# Patient Record
Sex: FEMALE | Race: WHITE | NOT HISPANIC OR LATINO | Employment: FULL TIME | URBAN - METROPOLITAN AREA
[De-identification: names, ages, dates, MRNs, and addresses within clinical notes are randomized per-mention and may not be internally consistent; named-entity substitution may affect disease eponyms.]

---

## 2018-01-23 ENCOUNTER — OFFICE VISIT (OUTPATIENT)
Dept: URGENT CARE | Facility: CLINIC | Age: 48
End: 2018-01-23
Payer: COMMERCIAL

## 2018-01-23 VITALS
HEART RATE: 82 BPM | DIASTOLIC BLOOD PRESSURE: 81 MMHG | TEMPERATURE: 98 F | HEIGHT: 65 IN | RESPIRATION RATE: 16 BRPM | SYSTOLIC BLOOD PRESSURE: 168 MMHG | OXYGEN SATURATION: 100 % | WEIGHT: 216 LBS | BODY MASS INDEX: 35.99 KG/M2

## 2018-01-23 DIAGNOSIS — J06.9 UPPER RESPIRATORY TRACT INFECTION, UNSPECIFIED TYPE: Primary | ICD-10-CM

## 2018-01-23 DIAGNOSIS — R05.9 COUGH: ICD-10-CM

## 2018-01-23 LAB
CTP QC/QA: YES
FLUAV AG NPH QL: NEGATIVE
FLUBV AG NPH QL: NEGATIVE

## 2018-01-23 PROCEDURE — 96372 THER/PROPH/DIAG INJ SC/IM: CPT | Mod: S$GLB,,, | Performed by: EMERGENCY MEDICINE

## 2018-01-23 PROCEDURE — 99203 OFFICE O/P NEW LOW 30 MIN: CPT | Mod: 25,S$GLB,, | Performed by: EMERGENCY MEDICINE

## 2018-01-23 PROCEDURE — 87804 INFLUENZA ASSAY W/OPTIC: CPT | Mod: 59,QW,S$GLB, | Performed by: EMERGENCY MEDICINE

## 2018-01-23 RX ORDER — BETAMETHASONE SODIUM PHOSPHATE AND BETAMETHASONE ACETATE 3; 3 MG/ML; MG/ML
9 INJECTION, SUSPENSION INTRA-ARTICULAR; INTRALESIONAL; INTRAMUSCULAR; SOFT TISSUE
Status: COMPLETED | OUTPATIENT
Start: 2018-01-23 | End: 2018-01-23

## 2018-01-23 RX ORDER — AZITHROMYCIN 500 MG/1
500 TABLET, FILM COATED ORAL DAILY
Qty: 5 TABLET | Refills: 0 | Status: SHIPPED | OUTPATIENT
Start: 2018-01-23 | End: 2018-01-28

## 2018-01-23 RX ADMIN — BETAMETHASONE SODIUM PHOSPHATE AND BETAMETHASONE ACETATE 9 MG: 3; 3 INJECTION, SUSPENSION INTRA-ARTICULAR; INTRALESIONAL; INTRAMUSCULAR; SOFT TISSUE at 08:01

## 2018-01-23 NOTE — PROGRESS NOTES
"Subjective:       Patient ID: Flor Burnham is a 47 y.o. female.    Vitals:  height is 5' 5" (1.651 m) and weight is 98 kg (216 lb). Her temperature is 97.6 °F (36.4 °C). Her blood pressure is 168/81 (abnormal) and her pulse is 82. Her respiration is 16 and oxygen saturation is 100%.     Chief Complaint: Sore Throat; Generalized Body Aches; Nasal Congestion; and Cough    Sore Throat    This is a new problem. The current episode started yesterday. The problem has been gradually worsening. Associated symptoms include congestion, coughing and headaches. Pertinent negatives include no abdominal pain, ear pain, hoarse voice or shortness of breath.   Cough   Associated symptoms include headaches and a sore throat. Pertinent negatives include no chest pain, chills, ear pain, eye redness, fever, myalgias, shortness of breath or wheezing.     Review of Systems   Constitution: Negative for chills, fever and malaise/fatigue.   HENT: Positive for congestion and sore throat. Negative for ear pain and hoarse voice.    Eyes: Negative for discharge and redness.   Cardiovascular: Negative for chest pain, dyspnea on exertion and leg swelling.   Respiratory: Positive for cough. Negative for shortness of breath, sputum production and wheezing.    Musculoskeletal: Negative for myalgias.   Gastrointestinal: Negative for abdominal pain and nausea.   Neurological: Positive for headaches.       Objective:      Physical Exam   Constitutional: She is oriented to person, place, and time. She appears well-developed and well-nourished. She is cooperative.  Non-toxic appearance. She does not appear ill. No distress.   HENT:   Head: Normocephalic and atraumatic.   Right Ear: Hearing, tympanic membrane, external ear and ear canal normal.   Left Ear: Hearing, tympanic membrane, external ear and ear canal normal.   Nose: Mucosal edema present. No rhinorrhea or nasal deformity. No epistaxis. Right sinus exhibits no maxillary sinus tenderness and " no frontal sinus tenderness. Left sinus exhibits no maxillary sinus tenderness and no frontal sinus tenderness.   Mouth/Throat: Uvula is midline and mucous membranes are normal. No trismus in the jaw. Normal dentition. No uvula swelling. Posterior oropharyngeal erythema present.   Eyes: Conjunctivae and lids are normal. Right eye exhibits no discharge. Left eye exhibits no discharge. No scleral icterus.   Sclera clear bilat   Neck: Trachea normal, normal range of motion, full passive range of motion without pain and phonation normal. Neck supple.   Cardiovascular: Normal rate, regular rhythm, normal heart sounds, intact distal pulses and normal pulses.    Pulmonary/Chest: Effort normal and breath sounds normal. No respiratory distress.   Abdominal: Soft. Normal appearance. She exhibits no pulsatile midline mass.   Musculoskeletal: Normal range of motion. She exhibits no edema or deformity.   Neurological: She is alert and oriented to person, place, and time. She exhibits normal muscle tone. Coordination normal.   Skin: Skin is warm, dry and intact. She is not diaphoretic. No pallor.   Psychiatric: She has a normal mood and affect. Her speech is normal and behavior is normal. Judgment and thought content normal. Cognition and memory are normal.   Nursing note and vitals reviewed.      Assessment:       1. Upper respiratory tract infection, unspecified type    2. Cough        Plan:         Upper respiratory tract infection, unspecified type  -     azithromycin (ZITHROMAX) 500 MG tablet; Take 1 tablet (500 mg total) by mouth once daily. Take 1 tablet daily for 5 days.  Dispense: 5 tablet; Refill: 0  -     betamethasone acetate-betamethasone sodium phosphate injection 9 mg; Inject 1.5 mLs (9 mg total) into the muscle one time.    Cough  -     POCT Influenza A/B      Influenza screen is negative.

## 2018-01-23 NOTE — PATIENT INSTRUCTIONS
Understanding Sinus Problems    You dont often think about your sinuses until theres a problem. One day you realize you cant smell dinner cooking. Or you find you often have headaches or problems breathing through your nose.  Symptoms of sinus problems  Sinus problems can cause uncomfortable symptoms. Your nose may run constantly. You might have trouble sleeping at night. You may even lose your sense of smell. Other symptoms can include:  · Nasal congestion  · Fullness in ears  · Green, yellow, or bloody drainage from the nose  · Trouble tasting food  · Frequent headaches  · Facial pain  · Cough  When sinuses are blocked  If something blocks the passages in the nose or sinuses, mucus cant drain. Mucus-filled sinuses often become infected.  · Colds cause the lining of the nose and sinuses to swell and make extra mucus. A buildup of mucus can lead to a more serious infection.  · Allergies irritate turbinates and other tissues. This causes swelling, which can cause a blockage. Over time, this irritation can also lead to sacs of swollen tissue (polyps).  · Polyps may form in both the sinuses and nose. Polyps can grow large enough to clog nasal passages and block drainage.  · A crooked (deviated) septum may block nasal passages. This is often the result of an injury.  Date Last Reviewed: 11/1/2016  © 7103-4794 [employer]. [address] [place] [address], [place] [address]. All rights reserved. This information is not intended as a substitute for professional medical care. Always follow your healthcare professional's instructions.

## 2018-05-19 ENCOUNTER — OFFICE VISIT (OUTPATIENT)
Dept: URGENT CARE | Facility: CLINIC | Age: 48
End: 2018-05-19
Payer: COMMERCIAL

## 2018-05-19 VITALS
SYSTOLIC BLOOD PRESSURE: 130 MMHG | TEMPERATURE: 98 F | HEART RATE: 69 BPM | BODY MASS INDEX: 35.99 KG/M2 | WEIGHT: 216 LBS | RESPIRATION RATE: 16 BRPM | OXYGEN SATURATION: 96 % | DIASTOLIC BLOOD PRESSURE: 80 MMHG | HEIGHT: 65 IN

## 2018-05-19 DIAGNOSIS — J06.9 UPPER RESPIRATORY TRACT INFECTION, UNSPECIFIED TYPE: Primary | ICD-10-CM

## 2018-05-19 PROCEDURE — 3008F BODY MASS INDEX DOCD: CPT | Mod: CPTII,S$GLB,, | Performed by: EMERGENCY MEDICINE

## 2018-05-19 PROCEDURE — 96372 THER/PROPH/DIAG INJ SC/IM: CPT | Mod: S$GLB,,, | Performed by: EMERGENCY MEDICINE

## 2018-05-19 PROCEDURE — 99213 OFFICE O/P EST LOW 20 MIN: CPT | Mod: 25,S$GLB,, | Performed by: EMERGENCY MEDICINE

## 2018-05-19 RX ORDER — BETAMETHASONE SODIUM PHOSPHATE AND BETAMETHASONE ACETATE 3; 3 MG/ML; MG/ML
12 INJECTION, SUSPENSION INTRA-ARTICULAR; INTRALESIONAL; INTRAMUSCULAR; SOFT TISSUE
Status: COMPLETED | OUTPATIENT
Start: 2018-05-19 | End: 2018-05-19

## 2018-05-19 RX ADMIN — BETAMETHASONE SODIUM PHOSPHATE AND BETAMETHASONE ACETATE 12 MG: 3; 3 INJECTION, SUSPENSION INTRA-ARTICULAR; INTRALESIONAL; INTRAMUSCULAR; SOFT TISSUE at 09:05

## 2018-05-19 NOTE — PROGRESS NOTES
"Subjective:       Patient ID: Flor Burnham is a 48 y.o. female.    Vitals:  height is 5' 5" (1.651 m) and weight is 98 kg (216 lb). Her temperature is 98 °F (36.7 °C). Her blood pressure is 130/80 and her pulse is 69. Her respiration is 16 and oxygen saturation is 96%.     Chief Complaint: Sinus Problem and Sore Throat    Sinus Problem   The current episode started in the past 7 days. The problem has been gradually worsening since onset. Associated symptoms include congestion, coughing and a sore throat. Pertinent negatives include no chills, ear pain, headaches, hoarse voice or shortness of breath.   Sore Throat    Associated symptoms include congestion and coughing. Pertinent negatives include no abdominal pain, ear pain, headaches, hoarse voice or shortness of breath.     Review of Systems   Constitution: Negative for chills, fever and malaise/fatigue.   HENT: Positive for congestion and sore throat. Negative for ear pain and hoarse voice.    Eyes: Negative for discharge and redness.   Cardiovascular: Negative for chest pain, dyspnea on exertion and leg swelling.   Respiratory: Positive for cough. Negative for shortness of breath, sputum production and wheezing.    Musculoskeletal: Negative for myalgias.   Gastrointestinal: Negative for abdominal pain and nausea.   Neurological: Negative for headaches.       Objective:      Physical Exam   Constitutional: She is oriented to person, place, and time. She appears well-developed and well-nourished. She is cooperative.  Non-toxic appearance. She does not appear ill. No distress.   HENT:   Head: Normocephalic and atraumatic.   Right Ear: Hearing, tympanic membrane, external ear and ear canal normal.   Left Ear: Hearing, tympanic membrane, external ear and ear canal normal.   Nose: Rhinorrhea present. No mucosal edema or nasal deformity. No epistaxis. Right sinus exhibits no maxillary sinus tenderness and no frontal sinus tenderness. Left sinus exhibits no " maxillary sinus tenderness and no frontal sinus tenderness.   Mouth/Throat: Uvula is midline, oropharynx is clear and moist and mucous membranes are normal. No trismus in the jaw. Normal dentition. No uvula swelling. No posterior oropharyngeal erythema.   Eyes: Conjunctivae and lids are normal. No scleral icterus.   Sclera clear bilat   Neck: Trachea normal, full passive range of motion without pain and phonation normal.   Cardiovascular: Normal rate, regular rhythm and normal pulses.    Pulmonary/Chest: Effort normal and breath sounds normal. No respiratory distress.   Abdominal: Normal appearance.   Musculoskeletal: Normal range of motion. She exhibits no edema or deformity.   Neurological: She is alert and oriented to person, place, and time. She exhibits normal muscle tone. Coordination normal.   Skin: Skin is warm, dry and intact. She is not diaphoretic. No pallor.   Psychiatric: She has a normal mood and affect. Her speech is normal and behavior is normal. Judgment and thought content normal. Cognition and memory are normal.   Nursing note and vitals reviewed.      Assessment:       1. Upper respiratory tract infection, unspecified type        Plan:         Upper respiratory tract infection, unspecified type  -     betamethasone acetate-betamethasone sodium phosphate injection 12 mg; Inject 2 mLs (12 mg total) into the muscle one time.

## 2018-05-19 NOTE — PATIENT INSTRUCTIONS
Viral Upper Respiratory Illness (Adult)  You have a viral upper respiratory illness (URI), which is another term for the common cold. This illness is contagious during the first few days. It is spread through the air by coughing and sneezing. It may also be spread by direct contact (touching the sick person and then touching your own eyes, nose, or mouth). Frequent handwashing will decrease risk of spread. Most viral illnesses go away within 7 to 10 days with rest and simple home remedies. Sometimes the illness may last for several weeks. Antibiotics will not kill a virus, and they are generally not prescribed for this condition.    Home care  · If symptoms are severe, rest at home for the first 2 to 3 days. When you resume activity, don't let yourself get too tired.  · Avoid being exposed to cigarette smoke (yours or others).  · You may use acetaminophen or ibuprofen to control pain and fever, unless another medicine was prescribed. (Note: If you have chronic liver or kidney disease, have ever had a stomach ulcer or gastrointestinal bleeding, or are taking blood-thinning medicines, talk with your healthcare provider before using these medicines.) Aspirin should never be given to anyone under 18 years of age who is ill with a viral infection or fever. It may cause severe liver or brain damage.  · Your appetite may be poor, so a light diet is fine. Avoid dehydration by drinking 6 to 8 glasses of fluids per day (water, soft drinks, juices, tea, or soup). Extra fluids will help loosen secretions in the nose and lungs.  · Over-the-counter cold medicines will not shorten the length of time youre sick, but they may be helpful for the following symptoms: cough, sore throat, and nasal and sinus congestion. (Note: Do not use decongestants if you have high blood pressure.)  Follow-up care  Follow up with your healthcare provider, or as advised.  When to seek medical advice  Call your healthcare provider right away if any  of these occur:  · Cough with lots of colored sputum (mucus)  · Severe headache; face, neck, or ear pain  · Difficulty swallowing due to throat pain  · Fever of 100.4°F (38°C)  Call 911, or get immediate medical care  Call emergency services right away if any of these occur:  · Chest pain, shortness of breath, wheezing, or difficulty breathing  · Coughing up blood  · Inability to swallow due to throat pain  Date Last Reviewed: 9/13/2015  © 1134-6319 [employer]. [address] [place] [id number]. All rights reserved. This information is not intended as a substitute for professional medical care. Always follow your healthcare professional's instructions.

## 2018-05-22 ENCOUNTER — TELEPHONE (OUTPATIENT)
Dept: URGENT CARE | Facility: CLINIC | Age: 48
End: 2018-05-22

## 2018-07-03 ENCOUNTER — OFFICE VISIT (OUTPATIENT)
Dept: URGENT CARE | Facility: CLINIC | Age: 48
End: 2018-07-03
Payer: COMMERCIAL

## 2018-07-03 VITALS
HEART RATE: 74 BPM | WEIGHT: 216 LBS | SYSTOLIC BLOOD PRESSURE: 151 MMHG | DIASTOLIC BLOOD PRESSURE: 84 MMHG | RESPIRATION RATE: 16 BRPM | OXYGEN SATURATION: 99 % | TEMPERATURE: 97 F | BODY MASS INDEX: 35.99 KG/M2 | HEIGHT: 65 IN

## 2018-07-03 DIAGNOSIS — J06.9 ACUTE URI: Primary | ICD-10-CM

## 2018-07-03 PROCEDURE — 3008F BODY MASS INDEX DOCD: CPT | Mod: CPTII,S$GLB,, | Performed by: FAMILY MEDICINE

## 2018-07-03 PROCEDURE — 99213 OFFICE O/P EST LOW 20 MIN: CPT | Mod: S$GLB,,, | Performed by: FAMILY MEDICINE

## 2018-07-03 RX ORDER — CETIRIZINE HYDROCHLORIDE 10 MG/1
10 TABLET, CHEWABLE ORAL DAILY
COMMUNITY

## 2018-07-03 RX ORDER — FLUTICASONE PROPIONATE 50 MCG
1 SPRAY, SUSPENSION (ML) NASAL DAILY
COMMUNITY
End: 2020-12-22

## 2018-07-03 RX ORDER — AZITHROMYCIN 250 MG/1
250 TABLET, FILM COATED ORAL DAILY
Qty: 6 TABLET | Refills: 0 | Status: SHIPPED | OUTPATIENT
Start: 2018-07-03 | End: 2018-07-09

## 2018-07-03 RX ORDER — CODEINE PHOSPHATE AND GUAIFENESIN 10; 100 MG/5ML; MG/5ML
5 SOLUTION ORAL NIGHTLY PRN
Qty: 50 ML | Refills: 0 | Status: SHIPPED | OUTPATIENT
Start: 2018-07-03 | End: 2018-07-13

## 2018-07-03 NOTE — PATIENT INSTRUCTIONS
Please return here or go to the Emergency Department for any concerns or worsening of condition.  If you were prescribed antibiotics, please take them to completion.  If you were prescribed a narcotic medication, do not drive or operate heavy equipment or machinery while taking these medications.  Please follow up with your primary care doctor or specialist as needed.  If you  smoke, please stop smoking.    Symptomatic treatment:    Tylenol every 4 hours  Ibuprofen ever 6 hours  salt water gargles  Cepachol helps to numb the discomfort  Chloroseptic spray  Nasal saline spray reduces inflammation and dryness  Warm face compresses as often as you can  Vicks vapor rub at night  Flonase OTC or Nasacort OTC  Simple foods like chicken noodle soup help  Delsym helps with coughing at night  Zyrtec/Claritin during the day   Rest as much as you can    Mucinex DM

## 2018-07-03 NOTE — PROGRESS NOTES
"Subjective:       Patient ID: Flor Burnham is a 48 y.o. female.    Vitals:  height is 5' 5" (1.651 m) and weight is 98 kg (216 lb). Her temperature is 97.1 °F (36.2 °C). Her blood pressure is 151/84 (abnormal) and her pulse is 74. Her respiration is 16 and oxygen saturation is 99%.     Chief Complaint: Sinus Problem    Sinus Problem   This is a new problem. The current episode started more than 1 month ago. The problem has been gradually worsening since onset. Associated symptoms include congestion and coughing. Pertinent negatives include no chills, ear pain, headaches, hoarse voice, shortness of breath or sore throat.     Review of Systems   Constitution: Negative for chills, fever and malaise/fatigue.   HENT: Positive for congestion. Negative for ear pain, hoarse voice and sore throat.    Eyes: Negative for discharge and redness.   Cardiovascular: Negative for chest pain, dyspnea on exertion and leg swelling.   Respiratory: Positive for cough and sputum production. Negative for shortness of breath and wheezing.    Musculoskeletal: Negative for myalgias.   Gastrointestinal: Negative for abdominal pain and nausea.   Neurological: Negative for headaches.       Objective:      Physical Exam   Constitutional: She appears well-developed and well-nourished.   HENT:   Head: Normocephalic and atraumatic.   Right Ear: External ear normal.   Left Ear: External ear normal.   Nose: Nose normal.   Mouth/Throat: Oropharynx is clear and moist. No oropharyngeal exudate.   Eyes: Conjunctivae are normal. Right eye exhibits no discharge. Left eye exhibits no discharge.   Cardiovascular: Normal rate, regular rhythm and normal heart sounds.    Pulmonary/Chest: Effort normal and breath sounds normal. She has no wheezes.   Skin: Skin is warm and dry.   Psychiatric: She has a normal mood and affect. Her behavior is normal.   Vitals reviewed.      Assessment:       1. Acute URI        Plan:         Acute URI    Other orders  -     " azithromycin (ZITHROMAX Z-WANDA) 250 MG tablet; Take 1 tablet (250 mg total) by mouth once daily. TWO TABS DAY ONE, ONE TAB DAYS TWO-FIVE for 6 days  Dispense: 6 tablet; Refill: 0

## 2018-07-06 ENCOUNTER — TELEPHONE (OUTPATIENT)
Dept: URGENT CARE | Facility: CLINIC | Age: 48
End: 2018-07-06

## 2019-02-18 ENCOUNTER — OFFICE VISIT (OUTPATIENT)
Dept: URGENT CARE | Facility: CLINIC | Age: 49
End: 2019-02-18
Payer: COMMERCIAL

## 2019-02-18 VITALS
SYSTOLIC BLOOD PRESSURE: 142 MMHG | DIASTOLIC BLOOD PRESSURE: 83 MMHG | WEIGHT: 216 LBS | BODY MASS INDEX: 35.99 KG/M2 | OXYGEN SATURATION: 99 % | HEIGHT: 65 IN | TEMPERATURE: 98 F | HEART RATE: 68 BPM | RESPIRATION RATE: 18 BRPM

## 2019-02-18 DIAGNOSIS — M79.672 PAIN IN LEFT FOOT: Primary | ICD-10-CM

## 2019-02-18 DIAGNOSIS — M25.561 ACUTE PAIN OF RIGHT KNEE: ICD-10-CM

## 2019-02-18 PROCEDURE — 73630 XR FOOT COMPLETE 3 VIEW LEFT: ICD-10-PCS | Mod: LT,S$GLB,, | Performed by: RADIOLOGY

## 2019-02-18 PROCEDURE — 3008F PR BODY MASS INDEX (BMI) DOCUMENTED: ICD-10-PCS | Mod: CPTII,S$GLB,, | Performed by: PHYSICIAN ASSISTANT

## 2019-02-18 PROCEDURE — 3008F BODY MASS INDEX DOCD: CPT | Mod: CPTII,S$GLB,, | Performed by: PHYSICIAN ASSISTANT

## 2019-02-18 PROCEDURE — 99214 PR OFFICE/OUTPT VISIT, EST, LEVL IV, 30-39 MIN: ICD-10-PCS | Mod: S$GLB,,, | Performed by: PHYSICIAN ASSISTANT

## 2019-02-18 PROCEDURE — 99214 OFFICE O/P EST MOD 30 MIN: CPT | Mod: S$GLB,,, | Performed by: PHYSICIAN ASSISTANT

## 2019-02-18 PROCEDURE — 73630 X-RAY EXAM OF FOOT: CPT | Mod: LT,S$GLB,, | Performed by: RADIOLOGY

## 2019-02-18 NOTE — PATIENT INSTRUCTIONS
If not allergic,take tylenol (acetominophen) for fever control, chills, or body aches every 4 hours. Do not exceed 4000 mg/ day.If not allergic, take Motrin (Ibuprofen) every 4 hours for fever, chills, pain or inflammation. Do not exceed 2400 mg/day. You can alternate taking tylenol and motrin.  If you were prescribed a narcotic medication, do not drive or operate heavy equipment or machinery while taking these medications.  You must understand that you've received an Urgent Care treatment only and that you may be released before all your medical problems are known or treated. You, the patient, will arrange for follow up care as instructed.  Follow up with your PCP or specialty clinic as directed in the next 1-2 weeks if not improved or as needed.  You can call [phone] to schedule an appointment with the appropriate provider.  If your condition worsens we recommend that you receive another evaluation at the emergency room immediately or contact your primary medical clinics after hours call service to discuss your concerns.  Please return here or go to the Emergency Department for any concerns or worsening of condition.

## 2019-02-18 NOTE — PROGRESS NOTES
"Subjective:       Patient ID: Flor Burnham is a 48 y.o. female.    Vitals:  height is 5' 5" (1.651 m) and weight is 98 kg (216 lb). Her oral temperature is 97.7 °F (36.5 °C). Her blood pressure is 142/83 (abnormal) and her pulse is 68. Her respiration is 18 and oxygen saturation is 99%.     Chief Complaint: Foot Injury    New problem left foot pain due to a fall yesterday, states slip and fell on right knee and left foot. Would like right knee to be looked at too      Foot Injury    The incident occurred 12 to 24 hours ago. The incident occurred at home. The injury mechanism was a fall. The pain is present in the left foot and right knee. The quality of the pain is described as aching and shooting. The pain is moderate. The pain has been constant since onset. Associated symptoms include an inability to bear weight, numbness and tingling. She reports no foreign bodies present. The symptoms are aggravated by movement, palpation and weight bearing. She has tried ice and elevation for the symptoms. The treatment provided no relief.       Constitution: Negative for fatigue.   HENT: Negative for facial swelling and facial trauma.    Neck: Negative for neck stiffness.   Cardiovascular: Negative for chest trauma.   Eyes: Negative for eye trauma, double vision and blurred vision.   Gastrointestinal: Negative for abdominal trauma, abdominal pain and rectal bleeding.   Genitourinary: Negative for hematuria, missed menses, genital trauma and pelvic pain.   Musculoskeletal: Negative for pain, trauma, joint swelling and abnormal ROM of joint.   Skin: Negative for color change, wound, abrasion, laceration and bruising.   Neurological: Positive for numbness. Negative for dizziness, history of vertigo, light-headedness, coordination disturbances, altered mental status and loss of consciousness.   Hematologic/Lymphatic: Negative for history of bleeding disorder.   Psychiatric/Behavioral: Negative for altered mental status.     "   Objective:      Physical Exam   Constitutional: She is oriented to person, place, and time. She appears well-developed and well-nourished. She is cooperative.  Non-toxic appearance. She does not appear ill. No distress.   HENT:   Head: Normocephalic and atraumatic. Head is without abrasion, without contusion and without laceration.   Right Ear: Hearing, tympanic membrane, external ear and ear canal normal. No hemotympanum.   Left Ear: Hearing, tympanic membrane, external ear and ear canal normal. No hemotympanum.   Nose: Nose normal. No mucosal edema, rhinorrhea or nasal deformity. No epistaxis. Right sinus exhibits no maxillary sinus tenderness and no frontal sinus tenderness. Left sinus exhibits no maxillary sinus tenderness and no frontal sinus tenderness.   Mouth/Throat: Uvula is midline, oropharynx is clear and moist and mucous membranes are normal. No trismus in the jaw. Normal dentition. No uvula swelling. No posterior oropharyngeal erythema.   Eyes: Conjunctivae, EOM and lids are normal. Pupils are equal, round, and reactive to light. Right eye exhibits no discharge. Left eye exhibits no discharge. No scleral icterus.   Sclera clear bilat   Neck: Trachea normal, normal range of motion, full passive range of motion without pain and phonation normal. Neck supple. No spinous process tenderness and no muscular tenderness present. No neck rigidity. No tracheal deviation present.   Cardiovascular: Normal rate, regular rhythm, normal heart sounds, intact distal pulses and normal pulses.   Pulmonary/Chest: Effort normal and breath sounds normal. No respiratory distress.   Abdominal: Soft. Normal appearance and bowel sounds are normal. She exhibits no distension, no pulsatile midline mass and no mass. There is no tenderness.   Musculoskeletal: Normal range of motion. She exhibits no edema or deformity.        Right knee: She exhibits swelling. She exhibits normal range of motion, no effusion, no ecchymosis, no  "deformity, no laceration, no erythema, normal alignment, no LCL laxity, normal patellar mobility, no bony tenderness, normal meniscus and no MCL laxity. Tenderness (inferior pole patella) found.        Left foot: There is tenderness, bony tenderness and swelling. There is normal range of motion.        Feet:    Neurological: She is alert and oriented to person, place, and time. She has normal strength. No cranial nerve deficit or sensory deficit. She exhibits normal muscle tone. She displays no seizure activity. Coordination normal. GCS eye subscore is 4. GCS verbal subscore is 5. GCS motor subscore is 6.   Skin: Skin is warm, dry and intact. Capillary refill takes less than 2 seconds. No abrasion, no bruising, no burn, no ecchymosis and no laceration noted. She is not diaphoretic. No pallor.   Psychiatric: She has a normal mood and affect. Her speech is normal and behavior is normal. Judgment and thought content normal. Cognition and memory are normal.   Nursing note and vitals reviewed.      Assessment:       1. Pain in left foot    2. Acute pain of right knee        Plan:         Pain in left foot  -     XR FOOT COMPLETE 3 VIEW LEFT; Future; Expected date: 02/18/2019    Acute pain of right knee    Xr Foot Complete 3 View Left    Result Date: 2/18/2019  EXAMINATION: XR FOOT COMPLETE 3 VIEW LEFT CLINICAL HISTORY: 5th MT pain;.  Pain in left foot TECHNIQUE: AP, lateral and oblique views of the left foot were performed. COMPARISON: None FINDINGS: No fracture or dislocation.  No bone destruction identified     See above Electronically signed by: Sacha Hernandez MD Date:    02/18/2019 Time:    16:26     Patient declines medications "I'm confident it will heal on its' own".     Patient Instructions      If not allergic,take tylenol (acetominophen) for fever control, chills, or body aches every 4 hours. Do not exceed 4000 mg/ day.If not allergic, take Motrin (Ibuprofen) every 4 hours for fever, chills, pain or inflammation. " Do not exceed 2400 mg/day. You can alternate taking tylenol and motrin.  If you were prescribed a narcotic medication, do not drive or operate heavy equipment or machinery while taking these medications.  You must understand that you've received an Urgent Care treatment only and that you may be released before all your medical problems are known or treated. You, the patient, will arrange for follow up care as instructed.  Follow up with your PCP or specialty clinic as directed in the next 1-2 weeks if not improved or as needed.  You can call [phone] to schedule an appointment with the appropriate provider.  If your condition worsens we recommend that you receive another evaluation at the emergency room immediately or contact your primary medical clinics after hours call service to discuss your concerns.  Please return here or go to the Emergency Department for any concerns or worsening of condition.

## 2019-08-15 ENCOUNTER — OFFICE VISIT (OUTPATIENT)
Dept: URGENT CARE | Facility: CLINIC | Age: 49
End: 2019-08-15
Payer: COMMERCIAL

## 2019-08-15 VITALS
SYSTOLIC BLOOD PRESSURE: 127 MMHG | DIASTOLIC BLOOD PRESSURE: 84 MMHG | BODY MASS INDEX: 35.99 KG/M2 | HEART RATE: 80 BPM | RESPIRATION RATE: 16 BRPM | TEMPERATURE: 97 F | OXYGEN SATURATION: 98 % | HEIGHT: 65 IN | WEIGHT: 216 LBS

## 2019-08-15 DIAGNOSIS — Z77.098 CHEMICAL EXPOSURE OF EYE: ICD-10-CM

## 2019-08-15 DIAGNOSIS — H57.89 IRRITATION OF EYE: ICD-10-CM
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
## 2019-08-15 PROCEDURE — 99204 PR OFFICE/OUTPT VISIT, NEW, LEVL IV, 45-59 MIN: ICD-10-PCS | Mod: S$GLB,,, | Performed by: PHYSICIAN ASSISTANT

## 2019-08-15 PROCEDURE — 99204 OFFICE O/P NEW MOD 45 MIN: CPT | Mod: S$GLB,,, | Performed by: PHYSICIAN ASSISTANT
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
## 2019-08-15 NOTE — LETTER
[facility] [address], [address]  [facility] [address]  Phone: [phone]  Fax: [phone]  [facility]: [phone]    Pt Name: lFor Burnham  Injury Date: 08/15/2019   Employee ID:  Date of First Treatment: 08/15/2019   Company: [employer]      Appointment Time: 10:15 AM Arrived: 1020   Provider: Gerardo Mendez PA-C Time Out:1125     Office Treatment:   1. Work related injury    2. Irritation of eye    3. Chemical exposure of eye                     Return Appointment: As Needed  May RTW without restrictions

## 2019-08-15 NOTE — PATIENT INSTRUCTIONS
Chemical Exposure: Eye     A chemical exposure, or injury, to the eye can occur when an acid or base solution comes in contact with your eye. As soon as the chemical gets in your eye, it is very important to flush (irrigate) your eye with sterile saline solution right away. If you don't have sterile saline, it is OK to use tap water instead. Chemical exposure can cause anything from mild irritation to permanent scarring and vision loss. How serious the injury is depends on what type of chemical it was, how concentrated it was, whether it was an acid or a base, and how long it was in your eye. After you flush the eye, it is important to follow up with a doctor if you have blurred vision or pain.  It is common to have some irritation for the next 24 hours, even in mild cases. If the exposure was more serious, be sure to follow up as directed.  The pressure inside of the eye can increase (glaucoma) hours to days after a chemical eye injury. This requires prompt treatment. Watch for the symptoms described below.  Home care  · You can put a cold pack (ice in a plastic bag, wrapped in a towel) over the eye for 20 minutes at a time. This will reduce pain.  · Eye drops may be prescribed to reduce irritation, inflammation, and risk of infection. If no drops were prescribed, you may use over-the-counter decongestant eye drops for irritation or redness.  · You may use acetaminophen or ibuprofen to control pain, unless another medicine was prescribed. If you have chronic liver or kidney disease or have ever had a stomach ulcer or gastrointestinal bleeding, talk with your doctor before using these medicines.  · If an eye patch was put on:  ¨ You may put the cold pack over the eye patch.  ¨ If you were given a follow-up appointment to have the patch removed and the eye examined, don't miss it. An eye patch should not be left in place for more than 48 hours, unless you are advised to do so by your healthcare provider.  ¨ Do not  drive a motor vehicle or use machinery with the eye patch in place. It is difficult to  distance with only one eye.  Follow-up care  Follow up with your healthcare provider, or as directed.  When to seek medical advice  Call your healthcare provider right away if any of these occur.  · Increased eyelid swelling  · Increasing pain in the eye  · Increasing redness or drainage from the eye  · Failure of normal vision to return within 24 to 48 hours  · Continued feeling that something is in your eye, lasting more than 48 hours  Date Last Reviewed: 6/14/2015  © 1114-1039 [employer]. [address] [place] [address]. All rights reserved. This information is not intended as a substitute for professional medical care. Always follow your healthcare professional's instructions.       If not allergic,take tylenol (acetominophen) for fever control, chills, or body aches every 4 hours. Do not exceed 4000 mg/ day.If not allergic, take Motrin (Ibuprofen) every 4 hours for fever, chills, pain or inflammation. Do not exceed 2400 mg/day. You can alternate taking tylenol and motrin.  If you were prescribed a narcotic medication, do not drive or operate heavy equipment or machinery while taking these medications.  You must understand that you've received an Urgent Care treatment only and that you may be released before all your medical problems are known or treated. You, the patient, will arrange for follow up care as instructed.  Follow up with your PCP or specialty clinic as directed in the next 1-2 weeks if not improved or as needed.  You can call [phone] to schedule an appointment with the appropriate provider.  If your condition worsens we recommend that you receive another evaluation at the emergency room immediately or contact your primary medical clinics after hours call service to discuss your concerns.  Please return here or go to the Emergency Department for any concerns or worsening of  condition.

## 2019-09-11 ENCOUNTER — OFFICE VISIT (OUTPATIENT)
Dept: URGENT CARE | Facility: CLINIC | Age: 49
End: 2019-09-11
Payer: COMMERCIAL

## 2019-09-11 VITALS
TEMPERATURE: 97 F | BODY MASS INDEX: 35.99 KG/M2 | HEIGHT: 65 IN | OXYGEN SATURATION: 99 % | HEART RATE: 88 BPM | WEIGHT: 216 LBS | DIASTOLIC BLOOD PRESSURE: 82 MMHG | SYSTOLIC BLOOD PRESSURE: 113 MMHG | RESPIRATION RATE: 16 BRPM

## 2019-09-11 DIAGNOSIS — R30.0 DYSURIA: ICD-10-CM

## 2019-09-11 DIAGNOSIS — N39.0 URINARY TRACT INFECTION WITHOUT HEMATURIA, SITE UNSPECIFIED: Primary | ICD-10-CM

## 2019-09-11 LAB
BILIRUB UR QL STRIP: NEGATIVE
GLUCOSE UR QL STRIP: NEGATIVE
KETONES UR QL STRIP: NEGATIVE
LEUKOCYTE ESTERASE UR QL STRIP: POSITIVE
PH, POC UA: 5.5 (ref 5–8)
POC BLOOD, URINE: NEGATIVE
POC NITRATES, URINE: NEGATIVE
PROT UR QL STRIP: NEGATIVE
SP GR UR STRIP: 1.01 (ref 1–1.03)
UROBILINOGEN UR STRIP-ACNC: NORMAL (ref 0.1–1.1)

## 2019-09-11 PROCEDURE — 81003 URINALYSIS AUTO W/O SCOPE: CPT | Mod: QW,S$GLB,, | Performed by: INTERNAL MEDICINE

## 2019-09-11 PROCEDURE — 99214 PR OFFICE/OUTPT VISIT, EST, LEVL IV, 30-39 MIN: ICD-10-PCS | Mod: S$GLB,,, | Performed by: INTERNAL MEDICINE

## 2019-09-11 PROCEDURE — 3008F PR BODY MASS INDEX (BMI) DOCUMENTED: ICD-10-PCS | Mod: CPTII,S$GLB,, | Performed by: INTERNAL MEDICINE

## 2019-09-11 PROCEDURE — 99214 OFFICE O/P EST MOD 30 MIN: CPT | Mod: S$GLB,,, | Performed by: INTERNAL MEDICINE

## 2019-09-11 PROCEDURE — 3008F BODY MASS INDEX DOCD: CPT | Mod: CPTII,S$GLB,, | Performed by: INTERNAL MEDICINE

## 2019-09-11 PROCEDURE — 81003 POCT URINALYSIS, DIPSTICK, AUTOMATED, W/O SCOPE: ICD-10-PCS | Mod: QW,S$GLB,, | Performed by: INTERNAL MEDICINE

## 2019-09-11 RX ORDER — NITROFURANTOIN 25; 75 MG/1; MG/1
100 CAPSULE ORAL 2 TIMES DAILY
Qty: 14 CAPSULE | Refills: 0 | Status: SHIPPED | OUTPATIENT
Start: 2019-09-11 | End: 2019-09-16

## 2019-09-11 NOTE — PROGRESS NOTES
"Subjective:       Patient ID: Flor Burnham is a 49 y.o. female.    Vitals:  height is 5' 5" (1.651 m) and weight is 98 kg (216 lb). Her temperature is 97.3 °F (36.3 °C). Her blood pressure is 113/82 and her pulse is 88. Her respiration is 16 and oxygen saturation is 99%.     Chief Complaint: Urinary Tract Infection    Urinary Tract Infection    This is a new problem. The current episode started in the past 7 days. The problem has been unchanged. The quality of the pain is described as burning. The pain is at a severity of 2/10. The patient is experiencing no pain. There has been no fever. Pertinent negatives include no chills, hematuria, nausea, vomiting or rash. She has tried nothing for the symptoms. The treatment provided no relief.       Constitution: Negative for chills and fever.   Neck: Negative for painful lymph nodes.   Gastrointestinal: Negative for abdominal pain, nausea and vomiting.   Genitourinary: Positive for dysuria. Negative for urine decreased, hematuria, history of kidney stones, painful menstruation, irregular menstruation, missed menses, heavy menstrual bleeding, ovarian cysts, genital trauma, vaginal pain, vaginal discharge, vaginal bleeding, vaginal odor, painful intercourse, genital sore, painful ejaculation and pelvic pain.   Musculoskeletal: Negative for back pain.   Skin: Negative for rash and lesion.   Hematologic/Lymphatic: Negative for swollen lymph nodes.       Objective:      Physical Exam   Constitutional: She is oriented to person, place, and time. She appears well-developed and well-nourished. She is cooperative.  Non-toxic appearance. She does not appear ill. No distress.   HENT:   Head: Normocephalic and atraumatic.   Right Ear: Hearing, tympanic membrane, external ear and ear canal normal.   Left Ear: Hearing, tympanic membrane, external ear and ear canal normal.   Nose: Nose normal. No mucosal edema, rhinorrhea or nasal deformity. No epistaxis. Right sinus exhibits no " maxillary sinus tenderness and no frontal sinus tenderness. Left sinus exhibits no maxillary sinus tenderness and no frontal sinus tenderness.   Mouth/Throat: Uvula is midline, oropharynx is clear and moist and mucous membranes are normal. No trismus in the jaw. Normal dentition. No uvula swelling. No posterior oropharyngeal erythema.   Eyes: Conjunctivae and lids are normal. Right eye exhibits no discharge. Left eye exhibits no discharge. No scleral icterus.   Sclera clear bilat   Neck: Trachea normal, normal range of motion, full passive range of motion without pain and phonation normal. Neck supple.   Cardiovascular: Normal rate, regular rhythm, normal heart sounds, intact distal pulses and normal pulses.   Pulmonary/Chest: Effort normal and breath sounds normal. No respiratory distress.   Abdominal: Soft. Normal appearance and bowel sounds are normal. She exhibits no distension, no pulsatile midline mass and no mass. There is no tenderness.   Musculoskeletal: Normal range of motion. She exhibits no edema or deformity.   Neurological: She is alert and oriented to person, place, and time. She exhibits normal muscle tone. Coordination normal.   Skin: Skin is warm, dry and intact. She is not diaphoretic. No pallor.   Psychiatric: She has a normal mood and affect. Her speech is normal and behavior is normal. Judgment and thought content normal. Cognition and memory are normal.   Nursing note and vitals reviewed.      Assessment:       1. Urinary tract infection without hematuria, site unspecified    2. Dysuria        Plan:         Urinary tract infection without hematuria, site unspecified  -     nitrofurantoin, macrocrystal-monohydrate, (MACROBID) 100 MG capsule; Take 1 capsule (100 mg total) by mouth 2 (two) times daily. for 7 days  Dispense: 14 capsule; Refill: 0    Dysuria  -     POCT Urinalysis, Dipstick, Automated, W/O Scope      Patient Instructions     Bladder Infection, Female (Adult)    Urine is normally  "doesn't have any bacteria in it. But bacteria can get into the urinary tract from the skin around the rectum. Or they can travel in the blood from elsewhere in the body. Once they are in your urinary tract, they can cause infection in the urethra (urethritis), the bladder (cystitis), or the kidneys (pyelonephritis).  The most common place for an infection is in the bladder. This is called a bladder infection. This is one of the most common infections in women. Most bladder infections are easily treated. They are not serious unless the infection spreads to the kidney.  The phrases "bladder infection," "UTI," and "cystitis" are often used to describe the same thing. But they are not always the same. Cystitis is an inflammation of the bladder. The most common cause of cystitis is an infection.  Symptoms  The infection causes inflammation in the urethra and bladder. This causes many of the symptoms. The most common symptoms of a bladder infection are:  · Pain or burning when urinating  · Having to urinate more often than usual  · Urgent need to urinate  · Only a small amount of urine comes out  · Blood in urine  · Abdominal discomfort. This is usually in the lower abdomen above the pubic bone.  · Cloudy urine  · Strong- or bad-smelling urine  · Unable to urinate (urinary retention)  · Unable to hold urine in (urinary incontinence)  · Fever  · Loss of appetite  · Confusion (in older adults)  Causes  Bladder infections are not contagious. You can't get one from someone else, from a toilet seat, or from sharing a bath.  The most common cause of bladder infections is bacteria from the bowels. The bacteria get onto the skin around the opening of the urethra. From there, they can get into the urine and travel up to the bladder, causing inflammation and infection. This usually happens because of:  · Wiping improperly after urinating. Always wipe from front to back.  · Bowel incontinence  · Pregnancy  · Procedures such as " having a catheter inserted  · Older age  · Not emptying your bladder. This can allow bacteria a chance to grow in your urine.  · Dehydration  · Constipation  · Sex  · Use of a diaphragm for birth control   Treatment  Bladder infections are diagnosed by a urine test. They are treated with antibiotics and usually clear up quickly without complications. Treatment helps prevent a more serious kidney infection.  Medicines  Medicines can help in the treatment of a bladder infection:  · Take antibiotics until they are used up, even if you feel better. It is important to finish them to make sure the infection has cleared.  · You can use acetaminophen or ibuprofen for pain, fever, or discomfort, unless another medicine was prescribed. If you have chronic liver or kidney disease, talk with your healthcare provider before using these medicines. Also talk with your provider if you've ever had a stomach ulcer or gastrointestinal bleeding, or are taking blood-thinner medicines.  · If you are given phenazopydridine to reduce burning with urination, it will cause your urine to become a bright orange color. This can stain clothing.  Care and prevention  These self-care steps can help prevent future infections:  · Drink plenty of fluids to prevent dehydration and flush out your bladder. Do this unless you must restrict fluids for other health reasons, or your doctor told you not to.  · Proper cleaning after going to the bathroom is important. Wipe from front to back after using the toilet to prevent the spread of bacteria.  · Urinate more often. Don't try to hold urine in for a long time.  · Wear loose-fitting clothes and cotton underwear. Avoid tight-fitting pants.  · Improve your diet and prevent constipation. Eat more fresh fruit and vegetables, and fiber, and less junk and fatty foods.  · Avoid sex until your symptoms are gone.  · Avoid caffeine, alcohol, and spicy foods. These can irritate your bladder.  · Urinate right after  intercourse to flush out your bladder.  · If you use birth control pills and have frequent bladder infections, discuss it with your doctor.  Follow-up care  Call your healthcare provider if all symptoms are not gone after 3 days of treatment. This is especially important if you have repeat infections.  If a culture was done, you will be told if your treatment needs to be changed. If directed, you can call to find out the results.  If X-rays were done, you will be told if the results will affect your treatment.  Call 911  Call 911 if any of the following occur:  · Trouble breathing  · Hard to wake up or confusion  · Fainting or loss of consciousness  · Rapid heart rate  When to seek medical advice  Call your healthcare provider right away if any of these occur:  · Fever of 100.4ºF (38.0ºC) or higher, or as directed by your healthcare provider  · Symptoms are not better by the third day of treatment  · Back or belly (abdominal) pain that gets worse  · Repeated vomiting, or unable to keep medicine down  · Weakness or dizziness  · Vaginal discharge  · Pain, redness, or swelling in the outer vaginal area (labia)  Date Last Reviewed: 10/1/2016  © 1255-9875 [employer]. [address] [place] [address], [place] [address]. All rights reserved. This information is not intended as a substitute for professional medical care. Always follow your healthcare professional's instructions.           My notes were dictated with [employer]. Any misspellings or nonsensical grammar should be attributed to its use and allowances made for errors and typographic syntactical error(s).

## 2019-09-11 NOTE — PATIENT INSTRUCTIONS

## 2019-09-16 ENCOUNTER — OFFICE VISIT (OUTPATIENT)
Dept: URGENT CARE | Facility: CLINIC | Age: 49
End: 2019-09-16
Payer: COMMERCIAL

## 2019-09-16 VITALS
TEMPERATURE: 98 F | HEART RATE: 75 BPM | HEIGHT: 65 IN | DIASTOLIC BLOOD PRESSURE: 79 MMHG | WEIGHT: 216 LBS | SYSTOLIC BLOOD PRESSURE: 138 MMHG | OXYGEN SATURATION: 98 % | BODY MASS INDEX: 35.99 KG/M2

## 2019-09-16 DIAGNOSIS — T50.905A ADVERSE REACTION TO DRUG IN THERAPEUTIC USE: ICD-10-CM

## 2019-09-16 DIAGNOSIS — R30.0 DYSURIA: Primary | ICD-10-CM

## 2019-09-16 LAB
BILIRUB UR QL STRIP: NEGATIVE
GLUCOSE UR QL STRIP: NEGATIVE
KETONES UR QL STRIP: NEGATIVE
LEUKOCYTE ESTERASE UR QL STRIP: POSITIVE
PH, POC UA: 5 (ref 5–8)
POC BLOOD, URINE: POSITIVE
POC NITRATES, URINE: NEGATIVE
PROT UR QL STRIP: NEGATIVE
SP GR UR STRIP: 1.02 (ref 1–1.03)
UROBILINOGEN UR STRIP-ACNC: NORMAL (ref 0.1–1.1)

## 2019-09-16 PROCEDURE — 3008F BODY MASS INDEX DOCD: CPT | Mod: CPTII,S$GLB,, | Performed by: FAMILY MEDICINE

## 2019-09-16 PROCEDURE — 81003 URINALYSIS AUTO W/O SCOPE: CPT | Mod: QW,S$GLB,, | Performed by: FAMILY MEDICINE

## 2019-09-16 PROCEDURE — 81003 POCT URINALYSIS, DIPSTICK, AUTOMATED, W/O SCOPE: ICD-10-PCS | Mod: QW,S$GLB,, | Performed by: FAMILY MEDICINE

## 2019-09-16 PROCEDURE — 99214 PR OFFICE/OUTPT VISIT, EST, LEVL IV, 30-39 MIN: ICD-10-PCS | Mod: S$GLB,,, | Performed by: FAMILY MEDICINE

## 2019-09-16 PROCEDURE — 3008F PR BODY MASS INDEX (BMI) DOCUMENTED: ICD-10-PCS | Mod: CPTII,S$GLB,, | Performed by: FAMILY MEDICINE

## 2019-09-16 PROCEDURE — 99214 OFFICE O/P EST MOD 30 MIN: CPT | Mod: S$GLB,,, | Performed by: FAMILY MEDICINE

## 2019-09-16 RX ORDER — SULFAMETHOXAZOLE AND TRIMETHOPRIM 800; 160 MG/1; MG/1
1 TABLET ORAL 2 TIMES DAILY
Qty: 20 TABLET | Refills: 0 | Status: SHIPPED | OUTPATIENT
Start: 2019-09-16 | End: 2019-09-26

## 2019-09-16 NOTE — PROGRESS NOTES
"Subjective:       Patient ID: Flor Burnham is a 49 y.o. female.    Vitals:  height is 5' 5" (1.651 m) and weight is 98 kg (216 lb). Her oral temperature is 97.6 °F (36.4 °C). Her blood pressure is 138/79 and her pulse is 75. Her oxygen saturation is 98%.     Chief Complaint: Dysuria    Pt states that she was seen a few days ago and was given antibiotics for a UTI.  Pt stated that the Macrobid gave her diarrhea so she stopped taking it 1 day ago.  Pt is still having "discomfort and burning".     Dysuria    This is a recurrent problem. The current episode started in the past 7 days. The problem occurs intermittently. The problem has been gradually worsening. The quality of the pain is described as burning. Pertinent negatives include no chills, frequency, hematuria, nausea, urgency, vomiting or rash. She has tried antibiotics for the symptoms. The treatment provided mild relief. Her past medical history is significant for recurrent UTIs.       Constitution: Negative for chills and fever.   Neck: Negative for painful lymph nodes.   Gastrointestinal: Negative for abdominal pain, nausea and vomiting.   Genitourinary: Positive for dysuria and pelvic pain ("discomfort"). Negative for frequency, urgency, urine decreased, hematuria, history of kidney stones, painful menstruation, irregular menstruation, missed menses, heavy menstrual bleeding, ovarian cysts, genital trauma, vaginal pain, vaginal discharge, vaginal bleeding, vaginal odor, painful intercourse, genital sore and painful ejaculation.   Musculoskeletal: Negative for back pain.   Skin: Negative for rash and lesion.   Hematologic/Lymphatic: Negative for swollen lymph nodes.       Objective:      Physical Exam   Constitutional: She is oriented to person, place, and time. She appears well-developed and well-nourished. She is cooperative.  Non-toxic appearance. She does not appear ill. No distress.   HENT:   Head: Normocephalic and atraumatic.   Right Ear: " Hearing, tympanic membrane, external ear and ear canal normal.   Left Ear: Hearing, tympanic membrane, external ear and ear canal normal.   Nose: Nose normal. No mucosal edema, rhinorrhea or nasal deformity. No epistaxis. Right sinus exhibits no maxillary sinus tenderness and no frontal sinus tenderness. Left sinus exhibits no maxillary sinus tenderness and no frontal sinus tenderness.   Mouth/Throat: Uvula is midline, oropharynx is clear and moist and mucous membranes are normal. No trismus in the jaw. Normal dentition. No uvula swelling. No posterior oropharyngeal erythema.   Eyes: Conjunctivae and lids are normal. Right eye exhibits no discharge. Left eye exhibits no discharge. No scleral icterus.   Sclera clear bilat   Neck: Trachea normal, normal range of motion, full passive range of motion without pain and phonation normal. Neck supple.   Cardiovascular: Normal rate, regular rhythm, normal heart sounds, intact distal pulses and normal pulses.   Pulmonary/Chest: Effort normal and breath sounds normal. No respiratory distress.   Abdominal: Soft. Normal appearance and bowel sounds are normal. She exhibits no distension, no pulsatile midline mass and no mass. There is no tenderness.   Musculoskeletal: Normal range of motion. She exhibits no edema or deformity.   Neurological: She is alert and oriented to person, place, and time. She exhibits normal muscle tone. Coordination normal.   Skin: Skin is warm, dry and intact. She is not diaphoretic. No pallor.   Psychiatric: She has a normal mood and affect. Her speech is normal and behavior is normal. Judgment and thought content normal. Cognition and memory are normal.   Nursing note and vitals reviewed.      Assessment:       1. Dysuria    2. Adverse reaction to drug in therapeutic use        Plan:         Dysuria  -     POCT Urinalysis, Dipstick, Automated, W/O Scope  -     Culture, Urine    Adverse reaction to drug in therapeutic use    Other orders  -      sulfamethoxazole-trimethoprim 800-160mg (BACTRIM DS) 800-160 mg Tab; Take 1 tablet by mouth 2 (two) times daily. for 10 days  Dispense: 20 tablet; Refill: 0

## 2019-09-19 ENCOUNTER — TELEPHONE (OUTPATIENT)
Dept: URGENT CARE | Facility: CLINIC | Age: 49
End: 2019-09-19

## 2019-09-19 LAB
BACTERIA UR CULT: NO GROWTH
BACTERIA UR CULT: NORMAL

## 2019-09-20 ENCOUNTER — TELEPHONE (OUTPATIENT)
Dept: URGENT CARE | Facility: CLINIC | Age: 49
End: 2019-09-20

## 2019-09-21 ENCOUNTER — TELEPHONE (OUTPATIENT)
Dept: URGENT CARE | Facility: CLINIC | Age: 49
End: 2019-09-21

## 2019-09-21 NOTE — TELEPHONE ENCOUNTER
Left Message  ----- Message from Ashlee Meeks NP sent at 9/19/2019  6:54 PM CDT -----  Please let the patient know there is no current signs of infection (UTI). Have her finish out the bactrim antibiotic. If still having symptoms, recommend she f/u with primary care or rturn to urgent care for reevaluation.

## 2019-09-22 ENCOUNTER — TELEPHONE (OUTPATIENT)
Dept: URGENT CARE | Facility: CLINIC | Age: 49
End: 2019-09-22

## 2019-09-22 NOTE — TELEPHONE ENCOUNTER
Gave Results    ----- Message from Ashlee Meeks NP sent at 9/19/2019  6:54 PM CDT -----  Please let the patient know there is no current signs of infection (UTI). Have her finish out the bactrim antibiotic. If still having symptoms, recommend she f/u with primary care or rturn to urgent care for reevaluation.

## 2020-03-05 ENCOUNTER — OFFICE VISIT (OUTPATIENT)
Dept: URGENT CARE | Facility: CLINIC | Age: 50
End: 2020-03-05
Payer: COMMERCIAL

## 2020-03-05 VITALS
HEART RATE: 69 BPM | DIASTOLIC BLOOD PRESSURE: 83 MMHG | HEIGHT: 66 IN | SYSTOLIC BLOOD PRESSURE: 126 MMHG | OXYGEN SATURATION: 100 % | BODY MASS INDEX: 35.36 KG/M2 | WEIGHT: 220 LBS | RESPIRATION RATE: 16 BRPM | TEMPERATURE: 98 F

## 2020-03-05 DIAGNOSIS — Z20.818 EXPOSURE TO STREP THROAT: Primary | ICD-10-CM

## 2020-03-05 DIAGNOSIS — J02.9 VIRAL PHARYNGITIS: ICD-10-CM

## 2020-03-05 LAB
CTP QC/QA: YES
MOLECULAR STREP A: NEGATIVE

## 2020-03-05 PROCEDURE — 99213 OFFICE O/P EST LOW 20 MIN: CPT | Mod: S$GLB,,, | Performed by: NURSE PRACTITIONER

## 2020-03-05 PROCEDURE — 99213 PR OFFICE/OUTPT VISIT, EST, LEVL III, 20-29 MIN: ICD-10-PCS | Mod: S$GLB,,, | Performed by: NURSE PRACTITIONER

## 2020-03-05 PROCEDURE — 87651 POCT STREP A MOLECULAR: ICD-10-PCS | Mod: QW,S$GLB,, | Performed by: NURSE PRACTITIONER

## 2020-03-05 PROCEDURE — 87651 STREP A DNA AMP PROBE: CPT | Mod: QW,S$GLB,, | Performed by: NURSE PRACTITIONER

## 2020-03-05 RX ORDER — HYDROCHLOROTHIAZIDE 12.5 MG/1
12.5 CAPSULE ORAL DAILY
COMMUNITY
End: 2021-05-21 | Stop reason: SDUPTHER

## 2020-03-05 RX ORDER — ROSUVASTATIN CALCIUM 10 MG/1
10 TABLET, COATED ORAL DAILY
COMMUNITY
End: 2021-06-28

## 2020-03-05 NOTE — PATIENT INSTRUCTIONS
Viral Pharyngitis (Sore Throat)    You (or your child, if your child is the patient) have pharyngitis (sore throat). This infection is caused by a virus. It can cause throat pain that is worse when swallowing, aching all over, headache, and fever. The infection may be spread by coughing, kissing, or touching others after touching your mouth or nose. Antibiotic medications do not work against viruses, so they are not used for treating this condition.  Home care  · If your symptoms are severe, rest at home. Return to work or school when you feel well enough.   · Drink plenty of fluids to avoid dehydration.  · For children: Use acetaminophen for fever, fussiness or discomfort. In infants over six months of age, you may use ibuprofen instead of acetaminophen. (NOTE: If your child has chronic liver or kidney disease or ever had a stomach ulcer or GI bleeding, talk with your doctor before using these medicines.) (NOTE: Aspirin should never be used in anyone under 18 years of age who is ill with a fever. It may cause severe liver damage.)   · For adults: You may use acetaminophen or ibuprofen to control pain or fever, unless another medicine was prescribed for this. (NOTE: If you have chronic liver or kidney disease or ever had a stomach ulcer or GI bleeding, talk with your doctor before using these medicines.)  · Throat lozenges or numbing throat sprays can help reduce pain. Gargling with warm salt water will also help reduce throat pain. For this, dissolve 1/2 teaspoon of salt in 1 glass of warm water. To help soothe a sore throat, children can sip on juice or a popsicle. Children 5 years and older can also suck on a lollipop or hard candy.  · Avoid salty or spicy foods, which can be irritating to the throat.  Follow-up care  Follow up with your healthcare provider or our staff if you are not improving over the next week.  When to seek medical advice  Call your healthcare provider right away if any of these  occur:  · Fever as directed by your doctor.  For children, seek care if:  ¨ Your child is of any age and has repeated fevers above 104°F (40°C).  ¨ Your child is younger than 2 years of age and has a fever of 100.4°F (38°C) that continues for more than 1 day.  ¨ Your child is 2 years old or older and has a fever of 100.4°F (38°C) that continues for more than 3 days.  · New or worsening ear pain, sinus pain, or headache  · Painful lumps in the back of neck  · Stiff neck  · Lymph nodes are getting larger  · Inability to swallow liquids, excessive drooling, or inability to open mouth wide due to throat pain  · Signs of dehydration (very dark urine or no urine, sunken eyes, dizziness)  · Trouble breathing or noisy breathing  · Muffled voice  · New rash  · Child appears to be getting sicker  Date Last Reviewed: 4/13/2015  © 0973-9589 [employer], [employer]. [address] [place] [address], [place] [address]. All rights reserved. This information is not intended as a substitute for professional medical care. Always follow your healthcare professional's instructions.

## 2020-03-05 NOTE — PROGRESS NOTES
"Subjective:       Patient ID: Flor Burnham is a 49 y.o. female.    Vitals:  height is 5' 6" (1.676 m) and weight is 99.8 kg (220 lb). Her oral temperature is 98 °F (36.7 °C). Her blood pressure is 126/83 and her pulse is 69. Her respiration is 16 and oxygen saturation is 100%.     Chief Complaint: Sore Throat    Pt presents to urgent care with a sore throat.  She states that symptoms started yesterday.     Sore Throat    This is a new problem. The current episode started yesterday. The problem has been unchanged. Pertinent negatives include no congestion, coughing, diarrhea, headaches, shortness of breath or vomiting. She has tried nothing for the symptoms. The treatment provided no relief.       Constitution: Negative for chills, fatigue and fever.   HENT: Positive for sore throat. Negative for congestion.    Neck: Negative for painful lymph nodes.   Cardiovascular: Negative for chest pain and leg swelling.   Eyes: Negative for double vision and blurred vision.   Respiratory: Negative for cough and shortness of breath.    Gastrointestinal: Negative for nausea, vomiting and diarrhea.   Genitourinary: Negative for dysuria, frequency, urgency and history of kidney stones.   Musculoskeletal: Negative for joint pain, joint swelling, muscle cramps and muscle ache.   Skin: Negative for color change, pale, rash and bruising.   Allergic/Immunologic: Negative for seasonal allergies.   Neurological: Negative for dizziness, history of vertigo, light-headedness, passing out and headaches.   Hematologic/Lymphatic: Negative for swollen lymph nodes.   Psychiatric/Behavioral: Negative for nervous/anxious, sleep disturbance and depression. The patient is not nervous/anxious.        Objective:      Physical Exam   Constitutional: She is oriented to person, place, and time. She appears well-developed and well-nourished. She is cooperative.  Non-toxic appearance. She does not have a sickly appearance. She does not appear ill. No " distress.   HENT:   Head: Normocephalic and atraumatic.   Right Ear: Hearing, external ear and ear canal normal. A middle ear effusion is present.   Left Ear: Hearing, external ear and ear canal normal. A middle ear effusion is present.   Nose: No mucosal edema, rhinorrhea or nasal deformity. No epistaxis. Right sinus exhibits no maxillary sinus tenderness and no frontal sinus tenderness. Left sinus exhibits no maxillary sinus tenderness and no frontal sinus tenderness.   Mouth/Throat: Uvula is midline and mucous membranes are normal. No trismus in the jaw. Normal dentition. No uvula swelling. Posterior oropharyngeal erythema present. No oropharyngeal exudate or posterior oropharyngeal edema.   Eyes: Conjunctivae and lids are normal. No scleral icterus.   Neck: Trachea normal, full passive range of motion without pain and phonation normal. Neck supple. No neck rigidity. No edema and no erythema present.   Cardiovascular: Normal rate, regular rhythm, normal heart sounds, intact distal pulses and normal pulses.   Pulmonary/Chest: Effort normal and breath sounds normal. No respiratory distress. She has no decreased breath sounds. She has no rhonchi.   Abdominal: Normal appearance.   Musculoskeletal: Normal range of motion. She exhibits no edema or deformity.   Neurological: She is alert and oriented to person, place, and time. She exhibits normal muscle tone. Coordination normal.   Skin: Skin is warm, dry, intact, not diaphoretic and not pale.   Psychiatric: She has a normal mood and affect. Her speech is normal and behavior is normal. Judgment and thought content normal. Cognition and memory are normal.   Nursing note and vitals reviewed.        Assessment:       1. Exposure to strep throat    2. Viral pharyngitis        Plan:         Exposure to strep throat  -     POCT Strep A, Molecular    Viral pharyngitis      Patient Instructions     Viral Pharyngitis (Sore Throat)    You (or your child, if your child is the  patient) have pharyngitis (sore throat). This infection is caused by a virus. It can cause throat pain that is worse when swallowing, aching all over, headache, and fever. The infection may be spread by coughing, kissing, or touching others after touching your mouth or nose. Antibiotic medications do not work against viruses, so they are not used for treating this condition.  Home care  · If your symptoms are severe, rest at home. Return to work or school when you feel well enough.   · Drink plenty of fluids to avoid dehydration.  · For children: Use acetaminophen for fever, fussiness or discomfort. In infants over six months of age, you may use ibuprofen instead of acetaminophen. (NOTE: If your child has chronic liver or kidney disease or ever had a stomach ulcer or GI bleeding, talk with your doctor before using these medicines.) (NOTE: Aspirin should never be used in anyone under 18 years of age who is ill with a fever. It may cause severe liver damage.)   · For adults: You may use acetaminophen or ibuprofen to control pain or fever, unless another medicine was prescribed for this. (NOTE: If you have chronic liver or kidney disease or ever had a stomach ulcer or GI bleeding, talk with your doctor before using these medicines.)  · Throat lozenges or numbing throat sprays can help reduce pain. Gargling with warm salt water will also help reduce throat pain. For this, dissolve 1/2 teaspoon of salt in 1 glass of warm water. To help soothe a sore throat, children can sip on juice or a popsicle. Children 5 years and older can also suck on a lollipop or hard candy.  · Avoid salty or spicy foods, which can be irritating to the throat.  Follow-up care  Follow up with your healthcare provider or our staff if you are not improving over the next week.  When to seek medical advice  Call your healthcare provider right away if any of these occur:  · Fever as directed by your doctor.  For children, seek care if:  ¨ Your child is  of any age and has repeated fevers above 104°F (40°C).  ¨ Your child is younger than 2 years of age and has a fever of 100.4°F (38°C) that continues for more than 1 day.  ¨ Your child is 2 years old or older and has a fever of 100.4°F (38°C) that continues for more than 3 days.  · New or worsening ear pain, sinus pain, or headache  · Painful lumps in the back of neck  · Stiff neck  · Lymph nodes are getting larger  · Inability to swallow liquids, excessive drooling, or inability to open mouth wide due to throat pain  · Signs of dehydration (very dark urine or no urine, sunken eyes, dizziness)  · Trouble breathing or noisy breathing  · Muffled voice  · New rash  · Child appears to be getting sicker  Date Last Reviewed: 4/13/2015  © 2329-5977 [employer]. [address] [place] [address], [place] [address]. All rights reserved. This information is not intended as a substitute for professional medical care. Always follow your healthcare professional's instructions.

## 2020-12-22 ENCOUNTER — OFFICE VISIT (OUTPATIENT)
Dept: URGENT CARE | Facility: CLINIC | Age: 50
End: 2020-12-22
Payer: COMMERCIAL

## 2020-12-22 VITALS
HEIGHT: 66 IN | BODY MASS INDEX: 33.11 KG/M2 | OXYGEN SATURATION: 100 % | WEIGHT: 206 LBS | DIASTOLIC BLOOD PRESSURE: 73 MMHG | SYSTOLIC BLOOD PRESSURE: 108 MMHG | TEMPERATURE: 98 F | HEART RATE: 86 BPM | RESPIRATION RATE: 18 BRPM

## 2020-12-22 DIAGNOSIS — R09.89 SINUS COMPLAINT: Primary | ICD-10-CM

## 2020-12-22 DIAGNOSIS — J06.9 VIRAL URI: ICD-10-CM

## 2020-12-22 DIAGNOSIS — R09.81 SINUS CONGESTION: ICD-10-CM

## 2020-12-22 LAB
CTP QC/QA: YES
SARS-COV-2 RDRP RESP QL NAA+PROBE: NEGATIVE

## 2020-12-22 PROCEDURE — 96372 PR INJECTION,THERAP/PROPH/DIAG2ST, IM OR SUBCUT: ICD-10-PCS | Mod: S$GLB,,, | Performed by: PHYSICIAN ASSISTANT

## 2020-12-22 PROCEDURE — 99214 OFFICE O/P EST MOD 30 MIN: CPT | Mod: 25,S$GLB,, | Performed by: PHYSICIAN ASSISTANT

## 2020-12-22 PROCEDURE — U0002: ICD-10-PCS | Mod: QW,S$GLB,, | Performed by: PHYSICIAN ASSISTANT

## 2020-12-22 PROCEDURE — 3008F BODY MASS INDEX DOCD: CPT | Mod: CPTII,S$GLB,, | Performed by: PHYSICIAN ASSISTANT

## 2020-12-22 PROCEDURE — U0002 COVID-19 LAB TEST NON-CDC: HCPCS | Mod: QW,S$GLB,, | Performed by: PHYSICIAN ASSISTANT

## 2020-12-22 PROCEDURE — 96372 THER/PROPH/DIAG INJ SC/IM: CPT | Mod: S$GLB,,, | Performed by: PHYSICIAN ASSISTANT

## 2020-12-22 PROCEDURE — 99214 PR OFFICE/OUTPT VISIT, EST, LEVL IV, 30-39 MIN: ICD-10-PCS | Mod: 25,S$GLB,, | Performed by: PHYSICIAN ASSISTANT

## 2020-12-22 PROCEDURE — 3008F PR BODY MASS INDEX (BMI) DOCUMENTED: ICD-10-PCS | Mod: CPTII,S$GLB,, | Performed by: PHYSICIAN ASSISTANT

## 2020-12-22 RX ORDER — FLUTICASONE PROPIONATE 50 MCG
1 SPRAY, SUSPENSION (ML) NASAL 2 TIMES DAILY PRN
Qty: 15.8 ML | Refills: 1 | Status: SHIPPED | OUTPATIENT
Start: 2020-12-22 | End: 2021-12-16 | Stop reason: SDUPTHER

## 2020-12-22 RX ORDER — PHENTERMINE AND TOPIRAMATE 7.5; 46 MG/1; MG/1
CAPSULE, EXTENDED RELEASE ORAL
COMMUNITY
End: 2021-06-08 | Stop reason: SDUPTHER

## 2020-12-22 RX ORDER — BETAMETHASONE SODIUM PHOSPHATE AND BETAMETHASONE ACETATE 3; 3 MG/ML; MG/ML
6 INJECTION, SUSPENSION INTRA-ARTICULAR; INTRALESIONAL; INTRAMUSCULAR; SOFT TISSUE
Status: COMPLETED | OUTPATIENT
Start: 2020-12-22 | End: 2020-12-22

## 2020-12-22 RX ADMIN — BETAMETHASONE SODIUM PHOSPHATE AND BETAMETHASONE ACETATE 6 MG: 3; 3 INJECTION, SUSPENSION INTRA-ARTICULAR; INTRALESIONAL; INTRAMUSCULAR; SOFT TISSUE at 05:12

## 2020-12-22 NOTE — PATIENT INSTRUCTIONS
Sinusitis (No Antibiotics)    The sinuses are air-filled spaces within the bones of the face. They connect to the inside of the nose. Sinusitis is an inflammation of the tissue lining the sinus cavity. Sinus inflammation can occur during a cold. It can also be due to allergies to pollens and other particles in the air. It can cause symptoms such as sinus congestion, headache, sore throat, facial swelling and fullness. It may also cause a low-grade fever. No infection is present, and no antibiotic treatment is needed.  Home care  · Drink plenty of water, hot tea, and other liquids. This may help thin mucus. It also may promote sinus drainage.  · Heat may help soothe painful areas of the face. Use a towel soaked in hot water. Or,  the shower and direct the hot spray onto your face. Using a vaporizer along with a menthol rub at night may also help.   · An expectorant containing guaifenesin may help thin the mucus and promote drainage from the sinuses.  · Over-the-counter decongestants may be used unless a similar medicine was prescribed. Nasal sprays work the fastest. Use one that contains phenylephrine or oxymetazoline. First blow the nose gently. Then use the spray. Do not use these medicines more often than directed on the label or symptoms may get worse. You may also use tablets containing pseudoephedrine. Avoid products that combine ingredients, because side effects may be increased. Read labels. You can also ask the pharmacist for help. (NOTE: Persons with high blood pressure should not use decongestants. They can raise blood pressure.)  · Over-the-counter antihistamines may help if allergies contributed to your sinusitis.    · Use acetaminophen or ibuprofen to control pain, unless another pain medicine was prescribed. (If you have chronic liver or kidney disease or ever had a stomach ulcer, talk with your doctor before using these medicines. Aspirin should never be used in anyone under 18 years of age  who is ill with a fever. It may cause severe liver damage.)  · Use nasal rinses or irrigation as instructed by your health care provider.  · Don't smoke. This can worsen symptoms.  Follow-up care  Follow up with your healthcare provider or our staff if you are not improving within the next week.  When to seek medical advice  Call your healthcare provider if any of these occur:  · Green or yellow discharge from the nose or into the throat  · Facial pain or headache becoming more severe  · Stiff neck  · Unusual drowsiness or confusion  · Swelling of the forehead or eyelids  · Vision problems, including blurred or double vision  · Fever of 100.4ºF (38ºC) or higher, or as directed by your healthcare provider  · Seizure  · Breathing problems  · Symptoms not resolving within 10 days  Date Last Reviewed: 4/13/2015  © 2458-7579 [employer]. [address] [place] [address]. All rights reserved. This information is not intended as a substitute for professional medical care. Always follow your healthcare professional's instructions.    You received a steroid shot today - this can elevate your blood pressure, elevate your blood sugar, water weight gain, nervous energy, redness to the face and dimpling of the skin where the shot goes in.   Thank you for enrolling in [facility]. Please follow the instructions below to securely access your online medical record. [facility] [facility] allows you to send messages to your doctor, view your test results, renew your prescriptions, schedule appointments, and more.     How Do I Sign Up?  1. In your Internet browser, go to [email or web address].  2. In the lower right of the page, click the Activate Now link located under the Have Access Code? Title.  3. Enter your [facility] Access Code exactly as it appears below. You will not need to use this code after youve completed the sign-up process. If you do not sign up before the expiration date, you must request a new code.  [facility]  Access Code: Activation code not generated  Current Patient Portal Status: Active    4. Enter Date of Birth (mm/dd/yyyy) as indicated and click the Next button. You will be taken to the next sign-up page.  5. Create a [facility] ID. This will be your new [facility] login ID and cannot be changed, so think of one that is secure and easy to remember.  6. Create a [facility] password.  Your password must be at least 8 characters long and contain at least 1 letter and 1 number.  You can change your password at any time.  7. Enter your Password Reset Question and Answer, then click the Next button.   8. Enter your e-mail address. You will receive e-mail notification when new information is available in [facility].  9. Click Sign Up. You can now view your medical record.     Additional Information  If you have questions, you can email [email or web address] or call [phone]  to talk to our [facility] staff. Remember, [facility] is NOT to be used for urgent needs. For medical emergencies, dial 911.     If not allergic,take tylenol (acetominophen) for fever control, chills, or body aches every 4 hours. Do not exceed 4000 mg/ day.If not allergic, take Motrin (Ibuprofen) every 4 hours for fever, chills, pain or inflammation. Do not exceed 2400 mg/day. You can alternate taking tylenol and motrin.    If you were prescribed a narcotic medication, do not drive or operate heavy equipment or machinery while taking these medications.  You must understand that you've received an Urgent Care treatment only and that you may be released before all your medical problems are known or treated. You, the patient, will arrange for follow up care as instructed.  Follow up with your PCP or specialty clinic as directed in the next 1-2 weeks if not improved or as needed.  You can call [phone] to schedule an appointment with the appropriate provider.  If your condition worsens we recommend that you receive another evaluation at the emergency  room immediately or contact your primary medical clinics after hours call service to discuss your concerns.    Please return here or go to the Emergency Department for any concerns or worsening of condition.    If you have been referred to another provider and wish to call to check on the status of your referral, please call [facility] at [phone]

## 2020-12-22 NOTE — PROGRESS NOTES
"Subjective:       Patient ID: Flor Burnham is a 50 y.o. female.    Vitals:  height is 5' 6" (1.676 m) and weight is 93.4 kg (206 lb). Her temperature is 97.6 °F (36.4 °C). Her blood pressure is 108/73 and her pulse is 86. Her respiration is 18 and oxygen saturation is 100%.     Chief Complaint: Sinus Problem    Patient c/o a postnasal drip, sinus burning, and headaches X 3 days.     Sinus Problem  This is a new problem. The current episode started in the past 7 days. The problem is unchanged. There has been no fever. Her pain is at a severity of 0/10. She is experiencing no pain. Associated symptoms include headaches. Pertinent negatives include no chills, congestion, coughing, diaphoresis, ear pain, hoarse voice, neck pain, shortness of breath, sinus pressure, sneezing, sore throat or swollen glands. Past treatments include acetaminophen. The treatment provided mild relief.       Constitution: Negative for chills, sweating, fatigue and fever.   HENT: Positive for postnasal drip. Negative for ear pain, congestion, sinus pressure and sore throat.    Neck: Negative for neck pain and painful lymph nodes.   Cardiovascular: Negative for chest pain and leg swelling.   Eyes: Negative for double vision and blurred vision.   Respiratory: Negative for cough and shortness of breath.    Gastrointestinal: Negative for nausea, vomiting and diarrhea.   Genitourinary: Negative for dysuria, frequency, urgency and history of kidney stones.   Musculoskeletal: Negative for joint pain, joint swelling, muscle cramps and muscle ache.   Skin: Negative for color change, pale, rash and bruising.   Allergic/Immunologic: Negative for seasonal allergies and sneezing.   Neurological: Positive for headaches. Negative for dizziness, history of vertigo, light-headedness and passing out.   Hematologic/Lymphatic: Negative for swollen lymph nodes.   Psychiatric/Behavioral: Negative for nervous/anxious, sleep disturbance and depression. The " patient is not nervous/anxious.        Objective:      Physical Exam   Constitutional: She is oriented to person, place, and time. She appears well-developed. She is cooperative.  Non-toxic appearance. She does not appear ill. No distress.   HENT:   Head: Normocephalic and atraumatic.   Ears:   Right Ear: Hearing, tympanic membrane, external ear and ear canal normal.   Left Ear: Hearing, tympanic membrane, external ear and ear canal normal.   Nose: Nose normal. No mucosal edema, rhinorrhea or nasal deformity. No epistaxis. Right sinus exhibits no maxillary sinus tenderness and no frontal sinus tenderness. Left sinus exhibits no maxillary sinus tenderness and no frontal sinus tenderness.   Mouth/Throat: Uvula is midline, oropharynx is clear and moist and mucous membranes are normal. No trismus in the jaw. Normal dentition. No uvula swelling. No oropharyngeal exudate, posterior oropharyngeal edema or posterior oropharyngeal erythema.   Eyes: Conjunctivae and lids are normal. No scleral icterus.   Neck: Trachea normal, full passive range of motion without pain and phonation normal. Neck supple. No neck rigidity. No edema and no erythema present.   Cardiovascular: Normal rate, regular rhythm, normal heart sounds and normal pulses.   Pulmonary/Chest: Effort normal and breath sounds normal. No respiratory distress. She has no decreased breath sounds. She has no rhonchi.   Abdominal: Normal appearance.   Musculoskeletal: Normal range of motion.         General: No deformity.   Neurological: She is alert and oriented to person, place, and time. She exhibits normal muscle tone. Coordination normal.   Skin: Skin is warm, dry, intact, not diaphoretic and not pale. Psychiatric: Her speech is normal and behavior is normal. Judgment and thought content normal.   Nursing note and vitals reviewed.        Assessment:       1. Sinus complaint    2. Sinus congestion    3. Viral URI        Plan:         Sinus complaint  -     POCT  COVID-19 Rapid Screening  -     fluticasone propionate (FLONASE) 50 mcg/actuation nasal spray; 1 spray (50 mcg total) by Each Nostril route 2 (two) times daily as needed for Rhinitis or Allergies.  Dispense: 15.8 mL; Refill: 1  -     sodium chloride (OCEAN NASAL) 0.65 % nasal spray; 1 spray by Nasal route as needed for Congestion.  Dispense: 45 mL; Refill: 3  -     betamethasone acetate-betamethasone sodium phosphate injection 6 mg    Sinus congestion  -     fluticasone propionate (FLONASE) 50 mcg/actuation nasal spray; 1 spray (50 mcg total) by Each Nostril route 2 (two) times daily as needed for Rhinitis or Allergies.  Dispense: 15.8 mL; Refill: 1  -     sodium chloride (OCEAN NASAL) 0.65 % nasal spray; 1 spray by Nasal route as needed for Congestion.  Dispense: 45 mL; Refill: 3  -     betamethasone acetate-betamethasone sodium phosphate injection 6 mg    Viral URI  -     fluticasone propionate (FLONASE) 50 mcg/actuation nasal spray; 1 spray (50 mcg total) by Each Nostril route 2 (two) times daily as needed for Rhinitis or Allergies.  Dispense: 15.8 mL; Refill: 1  -     sodium chloride (OCEAN NASAL) 0.65 % nasal spray; 1 spray by Nasal route as needed for Congestion.  Dispense: 45 mL; Refill: 3  -     betamethasone acetate-betamethasone sodium phosphate injection 6 mg    .  All applicable EKG, medical records, labs, imaging reviewed in Epic and discussed with patient.     Results for orders placed or performed in visit on 12/22/20   POCT COVID-19 Rapid Screening   Result Value Ref Range    POC Rapid COVID Negative Negative     Acceptable Yes        Patient Instructions     Sinusitis (No Antibiotics)    The sinuses are air-filled spaces within the bones of the face. They connect to the inside of the nose. Sinusitis is an inflammation of the tissue lining the sinus cavity. Sinus inflammation can occur during a cold. It can also be due to allergies to pollens and other particles in the air. It can  cause symptoms such as sinus congestion, headache, sore throat, facial swelling and fullness. It may also cause a low-grade fever. No infection is present, and no antibiotic treatment is needed.  Home care  · Drink plenty of water, hot tea, and other liquids. This may help thin mucus. It also may promote sinus drainage.  · Heat may help soothe painful areas of the face. Use a towel soaked in hot water. Or,  the shower and direct the hot spray onto your face. Using a vaporizer along with a menthol rub at night may also help.   · An expectorant containing guaifenesin may help thin the mucus and promote drainage from the sinuses.  · Over-the-counter decongestants may be used unless a similar medicine was prescribed. Nasal sprays work the fastest. Use one that contains phenylephrine or oxymetazoline. First blow the nose gently. Then use the spray. Do not use these medicines more often than directed on the label or symptoms may get worse. You may also use tablets containing pseudoephedrine. Avoid products that combine ingredients, because side effects may be increased. Read labels. You can also ask the pharmacist for help. (NOTE: Persons with high blood pressure should not use decongestants. They can raise blood pressure.)  · Over-the-counter antihistamines may help if allergies contributed to your sinusitis.    · Use acetaminophen or ibuprofen to control pain, unless another pain medicine was prescribed. (If you have chronic liver or kidney disease or ever had a stomach ulcer, talk with your doctor before using these medicines. Aspirin should never be used in anyone under 18 years of age who is ill with a fever. It may cause severe liver damage.)  · Use nasal rinses or irrigation as instructed by your health care provider.  · Don't smoke. This can worsen symptoms.  Follow-up care  Follow up with your healthcare provider or our staff if you are not improving within the next week.  When to seek medical  advice  Call your healthcare provider if any of these occur:  · Green or yellow discharge from the nose or into the throat  · Facial pain or headache becoming more severe  · Stiff neck  · Unusual drowsiness or confusion  · Swelling of the forehead or eyelids  · Vision problems, including blurred or double vision  · Fever of 100.4ºF (38ºC) or higher, or as directed by your healthcare provider  · Seizure  · Breathing problems  · Symptoms not resolving within 10 days  Date Last Reviewed: 4/13/2015  © 5448-8549 [employer]. [address] [place] [address]. All rights reserved. This information is not intended as a substitute for professional medical care. Always follow your healthcare professional's instructions.    You received a steroid shot today - this can elevate your blood pressure, elevate your blood sugar, water weight gain, nervous energy, redness to the face and dimpling of the skin where the shot goes in.   Thank you for enrolling in [facility]. Please follow the instructions below to securely access your online medical record. [employer] allows you to send messages to your doctor, view your test results, renew your prescriptions, schedule appointments, and more.     How Do I Sign Up?  1. In your Internet browser, go to [email or web address].  2. In the lower right of the page, click the Activate Now link located under the Have Access Code? Title.  3. Enter your [facility] Access Code exactly as it appears below. You will not need to use this code after youve completed the sign-up process. If you do not sign up before the expiration date, you must request a new code.  [facility] Access Code: Activation code not generated  Current Patient Portal Status: Active    4. Enter Date of Birth (mm/dd/yyyy) as indicated and click the Next button. You will be taken to the next sign-up page.  5. Create a [facility] ID. This will be your new [facility] login ID and cannot be changed, so think of one that is  secure and easy to remember.  6. Create a [facility] password.  Your password must be at least 8 characters long and contain at least 1 letter and 1 number.  You can change your password at any time.  7. Enter your Password Reset Question and Answer, then click the Next button.   8. Enter your e-mail address. You will receive e-mail notification when new information is available in [facility].  9. Click Sign Up. You can now view your medical record.     Additional Information  If you have questions, you can email [email or web address] or call [phone]  to talk to our [facility] staff. Remember, [facility] is NOT to be used for urgent needs. For medical emergencies, dial 911.     If not allergic,take tylenol (acetominophen) for fever control, chills, or body aches every 4 hours. Do not exceed 4000 mg/ day.If not allergic, take Motrin (Ibuprofen) every 4 hours for fever, chills, pain or inflammation. Do not exceed 2400 mg/day. You can alternate taking tylenol and motrin.    If you were prescribed a narcotic medication, do not drive or operate heavy equipment or machinery while taking these medications.  You must understand that you've received an Urgent Care treatment only and that you may be released before all your medical problems are known or treated. You, the patient, will arrange for follow up care as instructed.  Follow up with your PCP or specialty clinic as directed in the next 1-2 weeks if not improved or as needed.  You can call [phone] to schedule an appointment with the appropriate provider.  If your condition worsens we recommend that you receive another evaluation at the emergency room immediately or contact your primary medical clinics after hours call service to discuss your concerns.    Please return here or go to the Emergency Department for any concerns or worsening of condition.    If you have been referred to another provider and wish to call to check on the status of your referral, please  call [facility] at [phone]

## 2021-03-10 ENCOUNTER — IMMUNIZATION (OUTPATIENT)
Dept: PRIMARY CARE CLINIC | Facility: CLINIC | Age: 51
End: 2021-03-10
Payer: COMMERCIAL

## 2021-03-10 DIAGNOSIS — Z23 NEED FOR VACCINATION: Primary | ICD-10-CM

## 2021-03-10 PROCEDURE — 0031A PR IMMUNIZ ADMIN, SARS-COV-2 COVID-19 VACC, 5X10VP/0.5ML: ICD-10-PCS | Mod: CV19,S$GLB,, | Performed by: INTERNAL MEDICINE

## 2021-03-10 PROCEDURE — 91303 PR SARSCOV2 VAC AD26 .5ML IM: ICD-10-PCS | Mod: S$GLB,,, | Performed by: INTERNAL MEDICINE

## 2021-03-10 PROCEDURE — 0031A PR IMMUNIZ ADMIN, SARS-COV-2 COVID-19 VACC, 5X10VP/0.5ML: CPT | Mod: CV19,S$GLB,, | Performed by: INTERNAL MEDICINE

## 2021-03-10 PROCEDURE — 91303 PR SARSCOV2 VAC AD26 .5ML IM: CPT | Mod: S$GLB,,, | Performed by: INTERNAL MEDICINE

## 2021-03-23 ENCOUNTER — TELEPHONE (OUTPATIENT)
Dept: FAMILY MEDICINE | Facility: CLINIC | Age: 51
End: 2021-03-23

## 2021-03-31 LAB
HUMAN PAPILLOMAVIRUS (HPV): POSITIVE
PAP SMEAR: NORMAL

## 2021-04-07 ENCOUNTER — CLINICAL SUPPORT (OUTPATIENT)
Dept: FAMILY MEDICINE | Facility: CLINIC | Age: 51
End: 2021-04-07
Payer: COMMERCIAL

## 2021-04-07 VITALS — SYSTOLIC BLOOD PRESSURE: 124 MMHG | HEART RATE: 74 BPM | DIASTOLIC BLOOD PRESSURE: 68 MMHG

## 2021-04-07 PROCEDURE — 99999 PR PBB SHADOW E&M-EST. PATIENT-LVL I: CPT | Mod: PBBFAC,,,

## 2021-04-07 PROCEDURE — 99999 PR PBB SHADOW E&M-EST. PATIENT-LVL I: ICD-10-PCS | Mod: PBBFAC,,,

## 2021-04-15 RX ORDER — LISINOPRIL 20 MG/1
20 TABLET ORAL DAILY
COMMUNITY
Start: 2021-03-14 | End: 2021-05-26 | Stop reason: SDUPTHER

## 2021-04-16 ENCOUNTER — OFFICE VISIT (OUTPATIENT)
Dept: UROLOGY | Facility: CLINIC | Age: 51
End: 2021-04-16
Payer: COMMERCIAL

## 2021-04-16 VITALS — BODY MASS INDEX: 36.55 KG/M2 | HEIGHT: 65 IN | WEIGHT: 219.38 LBS

## 2021-04-16 DIAGNOSIS — Z87.442 HISTORY OF KIDNEY STONES: Primary | ICD-10-CM

## 2021-04-16 PROCEDURE — 99999 PR PBB SHADOW E&M-EST. PATIENT-LVL III: CPT | Mod: PBBFAC,,, | Performed by: UROLOGY

## 2021-04-16 PROCEDURE — 99203 OFFICE O/P NEW LOW 30 MIN: CPT | Mod: S$GLB,,, | Performed by: UROLOGY

## 2021-04-16 PROCEDURE — 99999 PR PBB SHADOW E&M-EST. PATIENT-LVL III: ICD-10-PCS | Mod: PBBFAC,,, | Performed by: UROLOGY

## 2021-04-16 PROCEDURE — 3008F BODY MASS INDEX DOCD: CPT | Mod: CPTII,S$GLB,, | Performed by: UROLOGY

## 2021-04-16 PROCEDURE — 1125F AMNT PAIN NOTED PAIN PRSNT: CPT | Mod: S$GLB,,, | Performed by: UROLOGY

## 2021-04-16 PROCEDURE — 1125F PR PAIN SEVERITY QUANTIFIED, PAIN PRESENT: ICD-10-PCS | Mod: S$GLB,,, | Performed by: UROLOGY

## 2021-04-16 PROCEDURE — 99203 PR OFFICE/OUTPT VISIT, NEW, LEVL III, 30-44 MIN: ICD-10-PCS | Mod: S$GLB,,, | Performed by: UROLOGY

## 2021-04-16 PROCEDURE — 3008F PR BODY MASS INDEX (BMI) DOCUMENTED: ICD-10-PCS | Mod: CPTII,S$GLB,, | Performed by: UROLOGY

## 2021-04-16 RX ORDER — NAPROXEN 500 MG/1
500 TABLET ORAL 2 TIMES DAILY
COMMUNITY
Start: 2021-04-10 | End: 2021-06-28

## 2021-05-21 DIAGNOSIS — I10 ESSENTIAL HYPERTENSION: ICD-10-CM

## 2021-05-21 DIAGNOSIS — E78.2 MIXED HYPERLIPIDEMIA: ICD-10-CM

## 2021-05-21 DIAGNOSIS — R73.02 GLUCOSE INTOLERANCE (IMPAIRED GLUCOSE TOLERANCE): Primary | ICD-10-CM

## 2021-05-21 DIAGNOSIS — M83.9 VITAMIN D DEFICIENT OSTEOMALACIA: ICD-10-CM

## 2021-05-21 DIAGNOSIS — Z00.00 WELLNESS EXAMINATION: ICD-10-CM

## 2021-05-21 RX ORDER — PHENTERMINE AND TOPIRAMATE 7.5; 46 MG/1; MG/1
CAPSULE, EXTENDED RELEASE ORAL
Qty: 90 CAPSULE | Refills: 1 | Status: CANCELLED | OUTPATIENT
Start: 2021-05-21

## 2021-05-24 RX ORDER — HYDROCHLOROTHIAZIDE 12.5 MG/1
12.5 CAPSULE ORAL DAILY
Qty: 90 CAPSULE | Refills: 1 | Status: SHIPPED | OUTPATIENT
Start: 2021-05-24 | End: 2021-11-17

## 2021-05-24 RX ORDER — METOPROLOL SUCCINATE 25 MG/1
25 TABLET, EXTENDED RELEASE ORAL NIGHTLY
Qty: 90 TABLET | Refills: 1 | Status: SHIPPED | OUTPATIENT
Start: 2021-05-24 | End: 2021-12-16 | Stop reason: SDUPTHER

## 2021-05-27 RX ORDER — LISINOPRIL 20 MG/1
20 TABLET ORAL 2 TIMES DAILY
Qty: 180 TABLET | Refills: 1 | Status: SHIPPED | OUTPATIENT
Start: 2021-05-27 | End: 2021-06-28 | Stop reason: CLARIF

## 2021-06-08 ENCOUNTER — PATIENT MESSAGE (OUTPATIENT)
Dept: FAMILY MEDICINE | Facility: CLINIC | Age: 51
End: 2021-06-08

## 2021-06-08 RX ORDER — PHENTERMINE AND TOPIRAMATE 7.5; 46 MG/1; MG/1
CAPSULE, EXTENDED RELEASE ORAL
Qty: 30 CAPSULE | Refills: 2 | Status: CANCELLED | OUTPATIENT
Start: 2021-06-08

## 2021-06-08 RX ORDER — PHENTERMINE AND TOPIRAMATE 7.5; 46 MG/1; MG/1
1 CAPSULE, EXTENDED RELEASE ORAL DAILY
Qty: 7 CAPSULE | Refills: 0 | Status: SHIPPED | OUTPATIENT
Start: 2021-06-08 | End: 2021-06-09 | Stop reason: DRUGHIGH

## 2021-06-08 RX ORDER — PHENTERMINE AND TOPIRAMATE 7.5; 46 MG/1; MG/1
1 CAPSULE, EXTENDED RELEASE ORAL DAILY
Qty: 30 CAPSULE | Refills: 1 | Status: SHIPPED | OUTPATIENT
Start: 2021-06-08 | End: 2021-06-09 | Stop reason: DRUGHIGH

## 2021-06-09 RX ORDER — PHENTERMINE AND TOPIRAMATE 11.25; 69 MG/1; MG/1
1 CAPSULE, EXTENDED RELEASE ORAL ONCE
Qty: 7 CAPSULE | Refills: 0 | Status: SHIPPED | OUTPATIENT
Start: 2021-06-09 | End: 2021-06-09

## 2021-06-21 ENCOUNTER — PATIENT MESSAGE (OUTPATIENT)
Dept: FAMILY MEDICINE | Facility: CLINIC | Age: 51
End: 2021-06-21

## 2021-06-23 LAB
25(OH)D3+25(OH)D2 SERPL-MCNC: 55.8 NG/ML (ref 30–100)
ALBUMIN SERPL-MCNC: 4.2 G/DL (ref 3.8–4.9)
ALBUMIN/GLOB SERPL: 1.7 {RATIO} (ref 1.2–2.2)
ALP SERPL-CCNC: 84 IU/L (ref 48–121)
ALT SERPL-CCNC: 9 IU/L (ref 0–32)
AST SERPL-CCNC: 15 IU/L (ref 0–40)
BASOPHILS # BLD AUTO: 0 X10E3/UL (ref 0–0.2)
BASOPHILS NFR BLD AUTO: 1 %
BILIRUB SERPL-MCNC: 0.4 MG/DL (ref 0–1.2)
BUN SERPL-MCNC: 17 MG/DL (ref 6–24)
BUN/CREAT SERPL: 22 (ref 9–23)
CALCIUM SERPL-MCNC: 9.2 MG/DL (ref 8.7–10.2)
CHLORIDE SERPL-SCNC: 103 MMOL/L (ref 96–106)
CHOLEST SERPL-MCNC: 182 MG/DL (ref 100–199)
CO2 SERPL-SCNC: 25 MMOL/L (ref 20–29)
CREAT SERPL-MCNC: 0.78 MG/DL (ref 0.57–1)
EOSINOPHIL # BLD AUTO: 0.2 X10E3/UL (ref 0–0.4)
EOSINOPHIL NFR BLD AUTO: 3 %
ERYTHROCYTE [DISTWIDTH] IN BLOOD BY AUTOMATED COUNT: 12.7 % (ref 11.7–15.4)
GLOBULIN SER CALC-MCNC: 2.5 G/DL (ref 1.5–4.5)
GLUCOSE SERPL-MCNC: 94 MG/DL (ref 65–99)
HBA1C MFR BLD: 5.5 % (ref 4.8–5.6)
HCT VFR BLD AUTO: 43.3 % (ref 34–46.6)
HDLC SERPL-MCNC: 53 MG/DL
HGB BLD-MCNC: 14.3 G/DL (ref 11.1–15.9)
IMM GRANULOCYTES # BLD AUTO: 0 X10E3/UL (ref 0–0.1)
IMM GRANULOCYTES NFR BLD AUTO: 0 %
LDLC SERPL CALC-MCNC: 116 MG/DL (ref 0–99)
LYMPHOCYTES # BLD AUTO: 2.4 X10E3/UL (ref 0.7–3.1)
LYMPHOCYTES NFR BLD AUTO: 33 %
MCH RBC QN AUTO: 31.3 PG (ref 26.6–33)
MCHC RBC AUTO-ENTMCNC: 33 G/DL (ref 31.5–35.7)
MCV RBC AUTO: 95 FL (ref 79–97)
MONOCYTES # BLD AUTO: 0.5 X10E3/UL (ref 0.1–0.9)
MONOCYTES NFR BLD AUTO: 7 %
NEUTROPHILS # BLD AUTO: 4 X10E3/UL (ref 1.4–7)
NEUTROPHILS NFR BLD AUTO: 56 %
PLATELET # BLD AUTO: 236 X10E3/UL (ref 150–450)
POTASSIUM SERPL-SCNC: 4.1 MMOL/L (ref 3.5–5.2)
PROT SERPL-MCNC: 6.7 G/DL (ref 6–8.5)
RBC # BLD AUTO: 4.57 X10E6/UL (ref 3.77–5.28)
SODIUM SERPL-SCNC: 140 MMOL/L (ref 134–144)
TRIGL SERPL-MCNC: 67 MG/DL (ref 0–149)
TSH SERPL DL<=0.005 MIU/L-ACNC: 1.08 UIU/ML (ref 0.45–4.5)
VLDLC SERPL CALC-MCNC: 13 MG/DL (ref 5–40)
WBC # BLD AUTO: 7.2 X10E3/UL (ref 3.4–10.8)

## 2021-06-28 ENCOUNTER — OFFICE VISIT (OUTPATIENT)
Dept: FAMILY MEDICINE | Facility: CLINIC | Age: 51
End: 2021-06-28
Payer: COMMERCIAL

## 2021-06-28 VITALS
SYSTOLIC BLOOD PRESSURE: 112 MMHG | DIASTOLIC BLOOD PRESSURE: 78 MMHG | HEIGHT: 65 IN | RESPIRATION RATE: 16 BRPM | HEART RATE: 88 BPM | BODY MASS INDEX: 36.62 KG/M2 | WEIGHT: 219.81 LBS

## 2021-06-28 DIAGNOSIS — M83.9 VITAMIN D DEFICIENT OSTEOMALACIA: ICD-10-CM

## 2021-06-28 DIAGNOSIS — E66.9 OBESITY (BMI 30-39.9): ICD-10-CM

## 2021-06-28 DIAGNOSIS — Z78.0 MENOPAUSE: ICD-10-CM

## 2021-06-28 DIAGNOSIS — E78.2 MIXED HYPERLIPIDEMIA: ICD-10-CM

## 2021-06-28 DIAGNOSIS — Z00.00 WELLNESS EXAMINATION: Primary | ICD-10-CM

## 2021-06-28 DIAGNOSIS — I10 ESSENTIAL HYPERTENSION: ICD-10-CM

## 2021-06-28 PROCEDURE — 90471 TDAP VACCINE GREATER THAN OR EQUAL TO 7YO IM: ICD-10-PCS | Mod: S$GLB,,, | Performed by: INTERNAL MEDICINE

## 2021-06-28 PROCEDURE — 99999 PR PBB SHADOW E&M-EST. PATIENT-LVL III: CPT | Mod: PBBFAC,,, | Performed by: INTERNAL MEDICINE

## 2021-06-28 PROCEDURE — 1126F PR PAIN SEVERITY QUANTIFIED, NO PAIN PRESENT: ICD-10-PCS | Mod: S$GLB,,, | Performed by: INTERNAL MEDICINE

## 2021-06-28 PROCEDURE — 3008F PR BODY MASS INDEX (BMI) DOCUMENTED: ICD-10-PCS | Mod: CPTII,S$GLB,, | Performed by: INTERNAL MEDICINE

## 2021-06-28 PROCEDURE — 3008F BODY MASS INDEX DOCD: CPT | Mod: CPTII,S$GLB,, | Performed by: INTERNAL MEDICINE

## 2021-06-28 PROCEDURE — 99396 PR PREVENTIVE VISIT,EST,40-64: ICD-10-PCS | Mod: 25,S$GLB,, | Performed by: INTERNAL MEDICINE

## 2021-06-28 PROCEDURE — 90715 TDAP VACCINE GREATER THAN OR EQUAL TO 7YO IM: ICD-10-PCS | Mod: S$GLB,,, | Performed by: INTERNAL MEDICINE

## 2021-06-28 PROCEDURE — 99396 PREV VISIT EST AGE 40-64: CPT | Mod: 25,S$GLB,, | Performed by: INTERNAL MEDICINE

## 2021-06-28 PROCEDURE — 99999 PR PBB SHADOW E&M-EST. PATIENT-LVL III: ICD-10-PCS | Mod: PBBFAC,,, | Performed by: INTERNAL MEDICINE

## 2021-06-28 PROCEDURE — 90471 IMMUNIZATION ADMIN: CPT | Mod: S$GLB,,, | Performed by: INTERNAL MEDICINE

## 2021-06-28 PROCEDURE — 90715 TDAP VACCINE 7 YRS/> IM: CPT | Mod: S$GLB,,, | Performed by: INTERNAL MEDICINE

## 2021-06-28 PROCEDURE — 1126F AMNT PAIN NOTED NONE PRSNT: CPT | Mod: S$GLB,,, | Performed by: INTERNAL MEDICINE

## 2021-06-28 RX ORDER — ASPIRIN 325 MG
50000 TABLET, DELAYED RELEASE (ENTERIC COATED) ORAL
COMMUNITY
Start: 2021-04-23 | End: 2024-03-07

## 2021-06-28 RX ORDER — PHENTERMINE AND TOPIRAMATE 15; 92 MG/1; MG/1
1 CAPSULE, EXTENDED RELEASE ORAL DAILY
Qty: 30 CAPSULE | Refills: 5 | Status: SHIPPED | OUTPATIENT
Start: 2021-06-28 | End: 2021-07-08

## 2021-06-28 RX ORDER — LISINOPRIL 20 MG/1
20 TABLET ORAL DAILY
COMMUNITY
End: 2021-11-17

## 2021-06-28 RX ORDER — PROGESTERONE 200 MG/1
200 CAPSULE ORAL
COMMUNITY
Start: 2021-04-27

## 2021-07-07 ENCOUNTER — PATIENT MESSAGE (OUTPATIENT)
Dept: ADMINISTRATIVE | Facility: HOSPITAL | Age: 51
End: 2021-07-07

## 2021-07-26 ENCOUNTER — PATIENT OUTREACH (OUTPATIENT)
Dept: ADMINISTRATIVE | Facility: HOSPITAL | Age: 51
End: 2021-07-26

## 2021-07-26 ENCOUNTER — OFFICE VISIT (OUTPATIENT)
Dept: URGENT CARE | Facility: CLINIC | Age: 51
End: 2021-07-26
Payer: COMMERCIAL

## 2021-07-26 VITALS
TEMPERATURE: 98 F | RESPIRATION RATE: 19 BRPM | HEART RATE: 76 BPM | BODY MASS INDEX: 36.49 KG/M2 | SYSTOLIC BLOOD PRESSURE: 138 MMHG | WEIGHT: 219 LBS | DIASTOLIC BLOOD PRESSURE: 82 MMHG | OXYGEN SATURATION: 99 % | HEIGHT: 65 IN

## 2021-07-26 DIAGNOSIS — R09.81 SINUS CONGESTION: Primary | ICD-10-CM

## 2021-07-26 DIAGNOSIS — J02.9 SORE THROAT: ICD-10-CM

## 2021-07-26 LAB
CTP QC/QA: YES
CTP QC/QA: YES
MOLECULAR STREP A: NEGATIVE
SARS-COV-2 RDRP RESP QL NAA+PROBE: NEGATIVE

## 2021-07-26 PROCEDURE — U0002: ICD-10-PCS | Mod: QW,S$GLB,, | Performed by: FAMILY MEDICINE

## 2021-07-26 PROCEDURE — 99214 PR OFFICE/OUTPT VISIT, EST, LEVL IV, 30-39 MIN: ICD-10-PCS | Mod: S$GLB,,, | Performed by: FAMILY MEDICINE

## 2021-07-26 PROCEDURE — 3075F SYST BP GE 130 - 139MM HG: CPT | Mod: CPTII,S$GLB,, | Performed by: FAMILY MEDICINE

## 2021-07-26 PROCEDURE — 87651 POCT STREP A MOLECULAR: ICD-10-PCS | Mod: QW,S$GLB,, | Performed by: FAMILY MEDICINE

## 2021-07-26 PROCEDURE — 87651 STREP A DNA AMP PROBE: CPT | Mod: QW,S$GLB,, | Performed by: FAMILY MEDICINE

## 2021-07-26 PROCEDURE — 3079F PR MOST RECENT DIASTOLIC BLOOD PRESSURE 80-89 MM HG: ICD-10-PCS | Mod: CPTII,S$GLB,, | Performed by: FAMILY MEDICINE

## 2021-07-26 PROCEDURE — 1160F PR REVIEW ALL MEDS BY PRESCRIBER/CLIN PHARMACIST DOCUMENTED: ICD-10-PCS | Mod: CPTII,S$GLB,, | Performed by: FAMILY MEDICINE

## 2021-07-26 PROCEDURE — 3008F BODY MASS INDEX DOCD: CPT | Mod: CPTII,S$GLB,, | Performed by: FAMILY MEDICINE

## 2021-07-26 PROCEDURE — 3079F DIAST BP 80-89 MM HG: CPT | Mod: CPTII,S$GLB,, | Performed by: FAMILY MEDICINE

## 2021-07-26 PROCEDURE — 3075F PR MOST RECENT SYSTOLIC BLOOD PRESS GE 130-139MM HG: ICD-10-PCS | Mod: CPTII,S$GLB,, | Performed by: FAMILY MEDICINE

## 2021-07-26 PROCEDURE — 3008F PR BODY MASS INDEX (BMI) DOCUMENTED: ICD-10-PCS | Mod: CPTII,S$GLB,, | Performed by: FAMILY MEDICINE

## 2021-07-26 PROCEDURE — 1159F PR MEDICATION LIST DOCUMENTED IN MEDICAL RECORD: ICD-10-PCS | Mod: CPTII,S$GLB,, | Performed by: FAMILY MEDICINE

## 2021-07-26 PROCEDURE — 99214 OFFICE O/P EST MOD 30 MIN: CPT | Mod: S$GLB,,, | Performed by: FAMILY MEDICINE

## 2021-07-26 PROCEDURE — 1160F RVW MEDS BY RX/DR IN RCRD: CPT | Mod: CPTII,S$GLB,, | Performed by: FAMILY MEDICINE

## 2021-07-26 PROCEDURE — U0002 COVID-19 LAB TEST NON-CDC: HCPCS | Mod: QW,S$GLB,, | Performed by: FAMILY MEDICINE

## 2021-07-26 PROCEDURE — 1159F MED LIST DOCD IN RCRD: CPT | Mod: CPTII,S$GLB,, | Performed by: FAMILY MEDICINE

## 2021-07-26 RX ORDER — PROMETHAZINE HYDROCHLORIDE 6.25 MG/5ML
SYRUP ORAL
Qty: 120 ML | Refills: 1 | Status: SHIPPED | OUTPATIENT
Start: 2021-07-26 | End: 2022-08-18

## 2021-07-28 ENCOUNTER — OFFICE VISIT (OUTPATIENT)
Dept: URGENT CARE | Facility: CLINIC | Age: 51
End: 2021-07-28
Payer: COMMERCIAL

## 2021-07-28 ENCOUNTER — PATIENT MESSAGE (OUTPATIENT)
Dept: FAMILY MEDICINE | Facility: CLINIC | Age: 51
End: 2021-07-28

## 2021-07-28 VITALS
SYSTOLIC BLOOD PRESSURE: 129 MMHG | RESPIRATION RATE: 19 BRPM | HEIGHT: 65 IN | HEART RATE: 80 BPM | BODY MASS INDEX: 36.49 KG/M2 | DIASTOLIC BLOOD PRESSURE: 74 MMHG | OXYGEN SATURATION: 98 % | WEIGHT: 219 LBS | TEMPERATURE: 98 F

## 2021-07-28 DIAGNOSIS — Z20.822 CONTACT WITH AND (SUSPECTED) EXPOSURE TO COVID-19: Primary | ICD-10-CM

## 2021-07-28 DIAGNOSIS — U07.1 COVID-19: ICD-10-CM

## 2021-07-28 DIAGNOSIS — U07.1 COVID-19 VIRUS DETECTED: ICD-10-CM

## 2021-07-28 LAB
CTP QC/QA: YES
SARS-COV-2 RDRP RESP QL NAA+PROBE: POSITIVE

## 2021-07-28 PROCEDURE — U0002: ICD-10-PCS | Mod: QW,S$GLB,, | Performed by: INTERNAL MEDICINE

## 2021-07-28 PROCEDURE — 3078F DIAST BP <80 MM HG: CPT | Mod: CPTII,S$GLB,, | Performed by: INTERNAL MEDICINE

## 2021-07-28 PROCEDURE — U0002 COVID-19 LAB TEST NON-CDC: HCPCS | Mod: QW,S$GLB,, | Performed by: INTERNAL MEDICINE

## 2021-07-28 PROCEDURE — 3074F PR MOST RECENT SYSTOLIC BLOOD PRESSURE < 130 MM HG: ICD-10-PCS | Mod: CPTII,S$GLB,, | Performed by: INTERNAL MEDICINE

## 2021-07-28 PROCEDURE — 3078F PR MOST RECENT DIASTOLIC BLOOD PRESSURE < 80 MM HG: ICD-10-PCS | Mod: CPTII,S$GLB,, | Performed by: INTERNAL MEDICINE

## 2021-07-28 PROCEDURE — 3074F SYST BP LT 130 MM HG: CPT | Mod: CPTII,S$GLB,, | Performed by: INTERNAL MEDICINE

## 2021-07-28 PROCEDURE — 3008F BODY MASS INDEX DOCD: CPT | Mod: CPTII,S$GLB,, | Performed by: INTERNAL MEDICINE

## 2021-07-28 PROCEDURE — 99213 OFFICE O/P EST LOW 20 MIN: CPT | Mod: S$GLB,,, | Performed by: INTERNAL MEDICINE

## 2021-07-28 PROCEDURE — 3008F PR BODY MASS INDEX (BMI) DOCUMENTED: ICD-10-PCS | Mod: CPTII,S$GLB,, | Performed by: INTERNAL MEDICINE

## 2021-07-28 PROCEDURE — 99213 PR OFFICE/OUTPT VISIT, EST, LEVL III, 20-29 MIN: ICD-10-PCS | Mod: S$GLB,,, | Performed by: INTERNAL MEDICINE

## 2021-07-28 RX ORDER — PROMETHAZINE HYDROCHLORIDE AND DEXTROMETHORPHAN HYDROBROMIDE 6.25; 15 MG/5ML; MG/5ML
5 SYRUP ORAL 3 TIMES DAILY
Qty: 105 ML | Refills: 0 | Status: SHIPPED | OUTPATIENT
Start: 2021-07-28 | End: 2021-08-04

## 2021-07-30 ENCOUNTER — NURSE TRIAGE (OUTPATIENT)
Dept: ADMINISTRATIVE | Facility: CLINIC | Age: 51
End: 2021-07-30

## 2021-08-01 ENCOUNTER — NURSE TRIAGE (OUTPATIENT)
Dept: ADMINISTRATIVE | Facility: CLINIC | Age: 51
End: 2021-08-01

## 2021-08-04 ENCOUNTER — PATIENT MESSAGE (OUTPATIENT)
Dept: FAMILY MEDICINE | Facility: CLINIC | Age: 51
End: 2021-08-04

## 2021-08-06 ENCOUNTER — OFFICE VISIT (OUTPATIENT)
Dept: FAMILY MEDICINE | Facility: CLINIC | Age: 51
End: 2021-08-06
Payer: COMMERCIAL

## 2021-08-06 DIAGNOSIS — J32.9 SINUSITIS, UNSPECIFIED CHRONICITY, UNSPECIFIED LOCATION: Primary | ICD-10-CM

## 2021-08-06 PROBLEM — N39.0 UTI (URINARY TRACT INFECTION): Status: RESOLVED | Noted: 2019-09-11 | Resolved: 2021-08-06

## 2021-08-06 PROCEDURE — 1159F MED LIST DOCD IN RCRD: CPT | Mod: CPTII,,, | Performed by: NURSE PRACTITIONER

## 2021-08-06 PROCEDURE — 99213 PR OFFICE/OUTPT VISIT, EST, LEVL III, 20-29 MIN: ICD-10-PCS | Mod: 95,,, | Performed by: NURSE PRACTITIONER

## 2021-08-06 PROCEDURE — 1160F RVW MEDS BY RX/DR IN RCRD: CPT | Mod: CPTII,,, | Performed by: NURSE PRACTITIONER

## 2021-08-06 PROCEDURE — 1160F PR REVIEW ALL MEDS BY PRESCRIBER/CLIN PHARMACIST DOCUMENTED: ICD-10-PCS | Mod: CPTII,,, | Performed by: NURSE PRACTITIONER

## 2021-08-06 PROCEDURE — 1159F PR MEDICATION LIST DOCUMENTED IN MEDICAL RECORD: ICD-10-PCS | Mod: CPTII,,, | Performed by: NURSE PRACTITIONER

## 2021-08-06 PROCEDURE — 99213 OFFICE O/P EST LOW 20 MIN: CPT | Mod: 95,,, | Performed by: NURSE PRACTITIONER

## 2021-08-06 RX ORDER — METHYLPREDNISOLONE 4 MG/1
TABLET ORAL
Qty: 1 PACKAGE | Refills: 0 | Status: SHIPPED | OUTPATIENT
Start: 2021-08-06 | End: 2021-08-27

## 2021-08-06 RX ORDER — AMOXICILLIN AND CLAVULANATE POTASSIUM 875; 125 MG/1; MG/1
1 TABLET, FILM COATED ORAL EVERY 12 HOURS
Qty: 14 TABLET | Refills: 0 | Status: SHIPPED | OUTPATIENT
Start: 2021-08-06 | End: 2021-08-13

## 2021-10-28 ENCOUNTER — PATIENT MESSAGE (OUTPATIENT)
Dept: FAMILY MEDICINE | Facility: CLINIC | Age: 51
End: 2021-10-28
Payer: COMMERCIAL

## 2021-10-28 RX ORDER — PHENTERMINE AND TOPIRAMATE 11.25; 69 MG/1; MG/1
1 CAPSULE, EXTENDED RELEASE ORAL DAILY
Qty: 30 CAPSULE | Refills: 1 | Status: SHIPPED | OUTPATIENT
Start: 2021-10-28 | End: 2021-11-27

## 2021-11-22 ENCOUNTER — PATIENT OUTREACH (OUTPATIENT)
Dept: ADMINISTRATIVE | Facility: HOSPITAL | Age: 51
End: 2021-11-22
Payer: COMMERCIAL

## 2021-12-16 ENCOUNTER — OFFICE VISIT (OUTPATIENT)
Dept: FAMILY MEDICINE | Facility: CLINIC | Age: 51
End: 2021-12-16
Payer: COMMERCIAL

## 2021-12-16 VITALS
BODY MASS INDEX: 37.5 KG/M2 | DIASTOLIC BLOOD PRESSURE: 74 MMHG | SYSTOLIC BLOOD PRESSURE: 124 MMHG | HEIGHT: 65 IN | WEIGHT: 225.06 LBS

## 2021-12-16 DIAGNOSIS — R09.81 SINUS CONGESTION: Primary | ICD-10-CM

## 2021-12-16 DIAGNOSIS — I10 ESSENTIAL HYPERTENSION: ICD-10-CM

## 2021-12-16 DIAGNOSIS — E88.810 METABOLIC SYNDROME: ICD-10-CM

## 2021-12-16 DIAGNOSIS — E78.2 MIXED HYPERLIPIDEMIA: ICD-10-CM

## 2021-12-16 DIAGNOSIS — E66.9 OBESITY (BMI 30-39.9): ICD-10-CM

## 2021-12-16 DIAGNOSIS — Z00.00 WELLNESS EXAMINATION: ICD-10-CM

## 2021-12-16 PROCEDURE — 99214 PR OFFICE/OUTPT VISIT, EST, LEVL IV, 30-39 MIN: ICD-10-PCS | Mod: S$GLB,,, | Performed by: INTERNAL MEDICINE

## 2021-12-16 PROCEDURE — 99999 PR PBB SHADOW E&M-EST. PATIENT-LVL III: CPT | Mod: PBBFAC,,, | Performed by: INTERNAL MEDICINE

## 2021-12-16 PROCEDURE — 3008F BODY MASS INDEX DOCD: CPT | Mod: CPTII,S$GLB,, | Performed by: INTERNAL MEDICINE

## 2021-12-16 PROCEDURE — 1159F MED LIST DOCD IN RCRD: CPT | Mod: CPTII,S$GLB,, | Performed by: INTERNAL MEDICINE

## 2021-12-16 PROCEDURE — 4010F PR ACE/ARB THEARPY RXD/TAKEN: ICD-10-PCS | Mod: CPTII,S$GLB,, | Performed by: INTERNAL MEDICINE

## 2021-12-16 PROCEDURE — 3008F PR BODY MASS INDEX (BMI) DOCUMENTED: ICD-10-PCS | Mod: CPTII,S$GLB,, | Performed by: INTERNAL MEDICINE

## 2021-12-16 PROCEDURE — 4010F ACE/ARB THERAPY RXD/TAKEN: CPT | Mod: CPTII,S$GLB,, | Performed by: INTERNAL MEDICINE

## 2021-12-16 PROCEDURE — 3074F SYST BP LT 130 MM HG: CPT | Mod: CPTII,S$GLB,, | Performed by: INTERNAL MEDICINE

## 2021-12-16 PROCEDURE — 1159F PR MEDICATION LIST DOCUMENTED IN MEDICAL RECORD: ICD-10-PCS | Mod: CPTII,S$GLB,, | Performed by: INTERNAL MEDICINE

## 2021-12-16 PROCEDURE — 1160F RVW MEDS BY RX/DR IN RCRD: CPT | Mod: CPTII,S$GLB,, | Performed by: INTERNAL MEDICINE

## 2021-12-16 PROCEDURE — 1160F PR REVIEW ALL MEDS BY PRESCRIBER/CLIN PHARMACIST DOCUMENTED: ICD-10-PCS | Mod: CPTII,S$GLB,, | Performed by: INTERNAL MEDICINE

## 2021-12-16 PROCEDURE — 99999 PR PBB SHADOW E&M-EST. PATIENT-LVL III: ICD-10-PCS | Mod: PBBFAC,,, | Performed by: INTERNAL MEDICINE

## 2021-12-16 PROCEDURE — 3074F PR MOST RECENT SYSTOLIC BLOOD PRESSURE < 130 MM HG: ICD-10-PCS | Mod: CPTII,S$GLB,, | Performed by: INTERNAL MEDICINE

## 2021-12-16 PROCEDURE — 3078F DIAST BP <80 MM HG: CPT | Mod: CPTII,S$GLB,, | Performed by: INTERNAL MEDICINE

## 2021-12-16 PROCEDURE — 99214 OFFICE O/P EST MOD 30 MIN: CPT | Mod: S$GLB,,, | Performed by: INTERNAL MEDICINE

## 2021-12-16 PROCEDURE — 3078F PR MOST RECENT DIASTOLIC BLOOD PRESSURE < 80 MM HG: ICD-10-PCS | Mod: CPTII,S$GLB,, | Performed by: INTERNAL MEDICINE

## 2021-12-16 RX ORDER — METOPROLOL SUCCINATE 25 MG/1
25 TABLET, EXTENDED RELEASE ORAL NIGHTLY
Qty: 90 TABLET | Refills: 2 | Status: SHIPPED | OUTPATIENT
Start: 2021-12-16 | End: 2021-12-27

## 2021-12-16 RX ORDER — HYDROCHLOROTHIAZIDE 12.5 MG/1
CAPSULE ORAL
Qty: 90 CAPSULE | Refills: 2 | Status: SHIPPED | OUTPATIENT
Start: 2021-12-16 | End: 2023-02-27 | Stop reason: SDUPTHER

## 2021-12-16 RX ORDER — FLUTICASONE PROPIONATE 50 MCG
1 SPRAY, SUSPENSION (ML) NASAL 2 TIMES DAILY PRN
Qty: 15.8 ML | Refills: 12 | Status: SHIPPED | OUTPATIENT
Start: 2021-12-16 | End: 2023-02-27 | Stop reason: SDUPTHER

## 2021-12-27 PROBLEM — E88.810 METABOLIC SYNDROME: Status: ACTIVE | Noted: 2021-12-27

## 2021-12-27 RX ORDER — METOPROLOL SUCCINATE 25 MG/1
TABLET, EXTENDED RELEASE ORAL
Qty: 90 TABLET | Refills: 0 | Status: SHIPPED | OUTPATIENT
Start: 2021-12-27 | End: 2022-09-30 | Stop reason: SDUPTHER

## 2022-01-02 ENCOUNTER — PATIENT MESSAGE (OUTPATIENT)
Dept: ADMINISTRATIVE | Facility: HOSPITAL | Age: 52
End: 2022-01-02
Payer: COMMERCIAL

## 2022-01-05 DIAGNOSIS — Z12.11 COLON CANCER SCREENING: ICD-10-CM

## 2022-05-18 ENCOUNTER — PATIENT MESSAGE (OUTPATIENT)
Dept: FAMILY MEDICINE | Facility: CLINIC | Age: 52
End: 2022-05-18
Payer: COMMERCIAL

## 2022-07-13 ENCOUNTER — PATIENT MESSAGE (OUTPATIENT)
Dept: FAMILY MEDICINE | Facility: CLINIC | Age: 52
End: 2022-07-13
Payer: COMMERCIAL

## 2022-07-13 DIAGNOSIS — Z00.00 WELLNESS EXAMINATION: Primary | ICD-10-CM

## 2022-07-13 NOTE — TELEPHONE ENCOUNTER
Care Due:                  Date            Visit Type   Department     Provider  --------------------------------------------------------------------------------                                MYCHART                              FOLLOWUP/OF  [facility]  Last Visit: 12-      FICE VISIT   MEDICINE       Jono Wasserman  Next Visit: None Scheduled  None         None Found                                                            Last  Test          Frequency    Reason                     Performed    Due Date  --------------------------------------------------------------------------------    CMP.........  12 months..  hydroCHLOROthiazide,       08- 07-                             lisinopriL...............    [employer] Embedded Care Gaps. Reference number: [id number]. 7/13/2022   10:51:00 AM CDT

## 2022-07-13 NOTE — TELEPHONE ENCOUNTER
Sent in month   She needs 6  Visit for more refills make her an appt.       Also needs yearly fasting labs done before visit = sent to [facility]

## 2022-07-15 ENCOUNTER — PATIENT MESSAGE (OUTPATIENT)
Dept: FAMILY MEDICINE | Facility: CLINIC | Age: 52
End: 2022-07-15
Payer: COMMERCIAL

## 2022-07-19 LAB
ALBUMIN SERPL-MCNC: 4.7 G/DL (ref 3.8–4.9)
ALBUMIN/GLOB SERPL: 2.1 {RATIO} (ref 1.2–2.2)
ALP SERPL-CCNC: 90 IU/L (ref 44–121)
ALT SERPL-CCNC: 12 IU/L (ref 0–32)
AST SERPL-CCNC: 14 IU/L (ref 0–40)
BASOPHILS # BLD AUTO: 0.1 X10E3/UL (ref 0–0.2)
BASOPHILS NFR BLD AUTO: 1 %
BILIRUB SERPL-MCNC: 0.3 MG/DL (ref 0–1.2)
BUN SERPL-MCNC: 15 MG/DL (ref 6–24)
BUN/CREAT SERPL: 24 (ref 9–23)
CALCIUM SERPL-MCNC: 9.3 MG/DL (ref 8.7–10.2)
CHLORIDE SERPL-SCNC: 100 MMOL/L (ref 96–106)
CHOLEST SERPL-MCNC: 229 MG/DL (ref 100–199)
CO2 SERPL-SCNC: 27 MMOL/L (ref 20–29)
CREAT SERPL-MCNC: 0.63 MG/DL (ref 0.57–1)
EOSINOPHIL # BLD AUTO: 0.2 X10E3/UL (ref 0–0.4)
EOSINOPHIL NFR BLD AUTO: 2 %
ERYTHROCYTE [DISTWIDTH] IN BLOOD BY AUTOMATED COUNT: 13 % (ref 11.7–15.4)
EST. GFR  (NO RACE VARIABLE): 106 ML/MIN/1.73
GLOBULIN SER CALC-MCNC: 2.2 G/DL (ref 1.5–4.5)
GLUCOSE SERPL-MCNC: 91 MG/DL (ref 65–99)
HCT VFR BLD AUTO: 48.9 % (ref 34–46.6)
HDLC SERPL-MCNC: 61 MG/DL
HGB BLD-MCNC: 15.8 G/DL (ref 11.1–15.9)
IMM GRANULOCYTES # BLD AUTO: 0.1 X10E3/UL (ref 0–0.1)
IMM GRANULOCYTES NFR BLD AUTO: 1 %
LDLC SERPL CALC-MCNC: 156 MG/DL (ref 0–99)
LYMPHOCYTES # BLD AUTO: 2.7 X10E3/UL (ref 0.7–3.1)
LYMPHOCYTES NFR BLD AUTO: 26 %
MCH RBC QN AUTO: 29.7 PG (ref 26.6–33)
MCHC RBC AUTO-ENTMCNC: 32.3 G/DL (ref 31.5–35.7)
MCV RBC AUTO: 92 FL (ref 79–97)
MONOCYTES # BLD AUTO: 0.7 X10E3/UL (ref 0.1–0.9)
MONOCYTES NFR BLD AUTO: 6 %
NEUTROPHILS # BLD AUTO: 6.7 X10E3/UL (ref 1.4–7)
NEUTROPHILS NFR BLD AUTO: 64 %
PLATELET # BLD AUTO: 233 X10E3/UL (ref 150–450)
POTASSIUM SERPL-SCNC: 3.7 MMOL/L (ref 3.5–5.2)
PROT SERPL-MCNC: 6.9 G/DL (ref 6–8.5)
RBC # BLD AUTO: 5.32 X10E6/UL (ref 3.77–5.28)
SODIUM SERPL-SCNC: 140 MMOL/L (ref 134–144)
TRIGL SERPL-MCNC: 70 MG/DL (ref 0–149)
TSH SERPL DL<=0.005 MIU/L-ACNC: 1.48 UIU/ML (ref 0.45–4.5)
VLDLC SERPL CALC-MCNC: 12 MG/DL (ref 5–40)
WBC # BLD AUTO: 10.3 X10E3/UL (ref 3.4–10.8)

## 2022-08-18 ENCOUNTER — OFFICE VISIT (OUTPATIENT)
Dept: FAMILY MEDICINE | Facility: CLINIC | Age: 52
End: 2022-08-18
Payer: COMMERCIAL

## 2022-08-18 VITALS
SYSTOLIC BLOOD PRESSURE: 118 MMHG | HEIGHT: 65 IN | BODY MASS INDEX: 37.3 KG/M2 | DIASTOLIC BLOOD PRESSURE: 72 MMHG | WEIGHT: 223.88 LBS

## 2022-08-18 DIAGNOSIS — E78.2 MIXED HYPERLIPIDEMIA: ICD-10-CM

## 2022-08-18 DIAGNOSIS — Z86.79 HISTORY OF ATRIAL FIBRILLATION: ICD-10-CM

## 2022-08-18 DIAGNOSIS — I10 ESSENTIAL HYPERTENSION: ICD-10-CM

## 2022-08-18 DIAGNOSIS — Z01.89 ENCOUNTER FOR ROUTINE LABORATORY TESTING: ICD-10-CM

## 2022-08-18 DIAGNOSIS — E66.9 OBESITY (BMI 30-39.9): ICD-10-CM

## 2022-08-18 DIAGNOSIS — R00.2 HEART PALPITATIONS: ICD-10-CM

## 2022-08-18 DIAGNOSIS — E88.810 METABOLIC SYNDROME: ICD-10-CM

## 2022-08-18 DIAGNOSIS — E78.5 HYPERLIPIDEMIA, UNSPECIFIED HYPERLIPIDEMIA TYPE: ICD-10-CM

## 2022-08-18 DIAGNOSIS — R01.1 HEART MURMUR: ICD-10-CM

## 2022-08-18 DIAGNOSIS — Z00.00 WELLNESS EXAMINATION: Primary | ICD-10-CM

## 2022-08-18 DIAGNOSIS — E66.01 SEVERE OBESITY (BMI 35.0-39.9) WITH COMORBIDITY: ICD-10-CM

## 2022-08-18 PROCEDURE — 1160F PR REVIEW ALL MEDS BY PRESCRIBER/CLIN PHARMACIST DOCUMENTED: ICD-10-PCS | Mod: CPTII,S$GLB,, | Performed by: INTERNAL MEDICINE

## 2022-08-18 PROCEDURE — 1160F RVW MEDS BY RX/DR IN RCRD: CPT | Mod: CPTII,S$GLB,, | Performed by: INTERNAL MEDICINE

## 2022-08-18 PROCEDURE — 1159F MED LIST DOCD IN RCRD: CPT | Mod: CPTII,S$GLB,, | Performed by: INTERNAL MEDICINE

## 2022-08-18 PROCEDURE — 3078F PR MOST RECENT DIASTOLIC BLOOD PRESSURE < 80 MM HG: ICD-10-PCS | Mod: CPTII,S$GLB,, | Performed by: INTERNAL MEDICINE

## 2022-08-18 PROCEDURE — 99999 PR PBB SHADOW E&M-EST. PATIENT-LVL III: CPT | Mod: PBBFAC,,, | Performed by: INTERNAL MEDICINE

## 2022-08-18 PROCEDURE — 3074F SYST BP LT 130 MM HG: CPT | Mod: CPTII,S$GLB,, | Performed by: INTERNAL MEDICINE

## 2022-08-18 PROCEDURE — 3008F PR BODY MASS INDEX (BMI) DOCUMENTED: ICD-10-PCS | Mod: CPTII,S$GLB,, | Performed by: INTERNAL MEDICINE

## 2022-08-18 PROCEDURE — 3074F PR MOST RECENT SYSTOLIC BLOOD PRESSURE < 130 MM HG: ICD-10-PCS | Mod: CPTII,S$GLB,, | Performed by: INTERNAL MEDICINE

## 2022-08-18 PROCEDURE — 1159F PR MEDICATION LIST DOCUMENTED IN MEDICAL RECORD: ICD-10-PCS | Mod: CPTII,S$GLB,, | Performed by: INTERNAL MEDICINE

## 2022-08-18 PROCEDURE — 3078F DIAST BP <80 MM HG: CPT | Mod: CPTII,S$GLB,, | Performed by: INTERNAL MEDICINE

## 2022-08-18 PROCEDURE — 99396 PR PREVENTIVE VISIT,EST,40-64: ICD-10-PCS | Mod: S$GLB,,, | Performed by: INTERNAL MEDICINE

## 2022-08-18 PROCEDURE — 99999 PR PBB SHADOW E&M-EST. PATIENT-LVL III: ICD-10-PCS | Mod: PBBFAC,,, | Performed by: INTERNAL MEDICINE

## 2022-08-18 PROCEDURE — 99396 PREV VISIT EST AGE 40-64: CPT | Mod: S$GLB,,, | Performed by: INTERNAL MEDICINE

## 2022-08-18 PROCEDURE — 3008F BODY MASS INDEX DOCD: CPT | Mod: CPTII,S$GLB,, | Performed by: INTERNAL MEDICINE

## 2022-08-18 RX ORDER — ONDANSETRON 4 MG/1
4 TABLET, ORALLY DISINTEGRATING ORAL EVERY 6 HOURS PRN
Qty: 30 TABLET | Refills: 0 | Status: SHIPPED | OUTPATIENT
Start: 2022-08-18

## 2022-08-18 RX ORDER — SEMAGLUTIDE 1.34 MG/ML
INJECTION, SOLUTION SUBCUTANEOUS
Qty: 2 PEN | Refills: 2 | Status: SHIPPED | OUTPATIENT
Start: 2022-08-18 | End: 2023-02-27

## 2022-08-18 NOTE — PROGRESS NOTES
"Subjective:       Patient ID: Flor Feliz is a 52 y.o. female.    Chief Complaint: Weight Loss (Would like to switch to Ozempic) and Annual Exam    HPI       Gyn: 5/2022  Dr De La O   MMG:  3/2022  Colonoscopy:  2019 Dr. Ratliff r/c 10 years  Immunizations: Flu:  Tdap: 6/28/21  Shingles: recommend Covid: yes   Smoker:  No       Has been doing Optivia and not losing weight.  Frustrated - want to try Ozempic.   Metabolic syndrome BMI 36: up to Rx Qsymia 11.25 -- increased dose 15 easily irritability reduced down and resolved.     + heart murmur hx of A fib episode years ago seen at  Dr Arrieta approx 2006.   HLD:  Uncontrolled //   Hypertension:  Controlled:   Rx : lisinopril 20 metoprolol 25  Vitamin-D deficiency:  Gyn placed prescription dose will start over the counter after completed  Allergies controlled      Review of Systems:  Review of Systems   Constitutional: Negative for appetite change.   HENT: Negative for nosebleeds.    Eyes: Negative for pain.   Respiratory: Negative for choking.    Cardiovascular: Negative for chest pain.   Gastrointestinal: Negative for blood in stool.   Genitourinary: Negative for hematuria.   Musculoskeletal: Negative for joint swelling.   Skin: Negative for pallor.   Neurological: Negative for facial asymmetry.   Hematological: Does not bruise/bleed easily.   Psychiatric/Behavioral: Negative for confusion.       Objective:     Vitals:    08/18/22 1659   BP: 118/72   Weight: 101.5 kg (223 lb 14 oz)   Height: 5' 5" (1.651 m)          Physical Exam  Vitals reviewed.   Constitutional:       Appearance: Normal appearance.   HENT:      Head: Normocephalic and atraumatic.      Mouth/Throat:      Pharynx: Oropharynx is clear.   Eyes:      Extraocular Movements: Extraocular movements intact.      Conjunctiva/sclera: Conjunctivae normal.      Pupils: Pupils are equal, round, and reactive to light.   Cardiovascular:      Rate and Rhythm: Normal rate and regular rhythm.      " Heart sounds: Murmur heard.   Pulmonary:      Effort: Pulmonary effort is normal.      Breath sounds: Normal breath sounds.   Abdominal:      General: Bowel sounds are normal.      Palpations: Abdomen is soft.   Musculoskeletal:         General: Normal range of motion.      Cervical back: Normal range of motion and neck supple.   Skin:     General: Skin is warm and dry.   Neurological:      General: No focal deficit present.      Mental Status: She is alert and oriented to person, place, and time.   Psychiatric:         Mood and Affect: Mood normal.         Medication List with Changes/Refills   New Medications    ONDANSETRON (ZOFRAN-ODT) 4 MG TBDL    Take 1 tablet (4 mg total) by mouth every 6 (six) hours as needed.    SEMAGLUTIDE (OZEMPIC) 0.25 MG OR 0.5 MG(2 MG/1.5 ML) PEN INJECTOR    Inject 0.25 mg SQ weekly x 4 wks; then increase to 0.5 mg SQ weekly   Current Medications    CETIRIZINE 10 MG CHEWABLE TABLET    Take 10 mg by mouth once daily.    CHOLECALCIFEROL, VITAMIN D3, 1,250 MCG (50,000 UNIT) CAPSULE    Take 50,000 Units by mouth every 7 days.    FLUTICASONE PROPIONATE (FLONASE) 50 MCG/ACTUATION NASAL SPRAY    1 spray (50 mcg total) by Each Nostril route 2 (two) times daily as needed for Rhinitis or Allergies.    HYDROCHLOROTHIAZIDE (MICROZIDE) 12.5 MG CAPSULE    TAKE 1 CAPSULE(12.5 MG) BY MOUTH EVERY DAY    LISINOPRIL (PRINIVIL,ZESTRIL) 20 MG TABLET    TAKE 1 TABLET(20 MG) BY MOUTH TWICE DAILY    METOPROLOL SUCCINATE (TOPROL-XL) 25 MG 24 HR TABLET    TAKE 1 TABLET(25 MG) BY MOUTH EVERY EVENING    PROGESTERONE (PROMETRIUM) 200 MG CAPSULE    TAKE ONE CAPSULE BY MOUTH EVERY NIGHT AT BEDTIME ON DAYS 1 THROUGH 12 PER MONTH   Changed and/or Refilled Medications    Modified Medication Previous Medication    PHENTERMINE-TOPIRAMATE (QSYMIA) 11.25-69 MG CM24 phentermine-topiramate (QSYMIA) 11.25-69 mg CM24       Take 1 capsule by mouth once daily.    Take 1 capsule by mouth once daily.   Discontinued Medications     PROMETHAZINE (PHENERGAN) 6.25 MG/5 ML SYRUP    10mL at night as needed       Assessment & Plan:  1. Wellness examination    2. Heart murmur  - Echo; Future    3. Heart palpitations    4. Hyperlipidemia, unspecified hyperlipidemia type    5. Metabolic syndrome  - semaglutide (OZEMPIC) 0.25 mg or 0.5 mg(2 mg/1.5 mL) pen injector; Inject 0.25 mg SQ weekly x 4 wks; then increase to 0.5 mg SQ weekly  Dispense: 2 pen; Refill: 2    6. Obesity (BMI 30-39.9)  - semaglutide (OZEMPIC) 0.25 mg or 0.5 mg(2 mg/1.5 mL) pen injector; Inject 0.25 mg SQ weekly x 4 wks; then increase to 0.5 mg SQ weekly  Dispense: 2 pen; Refill: 2    7. History of atrial fibrillation    8. Encounter for routine laboratory testing  - Rubella Antibody, IgG; Future  - Mumps, IgG Screen; Future  - Rubeola Antibody IgG; Future  - Hepatitis C Antibody; Future  - POLIOVIRUS ANTIBODIES, TYPES 1, 2, AND 3; Future  - Hepatitis B Surface Ab, Qualitative; Future  - Rubella Antibody, IgG  - Mumps, IgG Screen  - Rubeola Antibody IgG  - Hepatitis C Antibody  - POLIOVIRUS ANTIBODIES, TYPES 1, 2, AND 3  - Hepatitis B Surface Ab, Qualitative    9. Essential hypertension    10. Mixed hyperlipidemia    11. Severe obesity (BMI 35.0-39.9) with comorbidity     Wellness examination    Heart murmur  -     Echo; Future    Heart palpitations    Hyperlipidemia, unspecified hyperlipidemia type    Metabolic syndrome  -     semaglutide (OZEMPIC) 0.25 mg or 0.5 mg(2 mg/1.5 mL) pen injector; Inject 0.25 mg SQ weekly x 4 wks; then increase to 0.5 mg SQ weekly  Dispense: 2 pen; Refill: 2    Obesity (BMI 30-39.9)  -     semaglutide (OZEMPIC) 0.25 mg or 0.5 mg(2 mg/1.5 mL) pen injector; Inject 0.25 mg SQ weekly x 4 wks; then increase to 0.5 mg SQ weekly  Dispense: 2 pen; Refill: 2    History of atrial fibrillation    Encounter for routine laboratory testing  -     Rubella Antibody, IgG; Future; Expected date: 08/18/2022  -     Mumps, IgG Screen; Future; Expected date: 08/18/2022  -      Rubeola Antibody IgG; Future; Expected date: 08/18/2022  -     Hepatitis C Antibody; Future; Expected date: 08/18/2022  -     POLIOVIRUS ANTIBODIES, TYPES 1, 2, AND 3; Future; Expected date: 08/18/2022  -     Hepatitis B Surface Ab, Qualitative; Future; Expected date: 08/18/2022    Essential hypertension    Mixed hyperlipidemia    Severe obesity (BMI 35.0-39.9) with comorbidity    Other orders  -     ondansetron (ZOFRAN-ODT) 4 MG TbDL; Take 1 tablet (4 mg total) by mouth every 6 (six) hours as needed.  Dispense: 30 tablet; Refill: 0  -     phentermine-topiramate (QSYMIA) 11.25-69 mg CM24; Take 1 capsule by mouth once daily.  Dispense: 30 capsule; Refill: 4        Continue to work on regular exercise, maintain healthy weight, balanced diet. Avoid unhealthy habits: smoking, excessive alcohol intake.

## 2022-08-21 ENCOUNTER — PATIENT MESSAGE (OUTPATIENT)
Dept: FAMILY MEDICINE | Facility: CLINIC | Age: 52
End: 2022-08-21
Payer: COMMERCIAL

## 2022-08-21 PROBLEM — E66.01 SEVERE OBESITY (BMI 35.0-39.9) WITH COMORBIDITY: Status: ACTIVE | Noted: 2022-08-21

## 2022-08-22 NOTE — TELEPHONE ENCOUNTER
Care Due:                  Date            Visit Type   Department     Provider  --------------------------------------------------------------------------------                                EP -                              PRIMARY      [facility]  Last Visit: 08-      CARE ([facility])   MEDICINE       Jono Wasserman                               -                              [facility]  Next Visit: 02-      CARE ([facility])   MEDICINE       Jono Wasserman                                                            Last  Test          Frequency    Reason                     Performed    Due Date  --------------------------------------------------------------------------------    HBA1C.......  6 months...  semaglutide..............  06- 12-    [facility] Embedded Care Gaps. Reference number: [id number]. 8/22/2022   10:18:41 AM CDT

## 2022-09-16 ENCOUNTER — CLINICAL SUPPORT (OUTPATIENT)
Dept: CARDIOLOGY | Facility: HOSPITAL | Age: 52
End: 2022-09-16
Attending: INTERNAL MEDICINE
Payer: COMMERCIAL

## 2022-09-16 VITALS
HEIGHT: 65 IN | SYSTOLIC BLOOD PRESSURE: 118 MMHG | DIASTOLIC BLOOD PRESSURE: 72 MMHG | BODY MASS INDEX: 37.15 KG/M2 | WEIGHT: 223 LBS

## 2022-09-16 DIAGNOSIS — R01.1 HEART MURMUR: ICD-10-CM

## 2022-09-16 LAB
ASCENDING AORTA: 2.62 CM
AV INDEX (PROSTH): 0.67
AV MEAN GRADIENT: 7 MMHG
AV PEAK GRADIENT: 13 MMHG
AV VALVE AREA: 2.08 CM2
AV VELOCITY RATIO: 0.65
BSA FOR ECHO PROCEDURE: 2.15 M2
CV ECHO LV RWT: 0.49 CM
DOP CALC AO PEAK VEL: 1.78 M/S
DOP CALC AO VTI: 39 CM
DOP CALC LVOT AREA: 3.1 CM2
DOP CALC LVOT DIAMETER: 1.99 CM
DOP CALC LVOT PEAK VEL: 1.16 M/S
DOP CALC LVOT STROKE VOLUME: 81.14 CM3
DOP CALCLVOT PEAK VEL VTI: 26.1 CM
E WAVE DECELERATION TIME: 185.31 MSEC
E/A RATIO: 1.37
E/E' RATIO: 8.73 M/S
ECHO LV POSTERIOR WALL: 1 CM (ref 0.6–1.1)
EJECTION FRACTION: 60 %
FRACTIONAL SHORTENING: 29 % (ref 28–44)
INTERVENTRICULAR SEPTUM: 1.04 CM (ref 0.6–1.1)
IVRT: 100.86 MSEC
LA MAJOR: 5.37 CM
LA MINOR: 4.79 CM
LA WIDTH: 3.6 CM
LEFT ATRIUM SIZE: 3.69 CM
LEFT ATRIUM VOLUME INDEX: 27.6 ML/M2
LEFT ATRIUM VOLUME: 57.17 CM3
LEFT INTERNAL DIMENSION IN SYSTOLE: 2.88 CM (ref 2.1–4)
LEFT VENTRICLE DIASTOLIC VOLUME INDEX: 34.93 ML/M2
LEFT VENTRICLE DIASTOLIC VOLUME: 72.31 ML
LEFT VENTRICLE MASS INDEX: 65 G/M2
LEFT VENTRICLE SYSTOLIC VOLUME INDEX: 15.4 ML/M2
LEFT VENTRICLE SYSTOLIC VOLUME: 31.78 ML
LEFT VENTRICULAR INTERNAL DIMENSION IN DIASTOLE: 4.06 CM (ref 3.5–6)
LEFT VENTRICULAR MASS: 133.77 G
LV LATERAL E/E' RATIO: 6.4 M/S
LV SEPTAL E/E' RATIO: 13.71 M/S
LVOT MG: 2.91 MMHG
LVOT MV: 0.79 CM/S
MV PEAK A VEL: 0.7 M/S
MV PEAK E VEL: 0.96 M/S
MV STENOSIS PRESSURE HALF TIME: 53.74 MS
MV VALVE AREA P 1/2 METHOD: 4.09 CM2
PISA MRMAX VEL: 4.85 M/S
PISA TR MAX VEL: 2.63 M/S
PULM VEIN S/D RATIO: 1.46
PV PEAK D VEL: 0.46 M/S
PV PEAK S VEL: 0.67 M/S
RA MAJOR: 4.54 CM
RA PRESSURE: 3 MMHG
RA WIDTH: 3.69 CM
RIGHT VENTRICULAR END-DIASTOLIC DIMENSION: 3.87 CM
RIGHT VENTRICULAR LENGTH IN DIASTOLE (APICAL 4-CHAMBER VIEW): 7 CM
RV MID DIAMA: 3.27 CM
RV TISSUE DOPPLER FREE WALL SYSTOLIC VELOCITY 1 (APICAL 4 CHAMBER VIEW): 0.01 CM/S
SINUS: 2.65 CM
STJ: 2.48 CM
TDI LATERAL: 0.15 M/S
TDI SEPTAL: 0.07 M/S
TDI: 0.11 M/S
TR MAX PG: 28 MMHG
TRICUSPID ANNULAR PLANE SYSTOLIC EXCURSION: 1.78 CM
TV REST PULMONARY ARTERY PRESSURE: 31 MMHG

## 2022-09-16 PROCEDURE — 93306 TTE W/DOPPLER COMPLETE: CPT | Mod: PO

## 2022-09-16 PROCEDURE — 93306 ECHO (CUPID ONLY): ICD-10-PCS | Mod: 26,,, | Performed by: INTERNAL MEDICINE

## 2022-09-16 PROCEDURE — 93306 TTE W/DOPPLER COMPLETE: CPT | Mod: 26,,, | Performed by: INTERNAL MEDICINE

## 2022-09-19 ENCOUNTER — PATIENT MESSAGE (OUTPATIENT)
Dept: FAMILY MEDICINE | Facility: CLINIC | Age: 52
End: 2022-09-19
Payer: COMMERCIAL

## 2022-09-30 ENCOUNTER — PATIENT MESSAGE (OUTPATIENT)
Dept: FAMILY MEDICINE | Facility: CLINIC | Age: 52
End: 2022-09-30
Payer: COMMERCIAL

## 2022-09-30 DIAGNOSIS — R00.2 HEART PALPITATIONS: Primary | ICD-10-CM

## 2022-09-30 RX ORDER — METOPROLOL SUCCINATE 25 MG/1
TABLET, EXTENDED RELEASE ORAL
Qty: 90 TABLET | Refills: 3 | OUTPATIENT
Start: 2022-09-30

## 2022-09-30 RX ORDER — METOPROLOL SUCCINATE 25 MG/1
25 TABLET, EXTENDED RELEASE ORAL NIGHTLY
Qty: 10 TABLET | Refills: 0 | Status: SHIPPED | OUTPATIENT
Start: 2022-09-30 | End: 2023-01-09 | Stop reason: SDUPTHER

## 2022-09-30 NOTE — TELEPHONE ENCOUNTER
Refill Routing Note   Medication(s) are not appropriate for processing by [facility] for the following reason(s):      - Required vitals are outdated    [facility] action(s):  Defer          Medication reconciliation completed: No     Appointments  past 12m or future 3m with PCP    Date Provider   Last Visit   8/18/2022 Jono Wasserman MD   Next Visit   2/27/2023 Jono Wasserman MD   ED visits in past 90 days: 0        Note composed:2:19 PM 09/30/2022

## 2022-09-30 NOTE — TELEPHONE ENCOUNTER
No new care gaps identified.  [facility] Embedded Care Gaps. Reference number: [id number]. 9/30/2022   9:33:42 AM TRICIAT

## 2022-09-30 NOTE — TELEPHONE ENCOUNTER
No new care gaps identified.  [facility] Embedded Care Gaps. Reference number: [id number]. 9/30/2022   2:16:42 PM CDT

## 2022-11-30 ENCOUNTER — OFFICE VISIT (OUTPATIENT)
Dept: URGENT CARE | Facility: CLINIC | Age: 52
End: 2022-11-30
Payer: COMMERCIAL

## 2022-11-30 VITALS
BODY MASS INDEX: 34.99 KG/M2 | SYSTOLIC BLOOD PRESSURE: 144 MMHG | RESPIRATION RATE: 16 BRPM | HEIGHT: 65 IN | HEART RATE: 72 BPM | DIASTOLIC BLOOD PRESSURE: 83 MMHG | WEIGHT: 210 LBS | TEMPERATURE: 98 F | OXYGEN SATURATION: 99 %

## 2022-11-30 DIAGNOSIS — J06.9 UPPER RESPIRATORY TRACT INFECTION, UNSPECIFIED TYPE: Primary | ICD-10-CM

## 2022-11-30 DIAGNOSIS — R05.9 COUGH, UNSPECIFIED TYPE: ICD-10-CM

## 2022-11-30 LAB
CTP QC/QA: YES
CTP QC/QA: YES
POC MOLECULAR INFLUENZA A AGN: NEGATIVE
POC MOLECULAR INFLUENZA B AGN: NEGATIVE
SARS-COV-2 AG RESP QL IA.RAPID: NEGATIVE

## 2022-11-30 PROCEDURE — 96372 THER/PROPH/DIAG INJ SC/IM: CPT | Mod: S$GLB,,, | Performed by: INTERNAL MEDICINE

## 2022-11-30 PROCEDURE — 87502 POCT INFLUENZA A/B MOLECULAR: ICD-10-PCS | Mod: QW,S$GLB,, | Performed by: INTERNAL MEDICINE

## 2022-11-30 PROCEDURE — 3077F SYST BP >= 140 MM HG: CPT | Mod: CPTII,S$GLB,, | Performed by: INTERNAL MEDICINE

## 2022-11-30 PROCEDURE — 96372 PR INJECTION,THERAP/PROPH/DIAG2ST, IM OR SUBCUT: ICD-10-PCS | Mod: S$GLB,,, | Performed by: INTERNAL MEDICINE

## 2022-11-30 PROCEDURE — 1159F PR MEDICATION LIST DOCUMENTED IN MEDICAL RECORD: ICD-10-PCS | Mod: CPTII,S$GLB,, | Performed by: INTERNAL MEDICINE

## 2022-11-30 PROCEDURE — 87811 SARS-COV-2 COVID19 W/OPTIC: CPT | Mod: QW,S$GLB,, | Performed by: INTERNAL MEDICINE

## 2022-11-30 PROCEDURE — 99213 PR OFFICE/OUTPT VISIT, EST, LEVL III, 20-29 MIN: ICD-10-PCS | Mod: 25,S$GLB,, | Performed by: INTERNAL MEDICINE

## 2022-11-30 PROCEDURE — 3008F BODY MASS INDEX DOCD: CPT | Mod: CPTII,S$GLB,, | Performed by: INTERNAL MEDICINE

## 2022-11-30 PROCEDURE — 3008F PR BODY MASS INDEX (BMI) DOCUMENTED: ICD-10-PCS | Mod: CPTII,S$GLB,, | Performed by: INTERNAL MEDICINE

## 2022-11-30 PROCEDURE — 1159F MED LIST DOCD IN RCRD: CPT | Mod: CPTII,S$GLB,, | Performed by: INTERNAL MEDICINE

## 2022-11-30 PROCEDURE — 3077F PR MOST RECENT SYSTOLIC BLOOD PRESSURE >= 140 MM HG: ICD-10-PCS | Mod: CPTII,S$GLB,, | Performed by: INTERNAL MEDICINE

## 2022-11-30 PROCEDURE — 99213 OFFICE O/P EST LOW 20 MIN: CPT | Mod: 25,S$GLB,, | Performed by: INTERNAL MEDICINE

## 2022-11-30 PROCEDURE — 3079F DIAST BP 80-89 MM HG: CPT | Mod: CPTII,S$GLB,, | Performed by: INTERNAL MEDICINE

## 2022-11-30 PROCEDURE — 87502 INFLUENZA DNA AMP PROBE: CPT | Mod: QW,S$GLB,, | Performed by: INTERNAL MEDICINE

## 2022-11-30 PROCEDURE — 3079F PR MOST RECENT DIASTOLIC BLOOD PRESSURE 80-89 MM HG: ICD-10-PCS | Mod: CPTII,S$GLB,, | Performed by: INTERNAL MEDICINE

## 2022-11-30 PROCEDURE — 87811 SARS CORONAVIRUS 2 ANTIGEN POCT, MANUAL READ: ICD-10-PCS | Mod: QW,S$GLB,, | Performed by: INTERNAL MEDICINE

## 2022-11-30 RX ORDER — DEXAMETHASONE SODIUM PHOSPHATE 100 MG/10ML
10 INJECTION INTRAMUSCULAR; INTRAVENOUS
Status: COMPLETED | OUTPATIENT
Start: 2022-11-30 | End: 2022-11-30

## 2022-11-30 RX ADMIN — DEXAMETHASONE SODIUM PHOSPHATE 10 MG: 100 INJECTION INTRAMUSCULAR; INTRAVENOUS at 12:11

## 2022-11-30 NOTE — PROGRESS NOTES
"Subjective:       Patient ID: Flor Feliz is a 52 y.o. female.    Vitals:  height is 5' 5" (1.651 m) and weight is 95.3 kg (210 lb). Her oral temperature is 97.9 °F (36.6 °C). Her blood pressure is 144/83 (abnormal) and her pulse is 72. Her respiration is 16 and oxygen saturation is 99%.     Chief Complaint: Cough    Pt presents to urgent care with sore throat, post nasal drip, runny nose, and cough.Pt symptoms started 6 days ago. Pt is taking otc cold meds.    Cough  This is a new problem. The current episode started in the past 7 days. The problem has been gradually worsening. The problem occurs constantly. Cough characteristics: yellow phlem. Associated symptoms include headaches, nasal congestion, postnasal drip and a sore throat. Pertinent negatives include no chest pain, chills, ear congestion, ear pain, fever, heartburn, hemoptysis, myalgias, rash, rhinorrhea, shortness of breath, sweats, weight loss or wheezing. The symptoms are aggravated by lying down. She has tried OTC cough suppressant for the symptoms. The treatment provided no relief. There is no history of asthma, bronchiectasis, bronchitis, COPD, emphysema, environmental allergies or pneumonia.     Constitution: Negative for chills and fever.   HENT:  Positive for postnasal drip and sore throat. Negative for ear pain.    Cardiovascular:  Negative for chest pain.   Respiratory:  Positive for cough. Negative for bloody sputum, shortness of breath and wheezing.    Gastrointestinal:  Negative for heartburn.   Musculoskeletal:  Negative for muscle ache.   Skin:  Negative for rash.   Allergic/Immunologic: Negative for environmental allergies.   Neurological:  Positive for headaches.     Objective:      Physical Exam   Constitutional: She is oriented to person, place, and time. She appears well-developed. She is cooperative.  Non-toxic appearance. She does not appear ill. No distress.   HENT:   Head: Normocephalic and atraumatic.   Ears:   Right " Ear: Hearing, tympanic membrane, external ear and ear canal normal.   Left Ear: Hearing, tympanic membrane, external ear and ear canal normal.   Nose: Rhinorrhea present. No mucosal edema or nasal deformity. No epistaxis. Right sinus exhibits no maxillary sinus tenderness and no frontal sinus tenderness. Left sinus exhibits no maxillary sinus tenderness and no frontal sinus tenderness.   Mouth/Throat: Uvula is midline and mucous membranes are normal. No trismus in the jaw. Normal dentition. No uvula swelling. Posterior oropharyngeal erythema present. No oropharyngeal exudate or posterior oropharyngeal edema.   Eyes: Conjunctivae and lids are normal. No scleral icterus.   Neck: Trachea normal and phonation normal. Neck supple. No edema present. No erythema present. No neck rigidity present.   Cardiovascular: Normal rate, regular rhythm, normal heart sounds and normal pulses.   Pulmonary/Chest: Effort normal and breath sounds normal. No respiratory distress. She has no decreased breath sounds. She has no rhonchi.   Abdominal: Normal appearance.   Musculoskeletal: Normal range of motion.         General: No deformity. Normal range of motion.   Neurological: She is alert and oriented to person, place, and time. She exhibits normal muscle tone. Coordination normal.   Skin: Skin is warm, dry, intact, not diaphoretic and not pale.   Psychiatric: Her speech is normal and behavior is normal. Judgment and thought content normal.   Nursing note and vitals reviewed.      Assessment:       1. Upper respiratory tract infection, unspecified type    2. Cough, unspecified type          Plan:         Upper respiratory tract infection, unspecified type  -     dexAMETHasone injection 10 mg    Cough, unspecified type  -     SARS Coronavirus 2 Antigen, POCT Manual Read  -     POCT Influenza A/B MOLECULAR       Patient Instructions   If your condition worsens we recommend that you receive another evaluation at the emergency room  immediately or contact your primary medical clinics after hours call service to discuss your concerns. You must understand that you've received an Urgent Care treatment only and that you may be released before all of your medical problems are known or treated. You, the patient, will arrange for follow up care as instructed.  Drink plenty of Fluids  Wash hands frequently using mild antibacterial soap lathering for at least 15 seconds then rinse  Get plenty of Rest  Salt water gargles  Follow up in 1-2 weeks with Primary Care physician if not significantly better.   If you are not allergic please Tylenol every 4-6 hours as needed and/or Ibuprofen every 6-8 hours as needed, over the counter for pain or fever.  Take OTC Cough/Congestion medicine as needed. Talk to your pharmacist about the best option for you.    My notes were dictated with M*Modal Fluency Software. Any misspellings or nonsensical grammar should be attributed to its use and allowances made for errors and typographic syntactical error(s).

## 2023-01-06 ENCOUNTER — PATIENT MESSAGE (OUTPATIENT)
Dept: FAMILY MEDICINE | Facility: CLINIC | Age: 53
End: 2023-01-06
Payer: COMMERCIAL

## 2023-01-06 DIAGNOSIS — R00.2 HEART PALPITATIONS: ICD-10-CM

## 2023-01-09 RX ORDER — METOPROLOL SUCCINATE 25 MG/1
25 TABLET, EXTENDED RELEASE ORAL NIGHTLY
Qty: 90 TABLET | Refills: 1 | Status: SHIPPED | OUTPATIENT
Start: 2023-01-09 | End: 2023-02-27 | Stop reason: SDUPTHER

## 2023-01-09 NOTE — TELEPHONE ENCOUNTER
Care Due:                  Date            Visit Type   Department     Provider  --------------------------------------------------------------------------------                                EP -                              PRIMARY      [facility] FAMILY  Last Visit: 08-      CARE ([facility])   JIMMY Wasserman                               -                              [facility]  Next Visit: 02-      CARE ([facility])   MEDICINE       Jono Wasserman                                                            Last  Test          Frequency    Reason                     Performed    Due Date  --------------------------------------------------------------------------------    HBA1C.......  6 months...  semaglutide..............  06- 12-    [facility] Embedded Care Gaps. Reference number: [id number]. 1/09/2023   9:40:19 AM CST

## 2023-01-31 ENCOUNTER — OFFICE VISIT (OUTPATIENT)
Dept: URGENT CARE | Facility: CLINIC | Age: 53
End: 2023-01-31
Payer: COMMERCIAL

## 2023-01-31 VITALS
SYSTOLIC BLOOD PRESSURE: 149 MMHG | HEART RATE: 74 BPM | BODY MASS INDEX: 34.99 KG/M2 | OXYGEN SATURATION: 99 % | WEIGHT: 210 LBS | TEMPERATURE: 98 F | RESPIRATION RATE: 18 BRPM | HEIGHT: 65 IN | DIASTOLIC BLOOD PRESSURE: 94 MMHG

## 2023-01-31 DIAGNOSIS — L30.9 DERMATITIS: Primary | ICD-10-CM

## 2023-01-31 PROCEDURE — 99213 PR OFFICE/OUTPT VISIT, EST, LEVL III, 20-29 MIN: ICD-10-PCS | Mod: S$GLB,,, | Performed by: PHYSICIAN ASSISTANT

## 2023-01-31 PROCEDURE — 3077F SYST BP >= 140 MM HG: CPT | Mod: CPTII,S$GLB,, | Performed by: PHYSICIAN ASSISTANT

## 2023-01-31 PROCEDURE — 1159F PR MEDICATION LIST DOCUMENTED IN MEDICAL RECORD: ICD-10-PCS | Mod: CPTII,S$GLB,, | Performed by: PHYSICIAN ASSISTANT

## 2023-01-31 PROCEDURE — 99213 OFFICE O/P EST LOW 20 MIN: CPT | Mod: S$GLB,,, | Performed by: PHYSICIAN ASSISTANT

## 2023-01-31 PROCEDURE — 3080F DIAST BP >= 90 MM HG: CPT | Mod: CPTII,S$GLB,, | Performed by: PHYSICIAN ASSISTANT

## 2023-01-31 PROCEDURE — 1159F MED LIST DOCD IN RCRD: CPT | Mod: CPTII,S$GLB,, | Performed by: PHYSICIAN ASSISTANT

## 2023-01-31 PROCEDURE — 3080F PR MOST RECENT DIASTOLIC BLOOD PRESSURE >= 90 MM HG: ICD-10-PCS | Mod: CPTII,S$GLB,, | Performed by: PHYSICIAN ASSISTANT

## 2023-01-31 PROCEDURE — 3008F BODY MASS INDEX DOCD: CPT | Mod: CPTII,S$GLB,, | Performed by: PHYSICIAN ASSISTANT

## 2023-01-31 PROCEDURE — 1160F RVW MEDS BY RX/DR IN RCRD: CPT | Mod: CPTII,S$GLB,, | Performed by: PHYSICIAN ASSISTANT

## 2023-01-31 PROCEDURE — 1160F PR REVIEW ALL MEDS BY PRESCRIBER/CLIN PHARMACIST DOCUMENTED: ICD-10-PCS | Mod: CPTII,S$GLB,, | Performed by: PHYSICIAN ASSISTANT

## 2023-01-31 PROCEDURE — 3008F PR BODY MASS INDEX (BMI) DOCUMENTED: ICD-10-PCS | Mod: CPTII,S$GLB,, | Performed by: PHYSICIAN ASSISTANT

## 2023-01-31 PROCEDURE — 3077F PR MOST RECENT SYSTOLIC BLOOD PRESSURE >= 140 MM HG: ICD-10-PCS | Mod: CPTII,S$GLB,, | Performed by: PHYSICIAN ASSISTANT

## 2023-01-31 RX ORDER — CIPROFLOXACIN HYDROCHLORIDE 3 MG/ML
1 SOLUTION/ DROPS OPHTHALMIC EVERY 4 HOURS
Qty: 2.5 ML | Refills: 0 | Status: CANCELLED | OUTPATIENT
Start: 2023-01-31 | End: 2023-02-07

## 2023-01-31 RX ORDER — PREDNISONE 10 MG/1
TABLET ORAL
Qty: 11 TABLET | Refills: 0 | Status: SHIPPED | OUTPATIENT
Start: 2023-01-31 | End: 2023-02-27

## 2023-01-31 RX ORDER — ERYTHROMYCIN 5 MG/G
OINTMENT OPHTHALMIC 3 TIMES DAILY
Qty: 3.5 G | Refills: 0 | Status: SHIPPED | OUTPATIENT
Start: 2023-01-31 | End: 2023-02-07

## 2023-01-31 RX ORDER — KETOROLAC TROMETHAMINE 5 MG/ML
1 SOLUTION OPHTHALMIC 4 TIMES DAILY
Qty: 3 ML | Refills: 0 | Status: CANCELLED | OUTPATIENT
Start: 2023-01-31 | End: 2023-02-07

## 2023-01-31 NOTE — PROGRESS NOTES
"Subjective:       Patient ID: Flor Feliz is a 52 y.o. female.    Vitals:  height is 5' 5" (1.651 m) and weight is 95.3 kg (210 lb). Her temperature is 97.8 °F (36.6 °C). Her blood pressure is 149/94 (abnormal) and her pulse is 74. Her respiration is 18 and oxygen saturation is 99%.     Chief Complaint: Eye Problem    Patient presents to clinic today with al eft eye that is itchy, swelling at top of eyelid and sometimes beneath the eye. Symptoms have been 8-9 days. Pt applied cool/warm compresses, used hypoallergenic wash and used cleansing eye pads. Pt states no vision issues.    Eye Problem   The left eye is affected. This is a new problem. The current episode started 1 to 4 weeks ago. The problem occurs constantly. The problem has been gradually worsening. There was no injury mechanism. The pain is at a severity of 0/10. The patient is experiencing no pain. There is No known exposure to pink eye. She Does not wear contacts. Pertinent negatives include no blurred vision, eye discharge or double vision. She has tried commercial eye wash and water for the symptoms. The treatment provided mild relief.     Eyes:  Positive for eyelid swelling (and pruritis). Negative for eye trauma, eye discharge, vision loss, double vision and blurred vision.     Objective:      Physical Exam   Constitutional: She does not appear ill. No distress.   HENT:   Head: Normocephalic and atraumatic.   Ears:   Right Ear: External ear normal.   Left Ear: External ear normal.   Eyes: Conjunctivae are normal. Pupils are equal, round, and reactive to light. Right eye exhibits no discharge. Left eye exhibits no discharge. Extraocular movement intact      Comments: Small patch of mild erythema with slight scaling left upper medial eyelid   Cardiovascular: Normal rate, regular rhythm and normal heart sounds.   No murmur heard.  Pulmonary/Chest: Effort normal. No respiratory distress.   Abdominal: Normal appearance.   Musculoskeletal: Normal " range of motion.         General: Normal range of motion.   Neurological: no focal deficit. She is alert.   Skin: Skin is warm, dry and not pale. jaundice  Psychiatric: Her behavior is normal. Mood, judgment and thought content normal.   Nursing note and vitals reviewed.      Assessment:       1. Dermatitis          Plan:         Dermatitis    Other orders  -     predniSONE (DELTASONE) 10 MG tablet; Take 40mg for 1 days, take 30mg for 1 days, take 20mg for 1 days, take 10mg for 2 days  Dispense: 11 tablet; Refill: 0  -     erythromycin (ROMYCIN) ophthalmic ointment; Place into the left eye 3 (three) times daily. for 7 days  Dispense: 3.5 g; Refill: 0      Follow-up with Dermatology if symptoms do not improve    Patient Instructions   You must understand that you've received an Urgent Care treatment only and that you may be released before all your medical problems are known or treated. You, the patient, will arrange for follow up care as instructed.  Follow up with your PCP or specialty clinic as directed in the next 1-2 weeks if not improved or as needed.  You can call [phone] to schedule an appointment with the appropriate provider.  If your condition worsens we recommend that you receive another evaluation at the emergency room immediately or contact your primary medical clinics after hours call service to discuss your concerns.  Please return here or go to the Emergency Department for any concerns or worsening of condition.

## 2023-01-31 NOTE — PATIENT INSTRUCTIONS

## 2023-02-07 ENCOUNTER — CLINICAL SUPPORT (OUTPATIENT)
Dept: URGENT CARE | Facility: CLINIC | Age: 53
End: 2023-02-07
Payer: COMMERCIAL

## 2023-02-07 VITALS
TEMPERATURE: 99 F | OXYGEN SATURATION: 97 % | DIASTOLIC BLOOD PRESSURE: 91 MMHG | WEIGHT: 210 LBS | SYSTOLIC BLOOD PRESSURE: 138 MMHG | HEART RATE: 78 BPM | RESPIRATION RATE: 16 BRPM | HEIGHT: 65 IN | BODY MASS INDEX: 34.99 KG/M2

## 2023-02-07 DIAGNOSIS — U07.1 COVID-19 VIRUS DETECTED: ICD-10-CM

## 2023-02-07 DIAGNOSIS — U07.1 COVID-19: Primary | ICD-10-CM

## 2023-02-07 DIAGNOSIS — R05.9 COUGH, UNSPECIFIED TYPE: ICD-10-CM

## 2023-02-07 LAB
CTP QC/QA: YES
SARS-COV-2 AG RESP QL IA.RAPID: POSITIVE

## 2023-02-07 PROCEDURE — 87811 SARS CORONAVIRUS 2 ANTIGEN POCT, MANUAL READ: ICD-10-PCS | Mod: QW,S$GLB,, | Performed by: PHYSICIAN ASSISTANT

## 2023-02-07 PROCEDURE — 99213 OFFICE O/P EST LOW 20 MIN: CPT | Mod: S$GLB,,, | Performed by: PHYSICIAN ASSISTANT

## 2023-02-07 PROCEDURE — 87811 SARS-COV-2 COVID19 W/OPTIC: CPT | Mod: QW,S$GLB,, | Performed by: PHYSICIAN ASSISTANT

## 2023-02-07 PROCEDURE — 99213 PR OFFICE/OUTPT VISIT, EST, LEVL III, 20-29 MIN: ICD-10-PCS | Mod: S$GLB,,, | Performed by: PHYSICIAN ASSISTANT

## 2023-02-07 NOTE — PROGRESS NOTES
"Subjective:       Patient ID: Flor Feliz is a 52 y.o. female.    Vitals:  height is 5' 5" (1.651 m) and weight is 95.3 kg (210 lb). Her oral temperature is 98.5 °F (36.9 °C). Her blood pressure is 138/91 (abnormal) and her pulse is 78. Her respiration is 16 and oxygen saturation is 97%.     Chief Complaint: Cough and Nasal Congestion    Patient presents today with c/o cough and congestion X's 1 week and  sinus burning x's 3 days. Pt states she has taken OTC meds (flonase and Mucinex D) for symptoms with little relief. Pt has no known exposures. Pt is covid vaccinated.    Cough  This is a new problem. The current episode started in the past 7 days. The problem has been unchanged. The problem occurs constantly. The cough is Productive of sputum. Associated symptoms include headaches, nasal congestion, postnasal drip, a sore throat and sweats. Pertinent negatives include no chills, fever or shortness of breath. She has tried OTC cough suppressant for the symptoms. The treatment provided no relief.     Constitution: Negative for chills and fever.   HENT:  Positive for postnasal drip and sore throat.    Respiratory:  Positive for cough. Negative for shortness of breath.    Neurological:  Positive for headaches.     Objective:      Physical Exam   Constitutional: She does not appear ill. No distress.   HENT:   Head: Normocephalic and atraumatic.   Ears:   Right Ear: External ear normal.   Left Ear: External ear normal.   Nose: Right sinus exhibits no maxillary sinus tenderness and no frontal sinus tenderness. Left sinus exhibits no maxillary sinus tenderness and no frontal sinus tenderness.   Mouth/Throat: Mucous membranes are moist. No oropharyngeal exudate or posterior oropharyngeal erythema. Oropharynx is clear.   Eyes: Conjunctivae are normal. Right eye exhibits no discharge. Left eye exhibits no discharge. Extraocular movement intact   Cardiovascular: Normal rate, regular rhythm and normal heart sounds.   No " murmur heard.  Pulmonary/Chest: Effort normal and breath sounds normal. She has no wheezes. She has no rhonchi. She has no rales.   Abdominal: Normal appearance.   Musculoskeletal: Normal range of motion.         General: Normal range of motion.   Neurological: no focal deficit. She is alert.   Skin: Skin is warm, dry and not pale. jaundice  Psychiatric: Her behavior is normal. Mood, judgment and thought content normal.   Nursing note and vitals reviewed.      Assessment:       1. COVID-19    2. Cough, unspecified type          Plan:         COVID-19    Cough, unspecified type  -     SARS Coronavirus 2 Antigen, POCT Manual Read      Results for orders placed or performed in visit on 02/07/23   SARS Coronavirus 2 Antigen, POCT Manual Read   Result Value Ref Range    SARS Coronavirus 2 Antigen Positive (A) Negative     Acceptable Yes          Pocket script azithromycin Z-Shawn given to patient in case symptoms fail to improve or worsen. Pt advised on risk of taking medication too soon and verbalized understanding.    Explained r/b and patient v/u to fill only if symptoms progress or worsen after maximizing home remedies sheet given and/or explained during encounter.         Patient Instructions   Your test was POSITIVE for COVID-19 (coronavirus).       Please isolate yourself at home.  You may leave home and/or return to work once the following conditions are met:    If you were not hospitalized and are not moderately to severely immunocompromised:   More than 5 days since symptoms first appeared AND  More than 24 hours fever free without medications AND  Symptoms are improving  Continue to wear a mask around others for 5 additional days.    If you were hospitalized OR are moderately to severely immunocompromised:  More than 20 days since symptoms first appeared  More than 24 hours fever free without medications  Symptoms have improved    If you had no symptoms but tested positive:  More than 5 days since  the date of the first positive test (20 days if moderately to severely immunocompromised). If you develop symptoms, then use the guidelines above.  Continue to wear a mask around others for 5 additional days.      Contact Tracing    As one of the next steps, you will receive a call or text from the [place] Department of Health ([facility]) COVID-19 Tracing Team. See the contact information below so you know not to ignore the health departments call. It is important that you contact them back immediately so they can help.      Contact Tracer Number:  [phone]  Caller ID for most carriers: [facility]     What is contact tracing?  Contact tracing is a process that helps identify everyone who has been in close contact with an infected person. Contact tracers let those people know they may have been exposed and guide them on next steps. Confidentiality is important for everyone; no one will be told who may have exposed them to the virus.  Your involvement is important. The more we know about where and how this virus is spreading, the better chance we have at stopping it from spreading further.  What does exposure mean?  Exposure means you have been within 6 feet for more than 15 minutes with a person who has or had COVID-19.  What kind of questions do the contact tracers ask?  A contact tracer will confirm your basic contact information including name, address, phone number, and next of kin, as well as asking about any symptoms you may have had. Theyll also ask you how you think you may have gotten sick, such as places where you may have been exposed to the virus, and people you were with. Those names will never be shared with anyone outside of that call, and will only be used to help trace and stop the spread of the virus.   I have privacy concerns. How will the state use my information?  Your privacy about your health is important. All calls are completed using call centers that use the appropriate health  privacy protection measures (HIPAA compliance), meaning that your patient information is safe. No one will ever ask you any questions related to immigration status. Your health comes first.   Do I have to participate?  You do not have to participate, but we strongly encourage you to. Contact tracing can help us catch and control new outbreaks as theyre developing to keep your friends and family safe.   What if I dont hear from anyone?  If you dont receive a call within 24 hours, you can call the number above right away to inquire about your status. That line is open from 8:00 am - 8:00 p.m., 7 days a week.  Contact tracing saves lives! Together, we have the power to beat this virus and keep our loved ones and neighbors safe.    For more information see CDC link below.      https://www.cdc.gov/coronavirus/2019-ncov/hcp/guidance-prevent-spread.html#precautions        Sources:  [facility], [place] [facility] and [facility]           Sincerely,     ISIAH Motta

## 2023-02-07 NOTE — PATIENT INSTRUCTIONS
Your test was POSITIVE for COVID-19 (coronavirus).       Please isolate yourself at home.  You may leave home and/or return to work once the following conditions are met:    If you were not hospitalized and are not moderately to severely immunocompromised:   More than 5 days since symptoms first appeared AND  More than 24 hours fever free without medications AND  Symptoms are improving  Continue to wear a mask around others for 5 additional days.    If you were hospitalized OR are moderately to severely immunocompromised:  More than 20 days since symptoms first appeared  More than 24 hours fever free without medications  Symptoms have improved    If you had no symptoms but tested positive:  More than 5 days since the date of the first positive test (20 days if moderately to severely immunocompromised). If you develop symptoms, then use the guidelines above.  Continue to wear a mask around others for 5 additional days.      Contact Tracing    As one of the next steps, you will receive a call or text from the [place] [facility] ([facility]) COVID-19 Tracing Team. See the contact information below so you know not to ignore the health departments call. It is important that you contact them back immediately so they can help.      Contact Tracer Number:  [phone]  Caller ID for most carriers: [facility]     What is contact tracing?  Contact tracing is a process that helps identify everyone who has been in close contact with an infected person. Contact tracers let those people know they may have been exposed and guide them on next steps. Confidentiality is important for everyone; no one will be told who may have exposed them to the virus.  Your involvement is important. The more we know about where and how this virus is spreading, the better chance we have at stopping it from spreading further.  What does exposure mean?  Exposure means you have been within 6 feet for more than 15 minutes with a person who  has or had COVID-19.  What kind of questions do the contact tracers ask?  A contact tracer will confirm your basic contact information including name, address, phone number, and next of kin, as well as asking about any symptoms you may have had. Theyll also ask you how you think you may have gotten sick, such as places where you may have been exposed to the virus, and people you were with. Those names will never be shared with anyone outside of that call, and will only be used to help trace and stop the spread of the virus.   I have privacy concerns. How will the state use my information?  Your privacy about your health is important. All calls are completed using call centers that use the appropriate health privacy protection measures (HIPAA compliance), meaning that your patient information is safe. No one will ever ask you any questions related to immigration status. Your health comes first.   Do I have to participate?  You do not have to participate, but we strongly encourage you to. Contact tracing can help us catch and control new outbreaks as theyre developing to keep your friends and family safe.   What if I dont hear from anyone?  If you dont receive a call within 24 hours, you can call the number above right away to inquire about your status. That line is open from 8:00 am - 8:00 p.m., 7 days a week.  Contact tracing saves lives! Together, we have the power to beat this virus and keep our loved ones and neighbors safe.    For more information see CDC link below.      https://www.cdc.gov/coronavirus/2019-ncov/hcp/guidance-prevent-spread.html#precautions        Sources:  [facility], [place] [facility] and [facility]           Sincerely,     ISIAH Motta

## 2023-02-07 NOTE — LETTER
[address] [address], [address] ? [place] [address] ? Phone [phone] ? Fax [phone]           Return to Work/School    Patient: Flor Feliz  YOB: 1970   Date: 02/07/2023      To Whom It May Concern:     Flor Feliz was in contact with/seen in my office on 02/07/2023. COVID-19 is present in our communities across the [facility]. Not all patients are eligible or appropriate to be tested. In this situation, your employee meets the following criteria:     Flor Feliz has met the criteria for COVID-19 testing and has a POSITIVE result. She can return to work once they are asymptomatic for 24 hours without the use of fever reducing medications AND at least five days from the start of symptoms (or from the first positive result if they have no symptoms).      If you have any questions or concerns, or if I can be of further assistance, please do not hesitate to contact me.     Sincerely,    ISIAH Motta

## 2023-02-27 ENCOUNTER — OFFICE VISIT (OUTPATIENT)
Dept: FAMILY MEDICINE | Facility: CLINIC | Age: 53
End: 2023-02-27
Payer: COMMERCIAL

## 2023-02-27 VITALS
HEIGHT: 65 IN | DIASTOLIC BLOOD PRESSURE: 74 MMHG | BODY MASS INDEX: 38 KG/M2 | OXYGEN SATURATION: 96 % | WEIGHT: 228.06 LBS | SYSTOLIC BLOOD PRESSURE: 120 MMHG | RESPIRATION RATE: 16 BRPM | HEART RATE: 86 BPM

## 2023-02-27 DIAGNOSIS — Z11.59 NEED FOR HEPATITIS C SCREENING TEST: ICD-10-CM

## 2023-02-27 DIAGNOSIS — I08.1 DISORDERS OF BOTH MITRAL AND TRICUSPID VALVES: ICD-10-CM

## 2023-02-27 DIAGNOSIS — Z01.89 ENCOUNTER FOR ROUTINE LABORATORY TESTING: ICD-10-CM

## 2023-02-27 DIAGNOSIS — R00.2 HEART PALPITATIONS: Primary | ICD-10-CM

## 2023-02-27 DIAGNOSIS — E88.810 METABOLIC SYNDROME: ICD-10-CM

## 2023-02-27 DIAGNOSIS — J30.89 ENVIRONMENTAL AND SEASONAL ALLERGIES: ICD-10-CM

## 2023-02-27 DIAGNOSIS — E78.5 HYPERLIPIDEMIA, UNSPECIFIED HYPERLIPIDEMIA TYPE: ICD-10-CM

## 2023-02-27 DIAGNOSIS — R73.9 BLOOD GLUCOSE ELEVATED: ICD-10-CM

## 2023-02-27 DIAGNOSIS — R09.81 SINUS CONGESTION: ICD-10-CM

## 2023-02-27 DIAGNOSIS — E66.01 SEVERE OBESITY (BMI 35.0-39.9) WITH COMORBIDITY: ICD-10-CM

## 2023-02-27 DIAGNOSIS — I10 ESSENTIAL HYPERTENSION: ICD-10-CM

## 2023-02-27 DIAGNOSIS — E66.9 OBESITY (BMI 30-39.9): ICD-10-CM

## 2023-02-27 PROCEDURE — 3078F PR MOST RECENT DIASTOLIC BLOOD PRESSURE < 80 MM HG: ICD-10-PCS | Mod: CPTII,S$GLB,, | Performed by: INTERNAL MEDICINE

## 2023-02-27 PROCEDURE — 99999 PR PBB SHADOW E&M-EST. PATIENT-LVL IV: CPT | Mod: PBBFAC,,, | Performed by: INTERNAL MEDICINE

## 2023-02-27 PROCEDURE — 4010F ACE/ARB THERAPY RXD/TAKEN: CPT | Mod: CPTII,S$GLB,, | Performed by: INTERNAL MEDICINE

## 2023-02-27 PROCEDURE — 1159F MED LIST DOCD IN RCRD: CPT | Mod: CPTII,S$GLB,, | Performed by: INTERNAL MEDICINE

## 2023-02-27 PROCEDURE — 1160F PR REVIEW ALL MEDS BY PRESCRIBER/CLIN PHARMACIST DOCUMENTED: ICD-10-PCS | Mod: CPTII,S$GLB,, | Performed by: INTERNAL MEDICINE

## 2023-02-27 PROCEDURE — 3074F PR MOST RECENT SYSTOLIC BLOOD PRESSURE < 130 MM HG: ICD-10-PCS | Mod: CPTII,S$GLB,, | Performed by: INTERNAL MEDICINE

## 2023-02-27 PROCEDURE — 1159F PR MEDICATION LIST DOCUMENTED IN MEDICAL RECORD: ICD-10-PCS | Mod: CPTII,S$GLB,, | Performed by: INTERNAL MEDICINE

## 2023-02-27 PROCEDURE — 99214 OFFICE O/P EST MOD 30 MIN: CPT | Mod: S$GLB,,, | Performed by: INTERNAL MEDICINE

## 2023-02-27 PROCEDURE — 4010F PR ACE/ARB THEARPY RXD/TAKEN: ICD-10-PCS | Mod: CPTII,S$GLB,, | Performed by: INTERNAL MEDICINE

## 2023-02-27 PROCEDURE — 99214 PR OFFICE/OUTPT VISIT, EST, LEVL IV, 30-39 MIN: ICD-10-PCS | Mod: S$GLB,,, | Performed by: INTERNAL MEDICINE

## 2023-02-27 PROCEDURE — 3074F SYST BP LT 130 MM HG: CPT | Mod: CPTII,S$GLB,, | Performed by: INTERNAL MEDICINE

## 2023-02-27 PROCEDURE — 99999 PR PBB SHADOW E&M-EST. PATIENT-LVL IV: ICD-10-PCS | Mod: PBBFAC,,, | Performed by: INTERNAL MEDICINE

## 2023-02-27 PROCEDURE — 3008F BODY MASS INDEX DOCD: CPT | Mod: CPTII,S$GLB,, | Performed by: INTERNAL MEDICINE

## 2023-02-27 PROCEDURE — 3078F DIAST BP <80 MM HG: CPT | Mod: CPTII,S$GLB,, | Performed by: INTERNAL MEDICINE

## 2023-02-27 PROCEDURE — 1160F RVW MEDS BY RX/DR IN RCRD: CPT | Mod: CPTII,S$GLB,, | Performed by: INTERNAL MEDICINE

## 2023-02-27 PROCEDURE — 3008F PR BODY MASS INDEX (BMI) DOCUMENTED: ICD-10-PCS | Mod: CPTII,S$GLB,, | Performed by: INTERNAL MEDICINE

## 2023-02-27 RX ORDER — FLUTICASONE PROPIONATE 50 MCG
1 SPRAY, SUSPENSION (ML) NASAL 2 TIMES DAILY PRN
Qty: 16 G | Refills: 6 | Status: SHIPPED | OUTPATIENT
Start: 2023-02-27

## 2023-02-27 RX ORDER — SEMAGLUTIDE 1.34 MG/ML
1 INJECTION, SOLUTION SUBCUTANEOUS
Qty: 1 PEN | Refills: 3 | Status: SHIPPED | OUTPATIENT
Start: 2023-02-27 | End: 2024-02-27

## 2023-02-27 RX ORDER — METOPROLOL SUCCINATE 25 MG/1
25 TABLET, EXTENDED RELEASE ORAL NIGHTLY
Qty: 90 TABLET | Refills: 1 | Status: CANCELLED | OUTPATIENT
Start: 2023-02-27 | End: 2023-03-09

## 2023-02-27 RX ORDER — LISINOPRIL 20 MG/1
20 TABLET ORAL 2 TIMES DAILY
Qty: 180 TABLET | Refills: 1 | Status: CANCELLED | OUTPATIENT
Start: 2023-02-27

## 2023-02-27 RX ORDER — PROGESTERONE 200 MG/1
CAPSULE ORAL
Status: CANCELLED | OUTPATIENT
Start: 2023-02-27

## 2023-02-27 RX ORDER — LISINOPRIL 20 MG/1
20 TABLET ORAL 2 TIMES DAILY
Qty: 180 TABLET | Refills: 1 | Status: SHIPPED | OUTPATIENT
Start: 2023-02-27 | End: 2023-11-15 | Stop reason: SDUPTHER

## 2023-02-27 RX ORDER — HYDROCHLOROTHIAZIDE 12.5 MG/1
CAPSULE ORAL
Qty: 90 CAPSULE | Refills: 2 | Status: SHIPPED | OUTPATIENT
Start: 2023-02-27 | End: 2024-01-23

## 2023-02-27 RX ORDER — METOPROLOL SUCCINATE 25 MG/1
25 TABLET, EXTENDED RELEASE ORAL NIGHTLY
Qty: 90 TABLET | Refills: 1 | Status: SHIPPED | OUTPATIENT
Start: 2023-02-27 | End: 2023-06-27 | Stop reason: SDUPTHER

## 2023-02-27 RX ORDER — SEMAGLUTIDE 1.34 MG/ML
INJECTION, SOLUTION SUBCUTANEOUS
Qty: 2 PEN | Refills: 2 | Status: CANCELLED | OUTPATIENT
Start: 2023-02-27

## 2023-02-27 RX ORDER — HYDROCHLOROTHIAZIDE 12.5 MG/1
CAPSULE ORAL
Qty: 90 CAPSULE | Refills: 2 | Status: CANCELLED | OUTPATIENT
Start: 2023-02-27

## 2023-02-27 NOTE — PROGRESS NOTES
Subjective:       Patient ID: Flor Feliz is a 52 y.o. female.    Chief Complaint: Follow-up (Medication refills needed)   HPI    Gyn: 5/2022  Dr De La O   MMG:  3/2022  Colonoscopy:  2019 Dr. Ratliff r/c 10 years  Immunizations: Flu:  Tdap: 6/28/21  Shingles: recommend Covid: yes   Smoker:  No      Covid early Feb 2023 - seen at urgent care. S/s resolved      Metabolic syndrome BMI 37 & 228. Rx Ozempic not losing weight, Qsymia 11.25 -- increased dose 15 easily irritability reduced down and resolved.       Heart Murmur: hx A fib episode years ago seen at  Dr Arrieta approx 2006.    Transthoracic echo (TTE) complete  Order# [id number]  Reading physician: Manjinder Carey Jr., MD Ordering physician: Jono Wasserman MD Study date: 9/16/22   The left ventricle is normal in size with concentric remodeling and normal systolic function.  The estimated ejection fraction is 60%.  Normal left ventricular diastolic function.  Normal right ventricular size with normal right ventricular systolic function.  The aortic valve appears structurally normal. There is normal leaflet mobility  Mild-to-moderate mitral regurgitation.  Mild tricuspid regurgitation.  Normal central venous pressure (3 mmHg).  The estimated PA systolic pressure is 31 mmHg.    HLD:  Uncontrolled //     Hypertension:  Controlled:   Rx : lisinopril 20 BID, Metoprolol 25    Vitamin-D deficiency:  Gyn placed prescription dose will start over the counter after completed  Allergies controlled  ____________________________________________________________________________________________________  Assessment & Plan:  1. Heart palpitations  - metoprolol succinate (TOPROL-XL) 25 MG 24 hr tablet; Take 1 tablet (25 mg total) by mouth every evening. for 10 days  Dispense: 90 tablet; Refill: 1    2. 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 Hyperlipidemia, unspecified hyperlipidemia type  - Comprehensive Metabolic Panel; Future  - Lipid  Panel; Future  - Comprehensive Metabolic Panel  - Lipid Panel    4. Essential hypertension  - TSH; Future  - TSH  - metoprolol succinate (TOPROL-XL) 25 MG 24 hr tablet; Take 1 tablet (25 mg total) by mouth every evening. for 10 days  Dispense: 90 tablet; Refill: 1  - lisinopriL (PRINIVIL,ZESTRIL) 20 MG tablet; Take 1 tablet (20 mg total) by mouth 2 (two) times daily.  Dispense: 180 tablet; Refill: 1  - hydroCHLOROthiazide (MICROZIDE) 12.5 mg capsule; TAKE 1 CAPSULE(12.5 MG) BY MOUTH EVERY DAY  Dispense: 90 capsule; Refill: 2    5. Blood glucose elevated  - Hemoglobin A1C; Future  - Insulin, Random; Future  - Hemoglobin A1C  - Insulin, Random    6. Sinus congestion  - fluticasone propionate (FLONASE) 50 mcg/actuation nasal spray; 1 spray (50 mcg total) by Each Nostril route 2 (two) times daily as needed for Rhinitis or Allergies.  Dispense: 16 g; Refill: 6    7. Metabolic syndrome  - phentermine-topiramate (QSYMIA) 11.25-69 mg CM24; Take 1 capsule by mouth once daily.  Dispense: 30 capsule; Refill: 4    8. Environmental and seasonal allergies  - fluticasone propionate (FLONASE) 50 mcg/actuation nasal spray; 1 spray (50 mcg total) by Each Nostril route 2 (two) times daily as needed for Rhinitis or Allergies.  Dispense: 16 g; Refill: 6    9. Severe obesity (BMI 35.0-39.9) with comorbidity    10. Need for hepatitis C screening test  - Hepatitis C Antibody; Future  - Hepatitis C Antibody    11. Obesity (BMI 30-39.9)  - phentermine-topiramate (QSYMIA) 11.25-69 mg CM24; Take 1 capsule by mouth once daily.  Dispense: 30 capsule; Refill: 4    12. Encounter for routine laboratory testing  - CBC Without Differential; Future  - Comprehensive Metabolic Panel; Future  - Hemoglobin A1C; Future  - Lipid Panel; Future  - Insulin, Random; Future  - TSH; Future  - Rubella Antibody, IgG; Future  - Mumps, IgG Screen; Future  - Rubeola Antibody IgG; Future  - Hepatitis B Surface Ab, Qualitative; Future  - Hepatitis C Antibody; Future  -  POLIOVIRUS ANTIBODIES, TYPES 1, 2, AND 3; Future  - VARICELLA ZOSTER ANTIBODY, IGG; Future  - CBC Without Differential  - Comprehensive Metabolic Panel  - Hemoglobin A1C  - Lipid Panel  - Insulin, Random  - TSH  - Rubella Antibody, IgG  - Mumps, IgG Screen  - Rubeola Antibody IgG  - Hepatitis B Surface Ab, Qualitative  - Hepatitis C Antibody  - POLIOVIRUS ANTIBODIES, TYPES 1, 2, AND 3  - VARICELLA ZOSTER ANTIBODY, IGG     Heart palpitations  -     metoprolol succinate (TOPROL-XL) 25 MG 24 hr tablet; Take 1 tablet (25 mg total) by mouth every evening. for 10 days  Dispense: 90 tablet; Refill: 1    Disorders of both mitral and tricuspid valves  Comments:  echo 2022     Hyperlipidemia, unspecified hyperlipidemia type  -     Comprehensive Metabolic Panel; Future; Expected date: 02/27/2023  -     Lipid Panel; Future; Expected date: 02/27/2023    Essential hypertension  -     TSH; Future; Expected date: 02/27/2023  -     metoprolol succinate (TOPROL-XL) 25 MG 24 hr tablet; Take 1 tablet (25 mg total) by mouth every evening. for 10 days  Dispense: 90 tablet; Refill: 1  -     lisinopriL (PRINIVIL,ZESTRIL) 20 MG tablet; Take 1 tablet (20 mg total) by mouth 2 (two) times daily.  Dispense: 180 tablet; Refill: 1  -     hydroCHLOROthiazide (MICROZIDE) 12.5 mg capsule; TAKE 1 CAPSULE(12.5 MG) BY MOUTH EVERY DAY  Dispense: 90 capsule; Refill: 2    Blood glucose elevated  -     Hemoglobin A1C; Future; Expected date: 02/27/2023  -     Insulin, Random; Future; Expected date: 02/27/2023    Sinus congestion  -     fluticasone propionate (FLONASE) 50 mcg/actuation nasal spray; 1 spray (50 mcg total) by Each Nostril route 2 (two) times daily as needed for Rhinitis or Allergies.  Dispense: 16 g; Refill: 6    Metabolic syndrome  -     phentermine-topiramate (QSYMIA) 11.25-69 mg CM24; Take 1 capsule by mouth once daily.  Dispense: 30 capsule; Refill: 4    Environmental and seasonal allergies  -     fluticasone propionate (FLONASE) 50  mcg/actuation nasal spray; 1 spray (50 mcg total) by Each Nostril route 2 (two) times daily as needed for Rhinitis or Allergies.  Dispense: 16 g; Refill: 6    Severe obesity (BMI 35.0-39.9) with comorbidity    Need for hepatitis C screening test  -     Hepatitis C Antibody; Future; Expected date: 02/27/2023    Obesity (BMI 30-39.9)  -     phentermine-topiramate (QSYMIA) 11.25-69 mg CM24; Take 1 capsule by mouth once daily.  Dispense: 30 capsule; Refill: 4    Encounter for routine laboratory testing  -     CBC Without Differential; Future; Expected date: 02/27/2023  -     Comprehensive Metabolic Panel; Future; Expected date: 02/27/2023  -     Hemoglobin A1C; Future; Expected date: 02/27/2023  -     Lipid Panel; Future; Expected date: 02/27/2023  -     Insulin, Random; Future; Expected date: 02/27/2023  -     TSH; Future; Expected date: 02/27/2023  -     Rubella Antibody, IgG; Future; Expected date: 02/27/2023  -     Mumps, IgG Screen; Future; Expected date: 02/27/2023  -     Rubeola Antibody IgG; Future; Expected date: 02/27/2023  -     Hepatitis B Surface Ab, Qualitative; Future; Expected date: 02/27/2023  -     Hepatitis C Antibody; Future; Expected date: 02/27/2023  -     POLIOVIRUS ANTIBODIES, TYPES 1, 2, AND 3; Future; Expected date: 02/27/2023  -     VARICELLA ZOSTER ANTIBODY, IGG; Future; Expected date: 02/27/2023    Other orders  -     semaglutide (OZEMPIC) 1 mg/dose (4 mg/3 mL); Inject 1 mg into the skin every 7 days.  Dispense: 1 pen; Refill: 3        Continue to work on regular exercise, maintain healthy weight, balanced diet. Avoid unhealthy habits: smoking, excessive alcohol intake.     Disclaimer: This note was partly generated using dictation software which may occasionally result in transcription errors  ____________________________________________________________________________________________________  Review of Systems:  Review of Systems   Constitutional:  Negative for appetite change.   HENT:   Negative for nosebleeds.    Eyes:  Negative for pain.   Respiratory:  Negative for choking.    Cardiovascular:  Negative for chest pain.   Gastrointestinal:  Negative for blood in stool.   Genitourinary:  Negative for hematuria.   Musculoskeletal:  Negative for joint swelling.   Skin:  Negative for pallor.   Neurological:  Negative for facial asymmetry.   Hematological:  Does not bruise/bleed easily.   Psychiatric/Behavioral:  Negative for confusion.      Objective:     Wt Readings from Last 3 Encounters:   02/27/23 103.4 kg (228 lb 1.1 oz)   02/07/23 95.3 kg (210 lb)   01/31/23 95.3 kg (210 lb)     BP Readings from Last 3 Encounters:   02/27/23 120/74   02/07/23 (!) 138/91   01/31/23 (!) 149/94       Lab Results   Component Value Date    WBC 10.3 07/18/2022    HGB 15.8 07/18/2022    HCT 48.9 (H) 07/18/2022     07/18/2022     07/18/2022    K 3.7 07/18/2022     07/18/2022    ALT 12 07/18/2022    AST 14 07/18/2022    CO2 27 07/18/2022    CREATININE 0.63 07/18/2022    BUN 15 07/18/2022    GLU 91 07/18/2022      Hemoglobin A1c   Date Value Ref Range Status   06/22/2021 5.5 4.8 - 5.6 % Final     Comment:              Prediabetes: 5.7 - 6.4           Diabetes: >6.4           Glycemic control for adults with diabetes: <7.0        Lab Results   Component Value Date    TSH 1.480 07/18/2022    TSH 1.080 06/22/2021     No results found for: FREET4  Lab Results   Component Value Date    LDLCALC 156 (H) 07/18/2022    LDLCALC 116 (H) 06/22/2021     Lab Results   Component Value Date    TRIG 70 07/18/2022    TRIG 67 06/22/2021            Physical Exam  Constitutional:       Appearance: Normal appearance.   HENT:      Head: Normocephalic and atraumatic.   Eyes:      Extraocular Movements: Extraocular movements intact.      Pupils: Pupils are equal, round, and reactive to light.   Cardiovascular:      Rate and Rhythm: Normal rate.   Pulmonary:      Effort: Pulmonary effort is normal.   Neurological:      Mental  Status: She is alert and oriented to person, place, and time.   Psychiatric:         Mood and Affect: Mood normal.       Medication List with Changes/Refills   New Medications    SEMAGLUTIDE (OZEMPIC) 1 MG/DOSE (4 MG/3 ML)    Inject 1 mg into the skin every 7 days.   Current Medications    CETIRIZINE 10 MG CHEWABLE TABLET    Take 10 mg by mouth once daily.    CHOLECALCIFEROL, VITAMIN D3, 1,250 MCG (50,000 UNIT) CAPSULE    Take 50,000 Units by mouth every 7 days.    ONDANSETRON (ZOFRAN-ODT) 4 MG TBDL    Take 1 tablet (4 mg total) by mouth every 6 (six) hours as needed.    PROGESTERONE (PROMETRIUM) 200 MG CAPSULE    TAKE ONE CAPSULE BY MOUTH EVERY NIGHT AT BEDTIME ON DAYS 1 THROUGH 12 PER MONTH    TESTOSTERONE AND ESTRADIOL CYP (ESTRADIOL-TESTOSTERONE IM)    Inject into the muscle. Pellets Q 3 Month - Gyn Dr De La O   Changed and/or Refilled Medications    Modified Medication Previous Medication    FLUTICASONE PROPIONATE (FLONASE) 50 MCG/ACTUATION NASAL SPRAY fluticasone propionate (FLONASE) 50 mcg/actuation nasal spray       1 spray (50 mcg total) by Each Nostril route 2 (two) times daily as needed for Rhinitis or Allergies.    1 spray (50 mcg total) by Each Nostril route 2 (two) times daily as needed for Rhinitis or Allergies.    HYDROCHLOROTHIAZIDE (MICROZIDE) 12.5 MG CAPSULE hydroCHLOROthiazide (MICROZIDE) 12.5 mg capsule       TAKE 1 CAPSULE(12.5 MG) BY MOUTH EVERY DAY    TAKE 1 CAPSULE(12.5 MG) BY MOUTH EVERY DAY    LISINOPRIL (PRINIVIL,ZESTRIL) 20 MG TABLET lisinopriL (PRINIVIL,ZESTRIL) 20 MG tablet       Take 1 tablet (20 mg total) by mouth 2 (two) times daily.    TAKE 1 TABLET(20 MG) BY MOUTH TWICE DAILY    METOPROLOL SUCCINATE (TOPROL-XL) 25 MG 24 HR TABLET metoprolol succinate (TOPROL-XL) 25 MG 24 hr tablet       Take 1 tablet (25 mg total) by mouth every evening. for 10 days    Take 1 tablet (25 mg total) by mouth every evening. ER supply forgot meds for 10 days    PHENTERMINE-TOPIRAMATE (QSYMIA) 11.25-69  MG CM24 phentermine-topiramate (QSYMIA) 11.25-69 mg CM24       Take 1 capsule by mouth once daily.    Take 1 capsule by mouth once daily.   Discontinued Medications    PREDNISONE (DELTASONE) 10 MG TABLET    Take 40mg for 1 days, take 30mg for 1 days, take 20mg for 1 days, take 10mg for 2 days    SEMAGLUTIDE (OZEMPIC) 0.25 MG OR 0.5 MG(2 MG/1.5 ML) PEN INJECTOR    Inject 0.25 mg SQ weekly x 4 wks; then increase to 0.5 mg SQ weekly

## 2023-03-14 LAB
ALBUMIN SERPL-MCNC: 4.8 G/DL (ref 3.8–4.9)
ALBUMIN/GLOB SERPL: 2 {RATIO} (ref 1.2–2.2)
ALP SERPL-CCNC: 96 IU/L (ref 44–121)
ALT SERPL-CCNC: 10 IU/L (ref 0–32)
AST SERPL-CCNC: 13 IU/L (ref 0–40)
BILIRUB SERPL-MCNC: 0.5 MG/DL (ref 0–1.2)
BUN SERPL-MCNC: 17 MG/DL (ref 6–24)
BUN/CREAT SERPL: 20 (ref 9–23)
CALCIUM SERPL-MCNC: 9.5 MG/DL (ref 8.7–10.2)
CHLORIDE SERPL-SCNC: 98 MMOL/L (ref 96–106)
CHOLEST SERPL-MCNC: 222 MG/DL (ref 100–199)
CO2 SERPL-SCNC: 22 MMOL/L (ref 20–29)
CREAT SERPL-MCNC: 0.83 MG/DL (ref 0.57–1)
ERYTHROCYTE [DISTWIDTH] IN BLOOD BY AUTOMATED COUNT: 12.7 % (ref 11.7–15.4)
EST. GFR  (NO RACE VARIABLE): 84 ML/MIN/1.73
GLOBULIN SER CALC-MCNC: 2.4 G/DL (ref 1.5–4.5)
GLUCOSE SERPL-MCNC: 72 MG/DL (ref 70–99)
HBA1C MFR BLD: 5.4 % (ref 4.8–5.6)
HBV SURFACE AB SER QL: NON REACTIVE
HCT VFR BLD AUTO: 49.6 % (ref 34–46.6)
HCV IGG SERPL QL IA: NON REACTIVE
HDLC SERPL-MCNC: 45 MG/DL
HGB BLD-MCNC: 16.6 G/DL (ref 11.1–15.9)
INSULIN SERPL-ACNC: 14.4 UIU/ML (ref 2.6–24.9)
LDLC SERPL CALC-MCNC: 164 MG/DL (ref 0–99)
MCH RBC QN AUTO: 31.3 PG (ref 26.6–33)
MCHC RBC AUTO-ENTMCNC: 33.5 G/DL (ref 31.5–35.7)
MCV RBC AUTO: 93 FL (ref 79–97)
MEV IGG SER IA-ACNC: NORMAL AU/ML
MUV IGG SER IA-ACNC: NORMAL AU/ML
PLATELET # BLD AUTO: 244 X10E3/UL (ref 150–450)
POTASSIUM SERPL-SCNC: 3.5 MMOL/L (ref 3.5–5.2)
PROT SERPL-MCNC: 7.2 G/DL (ref 6–8.5)
RBC # BLD AUTO: 5.31 X10E6/UL (ref 3.77–5.28)
RUBV IGG SERPL IA-ACNC: NORMAL INDEX
SODIUM SERPL-SCNC: 138 MMOL/L (ref 134–144)
TRIGL SERPL-MCNC: 73 MG/DL (ref 0–149)
TSH SERPL DL<=0.005 MIU/L-ACNC: 0.9 UIU/ML (ref 0.45–4.5)
VLDLC SERPL CALC-MCNC: 13 MG/DL (ref 5–40)
VZV IGG SER IA-ACNC: NORMAL INDEX
WBC # BLD AUTO: 11.9 X10E3/UL (ref 3.4–10.8)

## 2023-03-21 DIAGNOSIS — Z00.00 WELLNESS EXAMINATION: ICD-10-CM

## 2023-03-21 DIAGNOSIS — E78.5 HYPERLIPIDEMIA, UNSPECIFIED HYPERLIPIDEMIA TYPE: ICD-10-CM

## 2023-03-21 DIAGNOSIS — R79.89 ABNORMAL CBC: Primary | ICD-10-CM

## 2023-03-21 RX ORDER — ROSUVASTATIN CALCIUM 5 MG/1
5 TABLET, COATED ORAL DAILY
Qty: 90 TABLET | Refills: 1 | Status: SHIPPED | OUTPATIENT
Start: 2023-03-21 | End: 2023-10-18

## 2023-06-27 DIAGNOSIS — R00.2 HEART PALPITATIONS: ICD-10-CM

## 2023-06-27 DIAGNOSIS — I10 ESSENTIAL HYPERTENSION: ICD-10-CM

## 2023-06-28 RX ORDER — METOPROLOL SUCCINATE 25 MG/1
25 TABLET, EXTENDED RELEASE ORAL NIGHTLY
Qty: 90 TABLET | Refills: 2 | Status: SHIPPED | OUTPATIENT
Start: 2023-06-28

## 2023-06-28 NOTE — TELEPHONE ENCOUNTER
Care Due:                  Date            Visit Type   Department     Provider  --------------------------------------------------------------------------------                                EP -                              PRIMARY      [facility] FAMILY  Last Visit: 02-      CARE ([facility])   MEDICINE       Jono Wasserman  Next Visit: None Scheduled  None         None Found                                                            Last  Test          Frequency    Reason                     Performed    Due Date  --------------------------------------------------------------------------------    HBA1C.......  6 months...  semaglutide..............  03-   09-    [facility] Embedded Care Due Messages. Reference number: [id number].   6/27/2023 11:55:06 PM CDT

## 2023-06-28 NOTE — TELEPHONE ENCOUNTER
Provider Staff:  Action required for this patient     Please see care gap opportunities below in Care Due Message.    Thanks!  [facility]     Appointments      Date Provider   Last Visit   2/27/2023 Jono Wasserman MD   Next Visit   Visit date not found Jono Wasserman MD     Refill Decision Note   Flor Feliz  is requesting a refill authorization.  Brief Assessment and Rationale for Refill:  Approve     Medication Therapy Plan:         Comments:     Note composed:1:19 PM 06/28/2023

## 2023-08-02 ENCOUNTER — OFFICE VISIT (OUTPATIENT)
Dept: URGENT CARE | Facility: CLINIC | Age: 53
End: 2023-08-02
Payer: COMMERCIAL

## 2023-08-02 VITALS
WEIGHT: 228 LBS | HEIGHT: 65 IN | TEMPERATURE: 98 F | BODY MASS INDEX: 37.99 KG/M2 | RESPIRATION RATE: 18 BRPM | HEART RATE: 74 BPM | DIASTOLIC BLOOD PRESSURE: 85 MMHG | SYSTOLIC BLOOD PRESSURE: 137 MMHG | OXYGEN SATURATION: 96 %
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
## 2023-08-02 PROCEDURE — 99213 PR OFFICE/OUTPT VISIT, EST, LEVL III, 20-29 MIN: ICD-10-PCS | Mod: S$GLB,,, | Performed by: INTERNAL MEDICINE

## 2023-08-02 PROCEDURE — 99213 OFFICE O/P EST LOW 20 MIN: CPT | Mod: S$GLB,,, | Performed by: INTERNAL MEDICINE

## 2023-08-02 RX ORDER — AMOXICILLIN AND CLAVULANATE POTASSIUM 875; 125 MG/1; MG/1
1 TABLET, FILM COATED ORAL 2 TIMES DAILY
Qty: 10 TABLET | Refills: 0 | Status: SHIPPED | OUTPATIENT
Start: 2023-08-02 | End: 2023-08-07

## 2023-08-02 NOTE — PROGRESS NOTES
"Subjective:      Patient ID: Flor Feliz is a 53 y.o. female.    Vitals:  height is 5' 5" (1.651 m) and weight is 103.4 kg (228 lb). Her oral temperature is 97.7 °F (36.5 °C). Her blood pressure is 137/85 and her pulse is 74. Her respiration is 18 and oxygen saturation is 96%.     Chief Complaint: Facial Laceration    Small laceration on her nose from her dog's tooth last night   Patient has cleaned the wound with peroxide, soap and water and been putting neosporin on the area   Last tdap was in 2021    Laceration   The incident occurred 6 to 12 hours ago. The laceration is located on the Face. Injury mechanism: dog tooth. The pain is at a severity of 2/10. She reports no foreign bodies present.       Skin:  Positive for laceration and erythema (small injury noted tip of the nose.).      Objective:     Physical Exam   Constitutional: She is oriented to person, place, and time. She appears well-developed. She is cooperative.  Non-toxic appearance. She does not appear ill. No distress.   HENT:   Head: Normocephalic and atraumatic.   Ears:   Right Ear: Hearing, tympanic membrane, external ear and ear canal normal.   Left Ear: Hearing, tympanic membrane, external ear and ear canal normal.   Nose: Sinus tenderness present. No purulent discharge, nose lacerations or nasal deformity. No epistaxis. Right sinus exhibits no maxillary sinus tenderness and no frontal sinus tenderness. Left sinus exhibits no maxillary sinus tenderness and no frontal sinus tenderness.   Mouth/Throat: Uvula is midline, oropharynx is clear and moist and mucous membranes are normal. No trismus in the jaw. Normal dentition. No uvula swelling. No oropharyngeal exudate, posterior oropharyngeal edema or posterior oropharyngeal erythema.   Eyes: Conjunctivae and lids are normal. No scleral icterus.   Neck: Trachea normal and phonation normal. Neck supple. No edema present. No erythema present. No neck rigidity present.   Cardiovascular: Normal " rate, regular rhythm, normal heart sounds and normal pulses.   Pulmonary/Chest: Effort normal and breath sounds normal. No respiratory distress. She has no decreased breath sounds. She has no rhonchi.   Abdominal: Normal appearance.   Musculoskeletal: Normal range of motion.         General: No deformity. Normal range of motion.   Neurological: She is alert and oriented to person, place, and time. She exhibits normal muscle tone. Coordination normal.   Skin: Skin is warm, dry, intact, not diaphoretic and not pale. erythema (small injury noted tip of the nose.)   Psychiatric: Her speech is normal and behavior is normal. Judgment and thought content normal.   Nursing note and vitals reviewed.      Assessment:     1. Dog bite, initial encounter        Plan:       Dog bite, initial encounter  -     amoxicillin-clavulanate 875-125mg (AUGMENTIN) 875-125 mg per tablet; Take 1 tablet by mouth 2 (two) times daily. for 5 days  Dispense: 10 tablet; Refill: 0      Patient Instructions   Prophylactic antibiotic for 5 days.    Apply Neosporin as needed.    Try not to scratch the lesion.          If your condition worsens we recommend that you receive another evaluation at the emergency room immediately or contact your primary medical clinics after hours call service to discuss your concerns. You must understand that you've received an Urgent Care treatment only and that you may be released before all of your medical problems are known or treated. You, the patient, will arrange for follow up care as instructed.  Drink plenty of Fluids  Wash hands frequently using mild antibacterial soap lathering for at least 15 seconds then rinse  Get plenty of Rest  Follow up in 1-2 weeks with Primary Care physician if not significantly better.   If you are not allergic please take Tylenol every 4-6 hours as needed and/or Ibuprofen every 6-8 hours as needed, over the counter for pain or fever.    My notes were dictated with M*Modal Fluency  Software. Any misspellings or nonsensical grammar should be attributed to its use and allowances made for errors and typographic syntactical error(s).

## 2023-08-02 NOTE — PATIENT INSTRUCTIONS
Prophylactic antibiotic for 5 days.    Apply Neosporin as needed.    Try not to scratch the lesion.          If your condition worsens we recommend that you receive another evaluation at the emergency room immediately or contact your primary medical clinics after hours call service to discuss your concerns. You must understand that you've received an Urgent Care treatment only and that you may be released before all of your medical problems are known or treated. You, the patient, will arrange for follow up care as instructed.  Drink plenty of Fluids  Wash hands frequently using mild antibacterial soap lathering for at least 15 seconds then rinse  Get plenty of Rest  Follow up in 1-2 weeks with Primary Care physician if not significantly better.   If you are not allergic please take Tylenol every 4-6 hours as needed and/or Ibuprofen every 6-8 hours as needed, over the counter for pain or fever.

## 2023-09-12 ENCOUNTER — OFFICE VISIT (OUTPATIENT)
Dept: URGENT CARE | Facility: CLINIC | Age: 53
End: 2023-09-12
Payer: COMMERCIAL

## 2023-09-12 VITALS
HEART RATE: 93 BPM | HEIGHT: 65 IN | TEMPERATURE: 98 F | DIASTOLIC BLOOD PRESSURE: 72 MMHG | BODY MASS INDEX: 37.99 KG/M2 | SYSTOLIC BLOOD PRESSURE: 155 MMHG | WEIGHT: 228 LBS | RESPIRATION RATE: 18 BRPM | OXYGEN SATURATION: 98 %

## 2023-09-12 DIAGNOSIS — T20.10XA SUPERFICIAL BURN OF FACE, INITIAL ENCOUNTER: Primary | ICD-10-CM

## 2023-09-12 PROCEDURE — 99213 OFFICE O/P EST LOW 20 MIN: CPT | Mod: S$GLB,,, | Performed by: NURSE PRACTITIONER

## 2023-09-12 PROCEDURE — 99213 PR OFFICE/OUTPT VISIT, EST, LEVL III, 20-29 MIN: ICD-10-PCS | Mod: S$GLB,,, | Performed by: NURSE PRACTITIONER

## 2023-09-12 RX ORDER — SILVER SULFADIAZINE 10 G/1000G
CREAM TOPICAL 2 TIMES DAILY
Qty: 50 G | Refills: 0 | Status: SHIPPED | OUTPATIENT
Start: 2023-09-12 | End: 2023-09-22

## 2023-09-12 RX ORDER — MUPIROCIN 20 MG/G
OINTMENT TOPICAL 3 TIMES DAILY
Qty: 30 G | Refills: 0 | Status: SHIPPED | OUTPATIENT
Start: 2023-09-12 | End: 2023-09-19

## 2023-09-12 NOTE — PROGRESS NOTES
"Subjective:      Patient ID: Flor Feliz is a 53 y.o. female.    Vitals:  height is 5' 5" (1.651 m) and weight is 103.4 kg (228 lb). Her oral temperature is 98.2 °F (36.8 °C). Her blood pressure is 155/72 (abnormal) and her pulse is 93. Her respiration is 18 and oxygen saturation is 98%.     Chief Complaint: Burn    Pt states she was waxing face and the wax was too hot and burned her face. Pt tried to self treat with otc burn medication and neosporin and has not had relief and came to be evaluated and treated with some to prevent infection ad scarring.     Burn  The incident occurred 2 days ago. The burns occurred at home. The burns occurred while working on a project. The burns were a result of contact with a hot liquid. The burns are located on the face. The pain is at a severity of 0/10. The patient is experiencing no pain. Treatments tried: neosporin. The treatment provided no relief.       Skin:  Positive for wound and abrasion. Negative for color change, pale, rash, laceration, lesion, skin thickening/induration, puncture wound, erythema, bruising, abscess, avulsion and hives.   Allergic/Immunologic: Negative for hives.      Objective:     Physical Exam   Constitutional: She is oriented to person, place, and time. She appears well-developed.   HENT:   Head: Normocephalic and atraumatic. Head is without abrasion, without contusion and without laceration.   Ears:   Right Ear: External ear normal.   Left Ear: External ear normal.   Nose: Nose normal.   Mouth/Throat: Oropharynx is clear and moist and mucous membranes are normal.   Eyes: Conjunctivae, EOM and lids are normal. Pupils are equal, round, and reactive to light.   Neck: Trachea normal and phonation normal. Neck supple.   Cardiovascular: Normal rate, regular rhythm and normal heart sounds.   Pulmonary/Chest: Effort normal and breath sounds normal. No stridor. No respiratory distress.   Musculoskeletal: Normal range of motion.         General: " Normal range of motion.   Neurological: She is alert and oriented to person, place, and time.   Skin: Skin is warm, dry, intact and no rash. Capillary refill takes less than 2 seconds. burn (1st degree burn to right jawline, healing.) No abrasion, No bruising, No erythema and No ecchymosis   Psychiatric: Her speech is normal and behavior is normal. Judgment and thought content normal.   Nursing note and vitals reviewed.      Assessment:     1. Superficial burn of face, initial encounter        Plan:       Superficial burn of face, initial encounter  -     mupirocin (BACTROBAN) 2 % ointment; Apply topically 3 (three) times daily. for 7 days  Dispense: 30 g; Refill: 0  -     silver sulfADIAZINE 1% (SILVADENE) 1 % cream; Apply topically 2 (two) times daily. for 10 days  Dispense: 50 g; Refill: 0      You must understand that you've received an Urgent Care treatment only and that you may be released before all your medical problems are known or treated. You, the patient, will arrange for follow up care as instructed.  Follow up with your PCP or specialty clinic as directed in the next 1-2 weeks if not improved or as needed.  You can call [phone] to schedule an appointment with the appropriate provider.  If your condition worsens we recommend that you receive another evaluation at the emergency room immediately or contact your primary medical clinics after hours call service to discuss your concerns.  Please return here or go to the Emergency Department for any concerns or worsening of condition.

## 2023-09-16 DIAGNOSIS — E88.810 METABOLIC SYNDROME: ICD-10-CM

## 2023-09-16 DIAGNOSIS — E66.9 OBESITY (BMI 30-39.9): ICD-10-CM

## 2023-09-16 NOTE — TELEPHONE ENCOUNTER
Care Due:                  Date            Visit Type   Department     Provider  --------------------------------------------------------------------------------                                EP -                              PRIMARY      [facility] FAMILY  Last Visit: 02-      CARE ([facility])   MEDICINE       Jono Wasserman                              MYCHART                              FOLLOWUP/OF  [facility]  Next Visit: 10-      [facility] VISIT   MEDICINE       Jono Wasserman                                                            Last  Test          Frequency    Reason                     Performed    Due Date  --------------------------------------------------------------------------------    HBA1C.......  6 months...  semaglutide..............  03-   09-    [facility] Embedded Care Due Messages. Reference number: [id number].   9/16/2023 1:42:23 PM CDT

## 2023-09-25 DIAGNOSIS — M79.672 LEFT FOOT PAIN: Primary | ICD-10-CM

## 2023-09-26 ENCOUNTER — HOSPITAL ENCOUNTER (OUTPATIENT)
Dept: RADIOLOGY | Facility: HOSPITAL | Age: 53
Discharge: HOME | End: 2023-09-26
Attending: ORTHOPAEDIC SURGERY
Payer: COMMERCIAL

## 2023-09-26 ENCOUNTER — OFFICE VISIT (OUTPATIENT)
Dept: ORTHOPEDICS | Facility: CLINIC | Age: 53
End: 2023-09-26
Payer: COMMERCIAL

## 2023-09-26 ENCOUNTER — CLINICAL SUPPORT (OUTPATIENT)
Dept: REHABILITATION | Facility: HOSPITAL | Age: 53
End: 2023-09-26
Attending: ORTHOPAEDIC SURGERY
Payer: COMMERCIAL

## 2023-09-26 DIAGNOSIS — Q79.8 CONGENITAL CONTRACTURE OF LEFT GASTROCNEMIUS MUSCLE: ICD-10-CM

## 2023-09-26 DIAGNOSIS — M76.60 INSERTIONAL ACHILLES TENDINOPATHY: ICD-10-CM

## 2023-09-26 DIAGNOSIS — M76.60 INSERTIONAL ACHILLES TENDINOPATHY: Primary | ICD-10-CM

## 2023-09-26 DIAGNOSIS — M79.672 LEFT FOOT PAIN: ICD-10-CM

## 2023-09-26 DIAGNOSIS — M72.2 PLANTAR FASCIITIS OF LEFT FOOT: ICD-10-CM

## 2023-09-26 DIAGNOSIS — Q79.8 CONGENITAL CONTRACTURE OF LEFT GASTROCNEMIUS MUSCLE: Primary | ICD-10-CM

## 2023-09-26 PROCEDURE — 73630 X-RAY EXAM OF FOOT: CPT | Mod: 26,LT,, | Performed by: RADIOLOGY

## 2023-09-26 PROCEDURE — 73630 X-RAY EXAM OF FOOT: CPT | Mod: TC,PO,LT

## 2023-09-26 PROCEDURE — 97110 THERAPEUTIC EXERCISES: CPT | Mod: PO

## 2023-09-26 PROCEDURE — 1160F RVW MEDS BY RX/DR IN RCRD: CPT | Mod: CPTII,S$GLB,, | Performed by: ORTHOPAEDIC SURGERY

## 2023-09-26 PROCEDURE — 99204 OFFICE O/P NEW MOD 45 MIN: CPT | Mod: S$GLB,,, | Performed by: ORTHOPAEDIC SURGERY

## 2023-09-26 PROCEDURE — 99204 PR OFFICE/OUTPT VISIT, NEW, LEVL IV, 45-59 MIN: ICD-10-PCS | Mod: S$GLB,,, | Performed by: ORTHOPAEDIC SURGERY

## 2023-09-26 PROCEDURE — 99999 PR PBB SHADOW E&M-EST. PATIENT-LVL II: ICD-10-PCS | Mod: PBBFAC,,, | Performed by: ORTHOPAEDIC SURGERY

## 2023-09-26 PROCEDURE — 1159F PR MEDICATION LIST DOCUMENTED IN MEDICAL RECORD: ICD-10-PCS | Mod: CPTII,S$GLB,, | Performed by: ORTHOPAEDIC SURGERY

## 2023-09-26 PROCEDURE — 73630 XR FOOT COMPLETE 3 VIEW LEFT: ICD-10-PCS | Mod: 26,LT,, | Performed by: RADIOLOGY

## 2023-09-26 PROCEDURE — 99999 PR PBB SHADOW E&M-EST. PATIENT-LVL II: CPT | Mod: PBBFAC,,, | Performed by: ORTHOPAEDIC SURGERY

## 2023-09-26 PROCEDURE — 73610 X-RAY EXAM OF ANKLE: CPT | Mod: 26,LT,, | Performed by: RADIOLOGY

## 2023-09-26 PROCEDURE — 97161 PT EVAL LOW COMPLEX 20 MIN: CPT | Mod: PO

## 2023-09-26 PROCEDURE — 3044F PR MOST RECENT HEMOGLOBIN A1C LEVEL <7.0%: ICD-10-PCS | Mod: CPTII,S$GLB,, | Performed by: ORTHOPAEDIC SURGERY

## 2023-09-26 PROCEDURE — 1159F MED LIST DOCD IN RCRD: CPT | Mod: CPTII,S$GLB,, | Performed by: ORTHOPAEDIC SURGERY

## 2023-09-26 PROCEDURE — 73610 XR ANKLE COMPLETE 3 VIEW LEFT: ICD-10-PCS | Mod: 26,LT,, | Performed by: RADIOLOGY

## 2023-09-26 PROCEDURE — 3044F HG A1C LEVEL LT 7.0%: CPT | Mod: CPTII,S$GLB,, | Performed by: ORTHOPAEDIC SURGERY

## 2023-09-26 PROCEDURE — 73610 X-RAY EXAM OF ANKLE: CPT | Mod: TC,PO,LT

## 2023-09-26 PROCEDURE — 1160F PR REVIEW ALL MEDS BY PRESCRIBER/CLIN PHARMACIST DOCUMENTED: ICD-10-PCS | Mod: CPTII,S$GLB,, | Performed by: ORTHOPAEDIC SURGERY

## 2023-09-26 PROCEDURE — 4010F PR ACE/ARB THEARPY RXD/TAKEN: ICD-10-PCS | Mod: CPTII,S$GLB,, | Performed by: ORTHOPAEDIC SURGERY

## 2023-09-26 PROCEDURE — 4010F ACE/ARB THERAPY RXD/TAKEN: CPT | Mod: CPTII,S$GLB,, | Performed by: ORTHOPAEDIC SURGERY

## 2023-09-26 RX ORDER — MELOXICAM 15 MG/1
15 TABLET ORAL DAILY
Qty: 30 TABLET | Refills: 2 | Status: SHIPPED | OUTPATIENT
Start: 2023-09-26 | End: 2023-12-25

## 2023-09-26 NOTE — PLAN OF CARE
[facility]   Physical Therapy Initial Evaluation      Name: Flor Feliz  Clinic Number: [id number]    Therapy Diagnosis:   Encounter Diagnoses   Name Primary?    Congenital contracture of left gastrocnemius muscle     Insertional Achilles tendinopathy     Plantar fasciitis of left foot         Physician: Augusto Hill MD    Physician Orders: PT Eval and Treat   Medical Diagnosis from Referral: Congenital contracture of left gastrocnemius muscle [Q79.8], Insertional Achilles tendinopathy [M76.60], Plantar fasciitis of left foot [M72.2]  Evaluation Date: 9/26/2023  Authorization Period Expiration: 9/25/24  Plan of Care Expiration: 11/21/23  Progress Note Due: 10/25/23  Visit # / Visits authorized: 1/ 16   FOTO: 1/3    Precautions: Standard     Time In: 1606  Time Out: 1658  Total Billable Time: 52 minutes    Subjective     Date of onset: 3 weeks ago    History of current condition - Flor reports: Pt has been having pain in her left heel for 3 weeks.  She has increased pain when getting up out of the bed in the morning and when standing still.  If she walks for a little bit and stops using her foot, she has shooting and throbbing pains.  She also has increased pain whenever putting weight on her foot after not standing on it for a while.  She says whenever she walks some it still hurts but not as bad.  Pt said she started hurting and gradually the pain got worse.  She bought some plantar fasciitis inserts which did not really help.  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  Pt received a walking boot from the doctor today which she began wearing.  Pt has a PMHx of high blood pressure.  Pt has had no recent falls.  Pt says when sitting in a recliner, her heel hurts due to having pressure on it.  Pt does not really exercise besides walking at work.             Falls: No    Imaging: X-ray: 9/26/23  EXAMINATION:  XR ANKLE COMPLETE 3 VIEW  LEFT     CLINICAL HISTORY:  Pain in left foot     TECHNIQUE:  AP, lateral and oblique views of the left ankle were performed.     COMPARISON:  None     FINDINGS:  The ankle mortise appears intact.  No obvious fracture or dislocation is noted.  Osseous structures appear well mineralized and well aligned.  Achilles and plantar calcaneal spurring is noted.  An os peroneal or os cuboid appears to be present.  Talonavicular joint space loss is noted suggesting degenerative change.     Impression:     See above    EXAMINATION:  XR FOOT COMPLETE 3 VIEW LEFT     CLINICAL HISTORY:  .  Pain in left foot     TECHNIQUE:  AP, lateral and oblique views of the left foot were performed.     COMPARISON:  None     FINDINGS:  Interphalangeal joint space loss is noted diffusely.  Fusion of the distal interphalangeal joints of the 4th and 5th digits appear to be present.  An os navicularis appears to be present.  Osseous demineralization is noted diffusely.  An os cuboid appears to be present.  Achilles and plantar calcaneal spurring is noted.  Talonavicular joint space loss is noted suggesting degenerative change.  A posterior talar spur or os trigone appears to be present.     Impression:     See above    Prior Therapy: No   Prior Level of Function: Independent  Current Level of Function: Independent    Pain:  Current 3/10, worst 6/10, best 0/10   Location: from mid foot to heel   Description: sharp    Patients goals: To stop hurting     Medical History:   Past Medical History:   Diagnosis Date    GERD (gastroesophageal reflux disease)     Hypertension     Kidney stones     Mixed hyperlipidemia     Paroxysmal atrial fibrillation        Surgical History:   Flor Feliz  has a past surgical history that includes Cholecystectomy; Tubal ligation; Colonoscopy (2019); and Cardiac catheterization.    Medications:   Flor has a current medication list which includes the following prescription(s): cetirizine, cholecalciferol  (vitamin d3), fluticasone propionate, hydrochlorothiazide, lisinopril, meloxicam, metoprolol succinate, ondansetron, phentermine-topiramate, progesterone, rosuvastatin, ozempic, and testosterone and estradiol cyp.    Allergies:   Review of patient's allergies indicates:   Allergen Reactions    Macrobid [nitrofurantoin monohyd/m-cryst] Diarrhea        Objective    Posture:  Legs externally rotated in sitting    Palpation:  Soreness in left foot    AROM:  Ankle:  Inversion: R: 28 degrees, L: 21 degrees  Eversion: R: 38 degrees, L: 36 degrees  Dorsiflexion: R: 16 degrees, L: 7 degrees  Plantarflexion: R: 40 degrees, L: 32 degrees    Calf length:   R: 16 degrees, L: 7 degrees    MMTs/Strength:  Knee flexion: R: 4+/5, L: 4/5  Knee extension: R: 5/5, L: 5/5  Hip flexion: R: 4/5, L: 4/5  Ankle inversion: R: 5/5, L: 5/5 (within available range)  Ankle eversion: R: 5/5, L: 5/5  Ankle dorsiflexion: R: 5/5, L: 5/5 (within available range)  Ankle plantarflexion: R: 5/5, L: 5/5    Special tests:  Anterior drawer ankle test: negative B  Talar tilt inversion test: R: negative, L: some laxity  Talar tilt eversion test: negative B      Intake Outcome Measure for FOTO LEFS Survey    Therapist reviewed FOTO scores for Flor Feliz on 9/26/2023.   FOTO report - see Media section or FOTO account episode details.    Intake Score: 62         Treatment     Total Treatment time (time-based codes) separate from Evaluation: 10 minutes     Flor received the treatments listed below:      therapeutic exercises to develop strength, endurance, ROM, and flexibility for 10 minutes including:  Calf stretch x 3 minutes left   Ankle alphabet x 3 L  Calf raises  HEP review      Patient Education and Home Exercises     Education provided:   - Pt received a HEP consisting of calf stretching, ankle alphabet, and calf raises.     Written Home Exercises Provided: yes. Exercises were reviewed and Flor was able to demonstrate them prior to the  end of the session.  Flor demonstrated good  understanding of the education provided. See EMR under Patient Instructions for exercises provided during therapy sessions.    Assessment     Flor is a 53 y.o. female referred to outpatient Physical Therapy with a medical diagnosis of Congenital contracture of left gastrocnemius muscle [Q79.8], Insertional Achilles tendinopathy [M76.60], Plantar fasciitis of left foot [M72.2]. Patient presents with left mid foot and heel pain for the past 3 weeks.  An x-ray showed some calcaneal spurring.  Pt has tightness in her left calf.  Pt also has decreased ankle mobility in her left ankle with inversion and dorsiflexion.  She has LE weakness in bilateral knee flexion and bilateral hip flexion.  She scored a 62 on the LEFS.  Patient prognosis is Good.  Patient will benefit from skilled outpatient Physical Therapy to address the deficits stated above and in the chart below, provide patient /family education, and to maximize patient's level of independence.     Plan of care discussed with patient: Yes    Anticipated Barriers for therapy: None    Problem List:  Left midfoot/heel pain  2. LE weakness  3. Decreased left ankle mobility  4. Left calf tightness    Medical Necessity is demonstrated by the following  History  Co-morbidities and personal factors that may impact the plan of care [x] LOW: no personal factors / co-morbidities  [] MODERATE: 1-2 personal factors / co-morbidities  [] HIGH: 3+ personal factors / co-morbidities         Examination  Body Structures and Functions, activity limitations and participation restrictions that may impact the plan of care [x] LOW: addressing 1-2 elements  [] MODERATE: 3+ elements  [] HIGH: 4+ elements (please support below)       Clinical Presentation [x] LOW: stable  [] MODERATE: Evolving  [] HIGH: Unstable     Decision Making/ Complexity Score: low       Goals:  Short Term Goals: 4 weeks   Pt will be independent with the initial  phase of their HEP in order to continue to make functional gains outside of physical therapy.  Pt's left calf length will improve to 9 degrees to demonstrate improved flexibility.     Pt's left heel/foot pain will decrease to 2/10 even when being on her feet initially.     Pt's left knee flexion strength will improve to 4+/5 MMT score.   Pt's left ankle inversion AROM will improve to 25 degrees to demonstrate improved mobility.   Pt's left ankle dorsiflexion AROM will improve to 9 degrees to demonstrate improved mobility.    Long Term Goals: 8 weeks   Pt will be independent with the final phase of their HEP in order to continue to make functional gains outside of physical therapy.  Pt's left calf length will improve to 11 degrees to demonstrate improved flexibility.     Pt's left heel/foot pain will decrease to 1/10 even when being on her feet initially.     Pt's bilateral knee flexion strength will improve to 5/5 MMT score.   Pt's left ankle inversion AROM will improve to 28 degrees to demonstrate improved mobility.   Pt's left ankle dorsiflexion AROM will improve to 12 degrees to demonstrate improved mobility.      Plan     Plan of care Certification: 9/26/2023 to 11/21/23.    Outpatient Physical Therapy 1 times weekly for 8 weeks to include the following interventions: Electrical Stimulation  , Gait Training, Manual Therapy, Moist Heat/ Ice, Neuromuscular Re-ed, Patient Education, Therapeutic Activities, and Therapeutic Exercise.     Christiano Fulton, PT , DPT

## 2023-09-26 NOTE — PROGRESS NOTES
Status/Diagnosis:  Left gastroc contracture; plantar fasciitis; and insertional Achilles tendinitis.  Date of Surgery: none  Date of Injury: September 2023  Return visit: PRN  X-rays on Return: pending patient complaint    Chief Complaint:   Chief Complaint   Patient presents with    Right Foot - Pain     Present History:  Flor Feliz is a 53 y.o. female who presents today via referral from Dr. Jono Wasserman.  Patient with a 3+ week history of worsening left heel pain.  Minimal pain at rest, increased with weight-bearing.  Denies any history of injury or other inciting event.  Specifically with pain 1st thing in the morning getting out of bed taking 1st steps.  Also when going from a seated to standing position.  Most of the time it is dull and throbbing but sometimes sharp stabbing.  Points to the glabrous skin junction over the posterior heel just distal to the Achilles insertion.  Has tried over-the-counter oral Aleve with minimal symptomatic relief.  No previous evaluation or treatment.  No history of boot or brace immobilization, physical therapy, corticosteroid injection.    Past medical history significant for hypertension and hyperlipidemia.  Denies tobacco use.    Works as an  at [employer].      Past Medical History:   Diagnosis Date    GERD (gastroesophageal reflux disease)     Hypertension     Kidney stones     Mixed hyperlipidemia     Paroxysmal atrial fibrillation        Past Surgical History:   Procedure Laterality Date    CARDIAC CATHETERIZATION      EJ approx 2007    CHOLECYSTECTOMY      COLONOSCOPY  2019    r/c 10 yr     TUBAL LIGATION         Current Outpatient Medications   Medication Sig    cetirizine 10 mg chewable tablet Take 10 mg by mouth once daily.    cholecalciferol, vitamin D3, 1,250 mcg (50,000 unit) capsule Take 50,000 Units by mouth every 7 days.    fluticasone propionate (FLONASE) 50 mcg/actuation nasal spray 1 spray (50 mcg total) by Each  Nostril route 2 (two) times daily as needed for Rhinitis or Allergies.    hydroCHLOROthiazide (MICROZIDE) 12.5 mg capsule TAKE 1 CAPSULE(12.5 MG) BY MOUTH EVERY DAY    lisinopriL (PRINIVIL,ZESTRIL) 20 MG tablet Take 1 tablet (20 mg total) by mouth 2 (two) times daily.    meloxicam (MOBIC) 15 MG tablet Take 1 tablet (15 mg total) by mouth once daily.    metoprolol succinate (TOPROL-XL) 25 MG 24 hr tablet Take 1 tablet (25 mg total) by mouth every evening.    ondansetron (ZOFRAN-ODT) 4 MG TbDL Take 1 tablet (4 mg total) by mouth every 6 (six) hours as needed.    phentermine-topiramate (QSYMIA) 11.25-69 mg CM24 Take 1 capsule by mouth once daily. Due PCP visit    progesterone (PROMETRIUM) 200 MG capsule TAKE ONE CAPSULE BY MOUTH EVERY NIGHT AT BEDTIME ON DAYS 1 THROUGH 12 PER MONTH    rosuvastatin (CRESTOR) 5 MG tablet Take 1 tablet (5 mg total) by mouth once daily.    semaglutide (OZEMPIC) 1 mg/dose (4 mg/3 mL) Inject 1 mg into the skin every 7 days.    testosterone and estradiol cyp (ESTRADIOL-TESTOSTERONE IM) Inject into the muscle. Pellets Q 3 Month - Gyn Dr De La O     No current facility-administered medications for this visit.       Review of patient's allergies indicates:   Allergen Reactions    Macrobid [nitrofurantoin monohyd/m-cryst] Diarrhea       Family History   Problem Relation Age of Onset    No Known Problems Mother     No Known Problems Father        Social History     Socioeconomic History    Marital status:     Number of children: 2   Occupational History    Occupation: [employer]    Tobacco Use    Smoking status: Never    Smokeless tobacco: Never   Substance and Sexual Activity    Alcohol use: Yes    Drug use: No    Sexual activity: Yes     Partners: Male   Social History Narrative     & remarried        Physical exam:  There were no vitals filed for this visit.  There is no height or weight on file to calculate BMI.  General: In no apparent distress; well developed and well  nourished.  HEENT: normocephalic; atraumatic.  Cardiovascular: regular rate.  Respiratory: no increased work of breathing.  Musculoskeletal:   Gait:  Mild antalgic  Inspection:   Subtle cavus.  No gross abnormality noted.  Patient localizes pain to the posterior heel at the glabrous skin junction, actually just distal to the Achilles insertion.  Also with tenderness along the plantar heel at the medial calcaneal tuberosity.  No pain referable to the ankle.  No laxity with anterior drawer testing.  No pain or laxity with Lisfranc stress.  Silfverskiold:  Significant positive  Alignment:  Knee: neutral               Ankle: neutral              Hindfoot: neutral              Forefoot: neutral   Strength:              Dorsiflexion 5/5  Plantar flexion 5/5  Inversion 5/5   Eversion 5/5   Sensation:              SILT distally   ROM:              Ankle: Full and painless.              Subtalar: Painless inversion and eversion   Pulses: 2+ DP/PT pulses.                   Imaging Studies/Outside documentation:  I have ordered/reviewed/interpreted the following images/outside documentation:  1. Weight bearing 3-views of Left foot and ankle:   No acute bony abnormality noted.  Mild enthesophyte at the Achilles insertion with adjacent Ariana deformity.  Moderate enthesophyte at the plantar fascial origin.  Ankle mortise remains congruent.  No significant degenerative joint changes.        Assessment:  Flor Feliz is a 53 y.o. female with Left gastroc contracture; plantar fasciitis; and insertional Achilles tendinitis.     Plan:   Clinical and radiographic findings were discussed.  Recommend conservative management to include a brief period of boot immobilization.  Patient may weight bear as tolerated.    Also provide a short course of oral NSAIDs, Mobic prescription provided.  Patient denies any history of GI ulceration or kidney disorder.    We will initiate physical therapy per protocol.  Patient voiced  understanding.  All questions were answered.  Should symptoms persist or worsen, MRI left ankle without contrast for further evaluation.      We performed a custom orthotic/brace fitting, adjusting and training with the patient. The patient demonstrated understanding and proper care. This was performed for 15 minutes.      This note was created using voice recognition software and may contain grammatical errors.

## 2023-09-28 ENCOUNTER — OFFICE VISIT (OUTPATIENT)
Dept: FAMILY MEDICINE | Facility: CLINIC | Age: 53
End: 2023-09-28
Payer: COMMERCIAL

## 2023-09-28 DIAGNOSIS — R79.89 ABNORMAL CBC: ICD-10-CM

## 2023-09-28 DIAGNOSIS — G47.00 INSOMNIA, UNSPECIFIED TYPE: ICD-10-CM

## 2023-09-28 DIAGNOSIS — Z00.00 WELLNESS EXAMINATION: ICD-10-CM

## 2023-09-28 DIAGNOSIS — R45.4 IRRITABILITY: ICD-10-CM

## 2023-09-28 DIAGNOSIS — E88.810 METABOLIC SYNDROME: ICD-10-CM

## 2023-09-28 DIAGNOSIS — E78.5 HYPERLIPIDEMIA, UNSPECIFIED HYPERLIPIDEMIA TYPE: ICD-10-CM

## 2023-09-28 DIAGNOSIS — R53.83 FATIGUE, UNSPECIFIED TYPE: Primary | ICD-10-CM

## 2023-09-28 DIAGNOSIS — F41.1 GAD (GENERALIZED ANXIETY DISORDER): ICD-10-CM

## 2023-09-28 DIAGNOSIS — E66.9 OBESITY (BMI 30-39.9): ICD-10-CM

## 2023-09-28 PROCEDURE — 3044F HG A1C LEVEL LT 7.0%: CPT | Mod: CPTII,95,, | Performed by: INTERNAL MEDICINE

## 2023-09-28 PROCEDURE — 1160F RVW MEDS BY RX/DR IN RCRD: CPT | Mod: CPTII,95,, | Performed by: INTERNAL MEDICINE

## 2023-09-28 PROCEDURE — 99214 OFFICE O/P EST MOD 30 MIN: CPT | Mod: 95,,, | Performed by: INTERNAL MEDICINE

## 2023-09-28 PROCEDURE — 4010F ACE/ARB THERAPY RXD/TAKEN: CPT | Mod: CPTII,95,, | Performed by: INTERNAL MEDICINE

## 2023-09-28 PROCEDURE — 1159F PR MEDICATION LIST DOCUMENTED IN MEDICAL RECORD: ICD-10-PCS | Mod: CPTII,95,, | Performed by: INTERNAL MEDICINE

## 2023-09-28 PROCEDURE — 3044F PR MOST RECENT HEMOGLOBIN A1C LEVEL <7.0%: ICD-10-PCS | Mod: CPTII,95,, | Performed by: INTERNAL MEDICINE

## 2023-09-28 PROCEDURE — 4010F PR ACE/ARB THEARPY RXD/TAKEN: ICD-10-PCS | Mod: CPTII,95,, | Performed by: INTERNAL MEDICINE

## 2023-09-28 PROCEDURE — 1159F MED LIST DOCD IN RCRD: CPT | Mod: CPTII,95,, | Performed by: INTERNAL MEDICINE

## 2023-09-28 PROCEDURE — 1160F PR REVIEW ALL MEDS BY PRESCRIBER/CLIN PHARMACIST DOCUMENTED: ICD-10-PCS | Mod: CPTII,95,, | Performed by: INTERNAL MEDICINE

## 2023-09-28 PROCEDURE — 99214 PR OFFICE/OUTPT VISIT, EST, LEVL IV, 30-39 MIN: ICD-10-PCS | Mod: 95,,, | Performed by: INTERNAL MEDICINE

## 2023-09-28 RX ORDER — SERTRALINE HYDROCHLORIDE 50 MG/1
TABLET, FILM COATED ORAL
Qty: 30 TABLET | Refills: 3 | Status: SHIPPED | OUTPATIENT
Start: 2023-09-28 | End: 2024-02-09 | Stop reason: SDUPTHER

## 2023-09-28 NOTE — PROGRESS NOTES
Subjective:    The patient location is: [place]  The chief complaint leading to consultation is: anxiety     Visit type: audiovisual    Face to Face time with patient: 20  minutes of total time spent on the encounter, which includes face to face time and non-face to face time preparing to see the patient (eg, review of tests), Obtaining and/or reviewing separately obtained history, Documenting clinical information in the electronic or other health record, Independently interpreting results (not separately reported) and communicating results to the patient/family/caregiver, or Care coordination (not separately reported).         Each patient to whom he or she provides medical services by telemedicine is:  (1) informed of the relationship between the physician and patient and the respective role of any other health care provider with respect to management of the patient; and (2) notified that he or she may decline to receive medical services by telemedicine and may withdraw from such care at any time.    Notes:      Patient ID: Flor Feliz is a 53 y.o. female.    Chief Complaint: Follow-up and Anxiety   Anxiety and irritability.  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  Finding herself easily to get angered.  Lots of work hours irritability at home.  Sleep isn't great  Previously took Wellbutrin during divorce          Also going through menopause and getting HRT  pellets.        Anxiety  Presents for initial visit. The problem has been unchanged. Symptoms include insomnia, irritability and nervous/anxious behavior. Patient reports no chest pain, confusion or palpitations. Symptoms occur most days. The severity of symptoms is moderate. The symptoms are aggravated by work stress. The quality of sleep is fair. Nighttime awakenings: occasional.         Gyn: 5/2022  Dr De La O   MMG:  3/2022  Colonoscopy:  2019 Dr. Ratliff r/c 10 years  Immunizations: Flu:  Tdap: 6/28/21  Shingles:  recommend Covid: yes   Smoker:  No      Covid early Feb 2023 - seen at urgent care. S/s resolved      Metabolic syndrome BMI 37 & 228. Rx Ozempic not losing weight, Qsymia 11.25 -- increased dose 15 easily irritability reduced down and resolved.        Mixed HLD:  Uncontrolled //      Hypertension:  Controlled:   Rx : lisinopril 20 BID, Metoprolol 25     Vitamin-D deficiency:  Gyn placed prescription dose will start over the counter after completed    Allergies controlled  ____________________________________________________________________________________________________  Assessment & Plan:  1. Fatigue, unspecified type    2. Irritability    3. TOMA (generalized anxiety disorder)  - sertraline (ZOLOFT) 50 MG tablet; 1/2 tab po daily x 2 wks; then 1 tab po daily  Dispense: 30 tablet; Refill: 3    4. Insomnia, unspecified type    5. Hyperlipidemia, unspecified hyperlipidemia type  - Lipid Panel; Future  - Lipid Panel    6. Abnormal CBC  - Iron and TIBC; Future  - Iron and TIBC    7. Metabolic syndrome  - phentermine-topiramate (QSYMIA) 11.25-69 mg CM24; Take 1 capsule by mouth once daily.  Dispense: 30 capsule; Refill: 4    8. Obesity (BMI 30-39.9)  - phentermine-topiramate (QSYMIA) 11.25-69 mg CM24; Take 1 capsule by mouth once daily.  Dispense: 30 capsule; Refill: 4    9. Wellness examination  - Iron and TIBC; Future  - CBC Without Differential; Future  - Comprehensive Metabolic Panel; Future  - Lipid Panel; Future  - Iron and TIBC  - CBC Without Differential  - Comprehensive Metabolic Panel  - Lipid Panel     Fatigue, unspecified type    Irritability    TOMA (generalized anxiety disorder)  -     sertraline (ZOLOFT) 50 MG tablet; 1/2 tab po daily x 2 wks; then 1 tab po daily  Dispense: 30 tablet; Refill: 3    Insomnia, unspecified type    Hyperlipidemia, unspecified hyperlipidemia type  -     Lipid Panel; Future; Expected date: 09/28/2023    Abnormal CBC  -     Iron and TIBC; Future; Expected date:  09/28/2023    Metabolic syndrome  -     phentermine-topiramate (QSYMIA) 11.25-69 mg CM24; Take 1 capsule by mouth once daily.  Dispense: 30 capsule; Refill: 4    Obesity (BMI 30-39.9)  -     phentermine-topiramate (QSYMIA) 11.25-69 mg CM24; Take 1 capsule by mouth once daily.  Dispense: 30 capsule; Refill: 4    Wellness examination  -     Iron and TIBC; Future; Expected date: 09/28/2023  -     CBC Without Differential; Future; Expected date: 09/28/2023  -     Comprehensive Metabolic Panel; Future; Expected date: 09/28/2023  -     Lipid Panel; Future; Expected date: 09/28/2023        Continue to work on regular exercise, maintain healthy weight, balanced diet. Avoid unhealthy habits: smoking, excessive alcohol intake.     Disclaimer: This note was partly generated using dictation software which may occasionally result in transcription errors  ____________________________________________________________________________________________________  Review of Systems:  Review of Systems   Constitutional:  Positive for irritability. Negative for activity change, appetite change and unexpected weight change.   HENT:  Negative for hearing loss, nosebleeds, rhinorrhea and trouble swallowing.    Eyes:  Negative for pain, discharge and visual disturbance.   Respiratory:  Negative for choking, chest tightness and wheezing.    Cardiovascular:  Negative for chest pain and palpitations.   Gastrointestinal:  Negative for blood in stool, constipation, diarrhea and vomiting.   Endocrine: Negative for polydipsia and polyuria.   Genitourinary:  Negative for difficulty urinating, dysuria, hematuria and menstrual problem.   Musculoskeletal:  Negative for arthralgias, joint swelling and neck pain.   Skin:  Negative for pallor.   Neurological:  Negative for facial asymmetry, weakness and headaches.   Hematological:  Does not bruise/bleed easily.   Psychiatric/Behavioral:  Negative for confusion and dysphoric mood. The patient is  "nervous/anxious and has insomnia.      Objective:     Wt Readings from Last 3 Encounters:   09/12/23 103.4 kg (228 lb)   08/02/23 103.4 kg (228 lb)   02/27/23 103.4 kg (228 lb 1.1 oz)     BP Readings from Last 3 Encounters:   09/12/23 (!) 155/72   08/02/23 137/85   02/27/23 120/74       Lab Results   Component Value Date    WBC 11.9 (H) 03/10/2023    HGB 16.6 (H) 03/10/2023    HCT 49.6 (H) 03/10/2023     03/10/2023     03/10/2023    K 3.5 03/10/2023    CL 98 03/10/2023    ALT 10 03/10/2023    AST 13 03/10/2023    CO2 22 03/10/2023    CREATININE 0.83 03/10/2023    BUN 17 03/10/2023    GLU 72 03/10/2023      Hemoglobin A1c   Date Value Ref Range Status   03/10/2023 5.4 4.8 - 5.6 % Final     Comment:              Prediabetes: 5.7 - 6.4           Diabetes: >6.4           Glycemic control for adults with diabetes: <7.0     06/22/2021 5.5 4.8 - 5.6 % Final     Comment:              Prediabetes: 5.7 - 6.4           Diabetes: >6.4           Glycemic control for adults with diabetes: <7.0        Lab Results   Component Value Date    TSH 0.897 03/10/2023    TSH 1.480 07/18/2022    TSH 1.080 06/22/2021     No results found for: "FREET4"  Lab Results   Component Value Date    LDLCALC 164 (H) 03/10/2023    LDLCALC 156 (H) 07/18/2022    LDLCALC 116 (H) 06/22/2021     Lab Results   Component Value Date    TRIG 73 03/10/2023    TRIG 70 07/18/2022    TRIG 67 06/22/2021            Physical Exam  Constitutional:       Appearance: Normal appearance.       Medication List with Changes/Refills   New Medications    SERTRALINE (ZOLOFT) 50 MG TABLET    1/2 tab po daily x 2 wks; then 1 tab po daily   Current Medications    CETIRIZINE 10 MG CHEWABLE TABLET    Take 10 mg by mouth once daily.    CHOLECALCIFEROL, VITAMIN D3, 1,250 MCG (50,000 UNIT) CAPSULE    Take 50,000 Units by mouth every 7 days.    FLUTICASONE PROPIONATE (FLONASE) 50 MCG/ACTUATION NASAL SPRAY    1 spray (50 mcg total) by Each Nostril route 2 (two) times daily as " needed for Rhinitis or Allergies.    HYDROCHLOROTHIAZIDE (MICROZIDE) 12.5 MG CAPSULE    TAKE 1 CAPSULE(12.5 MG) BY MOUTH EVERY DAY    LISINOPRIL (PRINIVIL,ZESTRIL) 20 MG TABLET    Take 1 tablet (20 mg total) by mouth 2 (two) times daily.    MELOXICAM (MOBIC) 15 MG TABLET    Take 1 tablet (15 mg total) by mouth once daily.    METOPROLOL SUCCINATE (TOPROL-XL) 25 MG 24 HR TABLET    Take 1 tablet (25 mg total) by mouth every evening.    ONDANSETRON (ZOFRAN-ODT) 4 MG TBDL    Take 1 tablet (4 mg total) by mouth every 6 (six) hours as needed.    PROGESTERONE (PROMETRIUM) 200 MG CAPSULE    TAKE ONE CAPSULE BY MOUTH EVERY NIGHT AT BEDTIME ON DAYS 1 THROUGH 12 PER MONTH    ROSUVASTATIN (CRESTOR) 5 MG TABLET    Take 1 tablet (5 mg total) by mouth once daily.    SEMAGLUTIDE (OZEMPIC) 1 MG/DOSE (4 MG/3 ML)    Inject 1 mg into the skin every 7 days.    TESTOSTERONE AND ESTRADIOL CYP (ESTRADIOL-TESTOSTERONE IM)    Inject into the muscle. Pellets Q 3 Month - Gyn Dr De La O   Changed and/or Refilled Medications    Modified Medication Previous Medication    PHENTERMINE-TOPIRAMATE (QSYMIA) 11.25-69 MG CM24 phentermine-topiramate (QSYMIA) 11.25-69 mg CM24       Take 1 capsule by mouth once daily.    Take 1 capsule by mouth once daily. Due PCP visit

## 2023-10-05 ENCOUNTER — CLINICAL SUPPORT (OUTPATIENT)
Dept: REHABILITATION | Facility: HOSPITAL | Age: 53
End: 2023-10-05
Payer: COMMERCIAL

## 2023-10-05 DIAGNOSIS — M72.2 PLANTAR FASCIITIS OF LEFT FOOT: ICD-10-CM

## 2023-10-05 DIAGNOSIS — M76.60 INSERTIONAL ACHILLES TENDINOPATHY: Primary | ICD-10-CM

## 2023-10-05 DIAGNOSIS — Q79.8 CONGENITAL CONTRACTURE OF LEFT GASTROCNEMIUS MUSCLE: ICD-10-CM

## 2023-10-05 PROCEDURE — 97112 NEUROMUSCULAR REEDUCATION: CPT | Mod: PO

## 2023-10-05 PROCEDURE — 97110 THERAPEUTIC EXERCISES: CPT | Mod: PO

## 2023-10-05 NOTE — PROGRESS NOTES
[facility]   Physical Therapy Treatment Note      Name: Flor Feliz  Clinic Number: [id number]    Therapy Diagnosis:   Encounter Diagnoses   Name Primary?    Congenital contracture of left gastrocnemius muscle     Insertional Achilles tendinopathy Yes    Plantar fasciitis of left foot      Physician: Augusto Hill MD    Visit Date: 10/5/2023    Physician Orders: PT Eval and Treat   Medical Diagnosis from Referral: Congenital contracture of left gastrocnemius muscle [Q79.8], Insertional Achilles tendinopathy [M76.60], Plantar fasciitis of left foot [M72.2]  Evaluation Date: 9/26/2023  Authorization Period Expiration: 9/25/24  Plan of Care Expiration: 11/21/23  Progress Note Due: 10/25/23  Visit # / Visits authorized: 1/ 16   FOTO: 1/3     Precautions: Standard      Time In: 1605  Time Out: 1659  Total time: 54 minutes  Total Billable Time: 31 minutes due to concurrent treatment    PTA Visit #: 0/5       Subjective     Patient reports: Pt has been stretching her foot out on a piece of wood.  .  She was compliant with home exercise program.  Response to previous treatment: Good  Functional change: To be determinded    Pain: Pre-PT: 2/10, Post-PT: 1-2/10  Location: from left mid foot to heel     Objective      Objective Measures updated at progress report unless specified.     Treatment     Flor received the treatments listed below:      therapeutic exercises to develop strength, endurance, ROM, and flexibility for 46 minutes including:  Bike level 3 x 10 minutes  Lacross ball roll outs x 3 minutes L  Calf stretch x 3 mins  Reverse calf raises 3 x 15  HS curls with red TB 3 x 12 B  Ankle inversion with red TB 3 x 10 B  Towel scrunches 3 x 20 B    neuromuscular re-education activities to improve: Balance for 8 minutes. The following activities were included:  Tandem stance 60 s maximum B (stopped by PT due to doing well)  Single leg stance R: 19 s maximum, L: 18 s  max      Patient Education and Home Exercises       Education provided:   - Pt previously received a HEP consisting of calf stretching, ankle alphabet, and calf raises.      Written Home Exercises Provided: yes. Exercises were reviewed and Flor was able to demonstrate them prior to the end of the session.  Flor demonstrated good  understanding of the education provided. See EMR under Patient Instructions for exercises provided during therapy sessions.    Assessment     Pt tolerated her treatment well.  She says doing her HEP and after leaving her evaluation she has felt better.  Pt did well with tandem stance but had difficulty with single leg stance.  She required some verbal and visual cueing to improve with doing towel scrunches.  Flor Is progressing well towards her goals. Flor is a 53 y.o. female referred to outpatient Physical Therapy with a medical diagnosis of Congenital contracture of left gastrocnemius muscle [Q79.8], Insertional Achilles tendinopathy [M76.60], Plantar fasciitis of left foot [M72.2]. Patient prognosis is Good. Patient will continue to benefit from skilled outpatient physical therapy to address the deficits listed in the problem list box on initial evaluation, provide pt/family education and to maximize pt's level of independence in the home and community environment.     Plan of care discussed with patient: Yes     Anticipated Barriers for therapy: None     Problem List:  Left midfoot/heel pain  2. LE weakness  3. Decreased left ankle mobility  4. Left calf tightness    Goals:  Short Term Goals: 4 weeks   Pt will be independent with the initial phase of their HEP in order to continue to make functional gains outside of physical therapy.  Pt's left calf length will improve to 9 degrees to demonstrate improved flexibility.     Pt's left heel/foot pain will decrease to 2/10 even when being on her feet initially.     Pt's left knee flexion strength will improve to 4+/5 MMT  score.   Pt's left ankle inversion AROM will improve to 25 degrees to demonstrate improved mobility.   Pt's left ankle dorsiflexion AROM will improve to 9 degrees to demonstrate improved mobility.     Long Term Goals: 8 weeks   Pt will be independent with the final phase of their HEP in order to continue to make functional gains outside of physical therapy.  Pt's left calf length will improve to 11 degrees to demonstrate improved flexibility.     Pt's left heel/foot pain will decrease to 1/10 even when being on her feet initially.     Pt's bilateral knee flexion strength will improve to 5/5 MMT score.   Pt's left ankle inversion AROM will improve to 28 degrees to demonstrate improved mobility.   Pt's left ankle dorsiflexion AROM will improve to 12 degrees to demonstrate improved mobility.    Plan     Continue PT plan of care.     Christiano Fulton, PT, DPT

## 2023-10-07 LAB
ALBUMIN SERPL-MCNC: 4.4 G/DL (ref 3.8–4.9)
ALBUMIN/GLOB SERPL: 1.9 {RATIO} (ref 1.2–2.2)
ALP SERPL-CCNC: 87 IU/L (ref 44–121)
ALT SERPL-CCNC: 13 IU/L (ref 0–32)
AST SERPL-CCNC: 14 IU/L (ref 0–40)
BILIRUB SERPL-MCNC: 0.2 MG/DL (ref 0–1.2)
BUN SERPL-MCNC: 17 MG/DL (ref 6–24)
BUN/CREAT SERPL: 21 (ref 9–23)
CALCIUM SERPL-MCNC: 9 MG/DL (ref 8.7–10.2)
CHLORIDE SERPL-SCNC: 103 MMOL/L (ref 96–106)
CHOLEST SERPL-MCNC: 158 MG/DL (ref 100–199)
CO2 SERPL-SCNC: 23 MMOL/L (ref 20–29)
CREAT SERPL-MCNC: 0.82 MG/DL (ref 0.57–1)
ERYTHROCYTE [DISTWIDTH] IN BLOOD BY AUTOMATED COUNT: 12.6 % (ref 11.7–15.4)
EST. GFR  (NO RACE VARIABLE): 85 ML/MIN/1.73
GLOBULIN SER CALC-MCNC: 2.3 G/DL (ref 1.5–4.5)
GLUCOSE SERPL-MCNC: 80 MG/DL (ref 70–99)
HCT VFR BLD AUTO: 46 % (ref 34–46.6)
HDLC SERPL-MCNC: 51 MG/DL
HGB BLD-MCNC: 15.5 G/DL (ref 11.1–15.9)
IRON SATN MFR SERPL: 17 % (ref 15–55)
IRON SERPL-MCNC: 54 UG/DL (ref 27–159)
LDLC SERPL CALC-MCNC: 91 MG/DL (ref 0–99)
MCH RBC QN AUTO: 31.3 PG (ref 26.6–33)
MCHC RBC AUTO-ENTMCNC: 33.7 G/DL (ref 31.5–35.7)
MCV RBC AUTO: 93 FL (ref 79–97)
PLATELET # BLD AUTO: 257 X10E3/UL (ref 150–450)
POTASSIUM SERPL-SCNC: 4.4 MMOL/L (ref 3.5–5.2)
PROT SERPL-MCNC: 6.7 G/DL (ref 6–8.5)
RBC # BLD AUTO: 4.96 X10E6/UL (ref 3.77–5.28)
SODIUM SERPL-SCNC: 137 MMOL/L (ref 134–144)
TIBC SERPL-MCNC: 318 UG/DL (ref 250–450)
TRIGL SERPL-MCNC: 86 MG/DL (ref 0–149)
UIBC SERPL-MCNC: 264 UG/DL (ref 131–425)
VLDLC SERPL CALC-MCNC: 16 MG/DL (ref 5–40)
WBC # BLD AUTO: 9.6 X10E3/UL (ref 3.4–10.8)

## 2023-10-11 ENCOUNTER — CLINICAL SUPPORT (OUTPATIENT)
Dept: REHABILITATION | Facility: HOSPITAL | Age: 53
End: 2023-10-11
Payer: COMMERCIAL

## 2023-10-11 DIAGNOSIS — Q79.8 CONGENITAL CONTRACTURE OF LEFT GASTROCNEMIUS MUSCLE: ICD-10-CM

## 2023-10-11 DIAGNOSIS — M72.2 PLANTAR FASCIITIS OF LEFT FOOT: ICD-10-CM

## 2023-10-11 DIAGNOSIS — M76.60 INSERTIONAL ACHILLES TENDINOPATHY: Primary | ICD-10-CM

## 2023-10-11 PROCEDURE — 97110 THERAPEUTIC EXERCISES: CPT | Mod: PO

## 2023-10-11 PROCEDURE — 97112 NEUROMUSCULAR REEDUCATION: CPT | Mod: PO

## 2023-10-11 NOTE — PROGRESS NOTES
[facility]   Physical Therapy Treatment Note      Name: Flor Feliz  Clinic Number: [id number]    Therapy Diagnosis:   Encounter Diagnoses   Name Primary?    Congenital contracture of left gastrocnemius muscle     Insertional Achilles tendinopathy Yes    Plantar fasciitis of left foot        Physician: Augusto Hill MD    Visit Date: 10/11/2023    Physician Orders: PT Eval and Treat   Medical Diagnosis from Referral: Congenital contracture of left gastrocnemius muscle [Q79.8], Insertional Achilles tendinopathy [M76.60], Plantar fasciitis of left foot [M72.2]  Evaluation Date: 9/26/2023  Authorization Period Expiration: 9/25/24  Plan of Care Expiration: 11/21/23  Progress Note Due: 10/25/23  Visit # / Visits authorized: 2/ 16   FOTO: 1/3     Precautions: Standard      Time In: 1608  Time Out: 1706  Total Billable Time: 58 minutes    PTA Visit #: 0/5       Subjective     Patient reports: Pt has some left heel pain today.  She had some soreness for a couple of days after doing reverse calf raises.    She was compliant with home exercise program.  Response to previous treatment: Soreness  Functional change: To be determined    Pain: Pre-PT: 2/10, Post-PT: 1-2/10  Location: from left mid foot to heel     Objective      Objective Measures updated at progress report unless specified.     Treatment     Flor received the treatments listed below:      therapeutic exercises to develop strength, endurance, ROM, and flexibility for 45 minutes including:  Stepper level 4 x 10 minutes  Lacross ball roll outs x 3 minutes L  Calf stretch x 3 mins  Reverse calf raises 3 x 15  HS curls with red TB 3 x 12 B  Ankle inversion with red TB 3 x 12 B  Ankle eversion with red TB 3 x 12 B  Ankle dorsiflexion with red TB 3 x 12 B  Towel scrunches 2 x 20 B    neuromuscular re-education activities to improve: Balance for 13 minutes. The following activities were included:   Tandem stance with Air Ex R:  57.6 s max, L: 42.5 s max   Single leg stance R: 33.7 s maximum, L: 38 s max      Patient Education and Home Exercises       Education provided:   - Pt previously received a HEP consisting of calf stretching, ankle alphabet, and calf raises.      Written Home Exercises Provided: yes. Exercises were reviewed and Flor was able to demonstrate them prior to the end of the session.  Flor demonstrated good  understanding of the education provided. See EMR under Patient Instructions for exercises provided during therapy sessions.    Assessment   Pt tolerated her treatment well.  Pt progressed to doing 3 sets of 12 for ankle inversion with a red theraband.  Ankle dorsiflexion and eversion strengthening with a red theraband was added to her exercises today.  Pt improved with her maximum single leg stance times.  Pt also worked on tandem stance with an Air Ex and performed better on the right.  Pt reports she is feeling better in her left heel pain.   Flor Is progressing well towards her goals. Flor is a 53 y.o. female referred to outpatient Physical Therapy with a medical diagnosis of Congenital contracture of left gastrocnemius muscle [Q79.8], Insertional Achilles tendinopathy [M76.60], Plantar fasciitis of left foot [M72.2]. Patient prognosis is Good. Patient will continue to benefit from skilled outpatient physical therapy to address the deficits listed in the problem list box on initial evaluation, provide pt/family education and to maximize pt's level of independence in the home and community environment.     Plan of care discussed with patient: Yes     Anticipated Barriers for therapy: None     Problem List:  Left midfoot/heel pain  2. LE weakness  3. Decreased left ankle mobility  4. Left calf tightness    Goals:  Short Term Goals: 4 weeks   Pt will be independent with the initial phase of their HEP in order to continue to make functional gains outside of physical therapy.  Pt's left calf length  will improve to 9 degrees to demonstrate improved flexibility.     Pt's left heel/foot pain will decrease to 2/10 even when being on her feet initially.     Pt's left knee flexion strength will improve to 4+/5 MMT score.   Pt's left ankle inversion AROM will improve to 25 degrees to demonstrate improved mobility.   Pt's left ankle dorsiflexion AROM will improve to 9 degrees to demonstrate improved mobility.     Long Term Goals: 8 weeks   Pt will be independent with the final phase of their HEP in order to continue to make functional gains outside of physical therapy.  Pt's left calf length will improve to 11 degrees to demonstrate improved flexibility.     Pt's left heel/foot pain will decrease to 1/10 even when being on her feet initially.     Pt's bilateral knee flexion strength will improve to 5/5 MMT score.   Pt's left ankle inversion AROM will improve to 28 degrees to demonstrate improved mobility.   Pt's left ankle dorsiflexion AROM will improve to 12 degrees to demonstrate improved mobility.    Plan     Continue PT plan of care.     Christiano Fulton, PT, DPT

## 2023-10-17 DIAGNOSIS — E78.5 HYPERLIPIDEMIA, UNSPECIFIED HYPERLIPIDEMIA TYPE: ICD-10-CM

## 2023-10-17 NOTE — TELEPHONE ENCOUNTER
No care due was identified.  [facility] Embedded Care Due Messages. Reference number: [id number].   10/17/2023 12:31:21 PM CDT

## 2023-10-18 ENCOUNTER — CLINICAL SUPPORT (OUTPATIENT)
Dept: REHABILITATION | Facility: HOSPITAL | Age: 53
End: 2023-10-18
Payer: COMMERCIAL

## 2023-10-18 DIAGNOSIS — Q79.8 CONGENITAL CONTRACTURE OF LEFT GASTROCNEMIUS MUSCLE: ICD-10-CM

## 2023-10-18 DIAGNOSIS — M72.2 PLANTAR FASCIITIS OF LEFT FOOT: ICD-10-CM

## 2023-10-18 DIAGNOSIS — M76.60 INSERTIONAL ACHILLES TENDINOPATHY: Primary | ICD-10-CM

## 2023-10-18 PROCEDURE — 97110 THERAPEUTIC EXERCISES: CPT | Mod: PO

## 2023-10-18 PROCEDURE — 97112 NEUROMUSCULAR REEDUCATION: CPT | Mod: PO

## 2023-10-18 RX ORDER — ROSUVASTATIN CALCIUM 5 MG/1
5 TABLET, COATED ORAL DAILY
Qty: 90 TABLET | Refills: 3 | Status: SHIPPED | OUTPATIENT
Start: 2023-10-18

## 2023-10-18 NOTE — TELEPHONE ENCOUNTER
Refill Decision Note   Flor Feliz  is requesting a refill authorization.  Brief Assessment and Rationale for Refill:  Approve     Medication Therapy Plan:       Medication Reconciliation Completed: No    Comments:     No Care Gaps recommended.     Note composed:2:14 PM 10/18/2023

## 2023-10-18 NOTE — PROGRESS NOTES
[facility]   Physical Therapy Treatment Note      Name: Flor Feliz  Clinic Number: [id number]    Therapy Diagnosis:   Encounter Diagnoses   Name Primary?    Congenital contracture of left gastrocnemius muscle     Insertional Achilles tendinopathy Yes    Plantar fasciitis of left foot          Physician: Augusto Hill MD    Visit Date: 10/18/2023    Physician Orders: PT Eval and Treat   Medical Diagnosis from Referral: Congenital contracture of left gastrocnemius muscle [Q79.8], Insertional Achilles tendinopathy [M76.60], Plantar fasciitis of left foot [M72.2]  Evaluation Date: 9/26/2023  Authorization Period Expiration: 9/25/24  Plan of Care Expiration: 11/21/23  Progress Note Due: 10/25/23  Visit # / Visits authorized: 3/16   FOTO: 1/3     Precautions: Standard      Time In: 1605  Time Out: 1658  Total Billable Time: 53 minutes    PTA Visit #: 0/5       Subjective     Patient reports: Pt said yesterday she wore regular shoes to work with less cushion instead of her regular Sketches with more cushion.    She was compliant with home exercise program.  Response to previous treatment: Good  Functional change: To be determined    Pain: Pre-PT: 3/10, Post-PT: 2/10  Location: from left mid foot to heel     Objective      Objective Measures updated at progress report unless specified.     Treatment     Flor received the treatments listed below:      therapeutic exercises to develop strength, endurance, ROM, and flexibility for 42 minutes including:  Stepper level 5 x 10 minutes  Reverse calf raises 3 x 15  HS curls with green TB 3 x 12 B  Ankle inversion with green TB 3 x 12 B  Ankle eversion with green TB 3 x 12 B  Ankle dorsiflexion with green TB 3 x 12 B  Ankle plantarflexion with green TB 3 x 12 B  Towel scrunches 3 x 20 B    neuromuscular re-education activities to improve: Balance for 11 minutes. The following activities were included:   Tandem stance with Air Ex R: 57.6  s max, L: 42.5 s max   Single leg stance R: 45 s maximum, L: 46.2 s max      Patient Education and Home Exercises       Education provided:   - Pt previously received a HEP consisting of calf stretching, ankle alphabet, and calf raises.      Written Home Exercises Provided: yes. Exercises were reviewed and Flor was able to demonstrate them prior to the end of the session.  Flor demonstrated good  understanding of the education provided. See EMR under Patient Instructions for exercises provided during therapy sessions.    Assessment   Pt tolerated her treatment well.  Pt progressed to level 5 on the Stepper.  Pt progressed to using the green theraband for hamstring curls, ankle dorsiflexion, ankle inversion, and ankle eversion.  Pt's pain slightly decreased from 3 to 2/10, but she had more pain due to ambulating with different shoes other than her tennis shoes yesterday.   Flor Is progressing well towards her goals. Flor is a 53 y.o. female referred to outpatient Physical Therapy with a medical diagnosis of Congenital contracture of left gastrocnemius muscle [Q79.8], Insertional Achilles tendinopathy [M76.60], Plantar fasciitis of left foot [M72.2]. Patient prognosis is Good. Patient will continue to benefit from skilled outpatient physical therapy to address the deficits listed in the problem list box on initial evaluation, provide pt/family education and to maximize pt's level of independence in the home and community environment.     Plan of care discussed with patient: Yes     Anticipated Barriers for therapy: None     Problem List:  Left midfoot/heel pain  2. LE weakness  3. Decreased left ankle mobility  4. Left calf tightness    Goals:  Short Term Goals: 4 weeks   Pt will be independent with the initial phase of their HEP in order to continue to make functional gains outside of physical therapy.  Pt's left calf length will improve to 9 degrees to demonstrate improved flexibility.     Pt's  left heel/foot pain will decrease to 2/10 even when being on her feet initially.     Pt's left knee flexion strength will improve to 4+/5 MMT score.   Pt's left ankle inversion AROM will improve to 25 degrees to demonstrate improved mobility.   Pt's left ankle dorsiflexion AROM will improve to 9 degrees to demonstrate improved mobility.     Long Term Goals: 8 weeks   Pt will be independent with the final phase of their HEP in order to continue to make functional gains outside of physical therapy.  Pt's left calf length will improve to 11 degrees to demonstrate improved flexibility.     Pt's left heel/foot pain will decrease to 1/10 even when being on her feet initially.     Pt's bilateral knee flexion strength will improve to 5/5 MMT score.   Pt's left ankle inversion AROM will improve to 28 degrees to demonstrate improved mobility.   Pt's left ankle dorsiflexion AROM will improve to 12 degrees to demonstrate improved mobility.    Plan     Continue PT plan of care.     Christiano Fulton, PT, DPT

## 2023-10-25 ENCOUNTER — CLINICAL SUPPORT (OUTPATIENT)
Dept: REHABILITATION | Facility: HOSPITAL | Age: 53
End: 2023-10-25
Payer: COMMERCIAL

## 2023-10-25 DIAGNOSIS — M72.2 PLANTAR FASCIITIS OF LEFT FOOT: ICD-10-CM

## 2023-10-25 DIAGNOSIS — Q79.8 CONGENITAL CONTRACTURE OF LEFT GASTROCNEMIUS MUSCLE: ICD-10-CM

## 2023-10-25 DIAGNOSIS — M76.60 INSERTIONAL ACHILLES TENDINOPATHY: Primary | ICD-10-CM

## 2023-10-25 PROCEDURE — 97110 THERAPEUTIC EXERCISES: CPT | Mod: PO

## 2023-10-25 NOTE — PROGRESS NOTES
[facility]   Physical Therapy Reassessment      Name: Flor Feliz  Clinic Number: [id number]     Therapy Diagnosis:        Encounter Diagnoses   Name Primary?    Congenital contracture of left gastrocnemius muscle      Insertional Achilles tendinopathy      Plantar fasciitis of left foot          Physician: Augusto Hill MD     Physician Orders: PT Eval and Treat   Medical Diagnosis from Referral: Congenital contracture of left gastrocnemius muscle [Q79.8], Insertional Achilles tendinopathy [M76.60], Plantar fasciitis of left foot [M72.2]  Evaluation Date: 9/26/2023  Authorization Period Expiration: 9/25/24  Plan of Care Expiration: 11/29/23  Progress Note Due: 11/29/23  Visit # / Visits authorized: 5/16   FOTO: 2/3     Precautions: Standard      Time In: 1614  Time Out: 1658  Total Billable Time: 44 minutes     PTA Visit #: 0/5      Subjective      Date of onset: 3 weeks ago     History of current condition - Flor reports: Pt has been having pain in her left heel for 3 weeks.  She has increased pain when getting up out of the bed in the morning and when standing still.  If she walks for a little bit and stops using her foot, she has shooting and throbbing pains.  She also has increased pain whenever putting weight on her foot after not standing on it for a while.  She says whenever she walks some it still hurts but not as bad.  Pt said she started hurting and gradually the pain got worse.  She bought some plantar fasciitis inserts which did not really help.  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  Pt received a walking boot from the doctor today which she began wearing.  Pt has a PMHx of high blood pressure.  Pt has had no recent falls.  Pt says when sitting in a recliner, her heel hurts due to having pressure on it.  Pt does not really exercise besides walking at work.              10/25/23: Pt feels like PT has been helping.   She said it helps for several days after.  She said sometimes when it closer to when she has to come to her appointments, her pain increases some, and she is not sure if it is due to not stretching as good as whenever she is here.  She has been compliant with her HEP.  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  She says if she stretches her legs in the morning when she first gets up and puts weight in her foot, it helps her.  Pt said it's about a 3/10 when walking on her foot initially, but then it subsides.  Pt has to wear shoes and cannot walk barefoot.  She says whenever she sits on a recliner too long, it can cause the back of her left foot to hurt.  Pt says she sometimes gets Hood horses in her feet.       Falls: No     Imaging: X-ray: 9/26/23  EXAMINATION:  XR ANKLE COMPLETE 3 VIEW LEFT     CLINICAL HISTORY:  Pain in left foot     TECHNIQUE:  AP, lateral and oblique views of the left ankle were performed.     COMPARISON:  None     FINDINGS:  The ankle mortise appears intact.  No obvious fracture or dislocation is noted.  Osseous structures appear well mineralized and well aligned.  Achilles and plantar calcaneal spurring is noted.  An os peroneal or os cuboid appears to be present.  Talonavicular joint space loss is noted suggesting degenerative change.     Impression:     See above     EXAMINATION:  XR FOOT COMPLETE 3 VIEW LEFT     CLINICAL HISTORY:  .  Pain in left foot     TECHNIQUE:  AP, lateral and oblique views of the left foot were performed.     COMPARISON:  None     FINDINGS:  Interphalangeal joint space loss is noted diffusely.  Fusion of the distal interphalangeal joints of the 4th and 5th digits appear to be present.  An os navicularis appears to be present.  Osseous demineralization is noted diffusely.  An os cuboid appears to be present.  Achilles and plantar calcaneal spurring is noted.  Talonavicular joint  space loss is noted suggesting degenerative change.  A posterior talar spur or os trigone appears to be present.     Impression:     See above     Prior Therapy: No   Prior Level of Function: Independent  Current Level of Function: Independent     Pain:  Current 3/10, worst 3-4/10, best 0/10   Location: from mid foot to heel   Description: sharp     Patients goals: To stop hurting     Medical History:        Past Medical History:   Diagnosis Date    GERD (gastroesophageal reflux disease)      Hypertension      Kidney stones      Mixed hyperlipidemia      Paroxysmal atrial fibrillation           Surgical History:   Flor Feliz  has a past surgical history that includes Cholecystectomy; Tubal ligation; Colonoscopy (2019); and Cardiac catheterization.     Medications:   Flor has a current medication list which includes the following prescription(s): cetirizine, cholecalciferol (vitamin d3), fluticasone propionate, hydrochlorothiazide, lisinopril, meloxicam, metoprolol succinate, ondansetron, phentermine-topiramate, progesterone, rosuvastatin, ozempic, and testosterone and estradiol cyp.     Allergies:        Review of patient's allergies indicates:   Allergen Reactions    Macrobid [nitrofurantoin monohyd/m-cryst] Diarrhea         Objective    Posture:  Legs externally rotated in sitting     Palpation:  Soreness in left foot     AROM:  Ankle:  Inversion: R: 29 degrees, L: 26 degrees  Eversion: R: 42 degrees, L: 32 degrees  Dorsiflexion: R: 14 degrees, L: 12 degrees  Plantarflexion: R: 53 degrees, L: 44 degrees     Calf length:   R: 20 degrees, L: 9 degrees     MMTs/Strength:  Knee flexion: R: 5/5, L: 5/5  Knee extension: R: 5/5, L: 5/5  Hip flexion: R: 4+/5, L: 4+/5  Ankle inversion: R: 5/5, L: 5/5 (within available range)  Ankle eversion: R: 5/5, L: 5/5  Ankle dorsiflexion: R: 5/5, L: 5/5   Ankle plantarflexion: R: 5/5, L: 5/5          Intake Outcome Measure for FOTO LEFS Survey     Therapist reviewed  FOTO scores for Flor Feliz on 10/25/2023.   FOTO report - see Media section or FOTO account episode details.     Intake Score: LEFS: 73, FOTO Primary: 79            Treatment      therapeutic exercises to develop strength, endurance, ROM, and flexibility for 44 minutes including:  Reassessment  Towel scrunches 3 x 20 B  [place] ball roll outs under left foot         Patient Education and Home Exercises      Education provided:   - Pt previously received a HEP consisting of calf stretching, ankle alphabet, and calf raises.      Written Home Exercises Provided: yes. Exercises were reviewed and Flor was able to demonstrate them prior to the end of the session.  Flor demonstrated good  understanding of the education provided. See EMR under Patient Instructions for exercises provided during therapy sessions.     Assessment    Flor is a 53 y.o. female referred to outpatient Physical Therapy with a medical diagnosis of Congenital contracture of left gastrocnemius muscle [Q79.8], Insertional Achilles tendinopathy [M76.60], Plantar fasciitis of left foot [M72.2].  Pt feels like physical therapy has been helping her.  Her left foot/heel pain is still there, but her at worse pain decreased from 6 to 3-4/10.  Pt's ankle AROM improved with bilateral ankle inversion, right ankle eversion, left ankle dorsiflexion, and bilateral ankle plantarflexion.  Her left ankle eversion and right ankle dorsiflexion AROM has decreased some.  Her LE has improved with bilateral knee flexion and bilateral hip flexion.  Her calf length has improved bilaterally.  Her LEFS score improved from 62 to 73.  Pt has met 4 of 6 short-term goals and 2 of 6 long-term goals.  Patient prognosis is Good.  Patient will continue to benefit from skilled outpatient Physical Therapy to address the deficits stated above and in the chart below, provide patient /family education, and to maximize patient's level of independence.      Plan of care  discussed with patient: Yes     Anticipated Barriers for therapy: None     Problem List:  Left midfoot/heel pain  2. LE weakness  3. Decreased left ankle mobility  4. Left calf tightness     Goals:  Short Term Goals: 4 weeks   Pt will be independent with the initial phase of their HEP in order to continue to make functional gains outside of physical therapy. (Met on 10/25/23)  Pt's left calf length will improve to 9 degrees to demonstrate improved flexibility.  (Met on 10/25/23)  Pt's left heel/foot pain will decrease to 2/10 even when being on her feet initially.     Pt's left knee flexion strength will improve to 4+/5 MMT score. (Met on 10/25/23)  Pt's left ankle inversion AROM will improve to 25 degrees to demonstrate improved mobility. (Met on 10/25/23)  Pt's left ankle dorsiflexion AROM will improve to 9 degrees to demonstrate improved mobility. (Met on 10/25/23)     Long Term Goals: 8 weeks   Pt will be independent with the final phase of their HEP in order to continue to make functional gains outside of physical therapy.  Pt's left calf length will improve to 11 degrees to demonstrate improved flexibility.     Pt's left heel/foot pain will decrease to 1/10 even when being on her feet initially.     Pt's bilateral knee flexion strength will improve to 5/5 MMT score. (Met on 10/25/23)  Pt's left ankle inversion AROM will improve to 28 degrees to demonstrate improved mobility.   Pt's left ankle dorsiflexion AROM will improve to 12 degrees to demonstrate improved mobility. (Met on 10/25/23)        Plan      Plan of care Certification: 9/26/2023 to 11/29/23.     Outpatient Physical Therapy 1 times weekly for 4-5 more weeks to include the following interventions: Electrical Stimulation  , Gait Training, Manual Therapy, Moist Heat/ Ice, Neuromuscular Re-ed, Patient Education, Therapeutic Activities, and Therapeutic Exercise.      Christiano Fulton, PT , DPT

## 2023-10-27 ENCOUNTER — OFFICE VISIT (OUTPATIENT)
Dept: FAMILY MEDICINE | Facility: CLINIC | Age: 53
End: 2023-10-27
Payer: COMMERCIAL

## 2023-10-27 VITALS
HEIGHT: 65 IN | DIASTOLIC BLOOD PRESSURE: 82 MMHG | SYSTOLIC BLOOD PRESSURE: 136 MMHG | WEIGHT: 231.06 LBS | OXYGEN SATURATION: 98 % | BODY MASS INDEX: 38.5 KG/M2 | HEART RATE: 71 BPM

## 2023-10-27 DIAGNOSIS — N60.09 COMPLEX CYST OF BREAST: ICD-10-CM

## 2023-10-27 DIAGNOSIS — F41.1 GAD (GENERALIZED ANXIETY DISORDER): ICD-10-CM

## 2023-10-27 DIAGNOSIS — E66.9 OBESITY (BMI 30-39.9): ICD-10-CM

## 2023-10-27 DIAGNOSIS — Z00.00 WELLNESS EXAMINATION: Primary | ICD-10-CM

## 2023-10-27 DIAGNOSIS — E78.5 HYPERLIPIDEMIA, UNSPECIFIED HYPERLIPIDEMIA TYPE: ICD-10-CM

## 2023-10-27 DIAGNOSIS — E88.810 METABOLIC SYNDROME: ICD-10-CM

## 2023-10-27 PROCEDURE — 3075F PR MOST RECENT SYSTOLIC BLOOD PRESS GE 130-139MM HG: ICD-10-PCS | Mod: CPTII,S$GLB,, | Performed by: INTERNAL MEDICINE

## 2023-10-27 PROCEDURE — 1159F PR MEDICATION LIST DOCUMENTED IN MEDICAL RECORD: ICD-10-PCS | Mod: CPTII,S$GLB,, | Performed by: INTERNAL MEDICINE

## 2023-10-27 PROCEDURE — 3008F PR BODY MASS INDEX (BMI) DOCUMENTED: ICD-10-PCS | Mod: CPTII,S$GLB,, | Performed by: INTERNAL MEDICINE

## 2023-10-27 PROCEDURE — 3075F SYST BP GE 130 - 139MM HG: CPT | Mod: CPTII,S$GLB,, | Performed by: INTERNAL MEDICINE

## 2023-10-27 PROCEDURE — 99396 PREV VISIT EST AGE 40-64: CPT | Mod: S$GLB,,, | Performed by: INTERNAL MEDICINE

## 2023-10-27 PROCEDURE — 3079F DIAST BP 80-89 MM HG: CPT | Mod: CPTII,S$GLB,, | Performed by: INTERNAL MEDICINE

## 2023-10-27 PROCEDURE — 3044F PR MOST RECENT HEMOGLOBIN A1C LEVEL <7.0%: ICD-10-PCS | Mod: CPTII,S$GLB,, | Performed by: INTERNAL MEDICINE

## 2023-10-27 PROCEDURE — 4010F PR ACE/ARB THEARPY RXD/TAKEN: ICD-10-PCS | Mod: CPTII,S$GLB,, | Performed by: INTERNAL MEDICINE

## 2023-10-27 PROCEDURE — 1160F PR REVIEW ALL MEDS BY PRESCRIBER/CLIN PHARMACIST DOCUMENTED: ICD-10-PCS | Mod: CPTII,S$GLB,, | Performed by: INTERNAL MEDICINE

## 2023-10-27 PROCEDURE — 99999 PR PBB SHADOW E&M-EST. PATIENT-LVL IV: CPT | Mod: PBBFAC,,, | Performed by: INTERNAL MEDICINE

## 2023-10-27 PROCEDURE — 3044F HG A1C LEVEL LT 7.0%: CPT | Mod: CPTII,S$GLB,, | Performed by: INTERNAL MEDICINE

## 2023-10-27 PROCEDURE — 99396 PR PREVENTIVE VISIT,EST,40-64: ICD-10-PCS | Mod: S$GLB,,, | Performed by: INTERNAL MEDICINE

## 2023-10-27 PROCEDURE — 1160F RVW MEDS BY RX/DR IN RCRD: CPT | Mod: CPTII,S$GLB,, | Performed by: INTERNAL MEDICINE

## 2023-10-27 PROCEDURE — 99999 PR PBB SHADOW E&M-EST. PATIENT-LVL IV: ICD-10-PCS | Mod: PBBFAC,,, | Performed by: INTERNAL MEDICINE

## 2023-10-27 PROCEDURE — 4010F ACE/ARB THERAPY RXD/TAKEN: CPT | Mod: CPTII,S$GLB,, | Performed by: INTERNAL MEDICINE

## 2023-10-27 PROCEDURE — 1159F MED LIST DOCD IN RCRD: CPT | Mod: CPTII,S$GLB,, | Performed by: INTERNAL MEDICINE

## 2023-10-27 PROCEDURE — 3079F PR MOST RECENT DIASTOLIC BLOOD PRESSURE 80-89 MM HG: ICD-10-PCS | Mod: CPTII,S$GLB,, | Performed by: INTERNAL MEDICINE

## 2023-10-27 PROCEDURE — 3008F BODY MASS INDEX DOCD: CPT | Mod: CPTII,S$GLB,, | Performed by: INTERNAL MEDICINE

## 2023-10-27 RX ORDER — PHENTERMINE AND TOPIRAMATE 15; 92 MG/1; MG/1
1 CAPSULE, EXTENDED RELEASE ORAL DAILY
Qty: 30 CAPSULE | Refills: 5 | Status: SHIPPED | OUTPATIENT
Start: 2023-10-27

## 2023-10-27 NOTE — PROGRESS NOTES
Subjective:       Patient ID: Flor eFliz is a 53 y.o. female.    Chief Complaint: Follow-up and Annual Exam   Follow up   Doing better since last visit               Anxiety  Presents for initial visit. The problem has been unchanged. Symptoms include insomnia, irritability and nervous/anxious behavior. Patient reports no chest pain, confusion or palpitations. Symptoms occur most days. The severity of symptoms is moderate. The symptoms are aggravated by work stress. The quality of sleep is fair. Nighttime awakenings: occasional.            Gyn: Dr De La O HRT  pellets.    MM/23 + cysts   Colonoscopy:  10/2019 Dr. Ratliff r/c 10 years  Immunizations: Flu:  Tdap:   Shingles: recommend Covid: yes   Smoker:  No      Wellness     Anxiety: much improved. Rx Zoloft 50     Hx anxiety, irritability, work, easily upset.      Metabolic syndrome BMI 37 & 228. Rx Ozempic 1, Qsymia 11.25 -- increased dose 15 easily irritability- like to r.c        Mixed HLD:  controlled // - down 91  Rx Crestor 5      Hypertension:  Controlled:   Rx Lisinopril 20 BID, Metoprolol 25     Vitamin-D deficiency:  Gyn started. ___________________________________________________________________________________________________  ____________________________________________________________________________________________________  Assessment & Plan:  1. Wellness examination  - Comprehensive Metabolic Panel; Future  - Lipid Panel; Future    2. Complex cyst of breast    3. Hyperlipidemia, unspecified hyperlipidemia type  - Comprehensive Metabolic Panel; Future  - Lipid Panel; Future    4. TOMA (generalized anxiety disorder)    5. Metabolic syndrome    6. Obesity (BMI 30-39.9)  - phentermine-topiramate (QSYMIA) 15-92 mg CM24; Take 1 capsule by mouth once daily.  Dispense: 30 capsule; Refill: 5     Wellness examination  -     Comprehensive Metabolic Panel; Future; Expected date: 10/27/2023  -     Lipid Panel; Future; Expected date:  10/27/2023    Complex cyst of breast    Hyperlipidemia, unspecified hyperlipidemia type  -     Comprehensive Metabolic Panel; Future; Expected date: 10/27/2023  -     Lipid Panel; Future; Expected date: 10/27/2023    TOMA (generalized anxiety disorder)    Metabolic syndrome    Obesity (BMI 30-39.9)  -     phentermine-topiramate (QSYMIA) 15-92 mg CM24; Take 1 capsule by mouth once daily.  Dispense: 30 capsule; Refill: 5        Continue to work on regular exercise, maintain healthy weight, balanced diet. Avoid unhealthy habits: smoking, excessive alcohol intake.     Disclaimer: This note was partly generated using dictation software which may occasionally result in transcription errors  ____________________________________________________________________________________________________  Review of Systems:  Review of Systems   Constitutional:  Negative for appetite change.   HENT:  Negative for nosebleeds.    Eyes:  Negative for pain.   Respiratory:  Negative for choking.    Cardiovascular:  Negative for chest pain.   Gastrointestinal:  Negative for blood in stool.   Genitourinary:  Negative for hematuria.   Musculoskeletal:  Negative for joint swelling.   Skin:  Negative for pallor.   Neurological:  Negative for facial asymmetry.   Hematological:  Does not bruise/bleed easily.   Psychiatric/Behavioral:  Negative for confusion.        Objective:     Wt Readings from Last 3 Encounters:   10/27/23 104.8 kg (231 lb 0.7 oz)   09/12/23 103.4 kg (228 lb)   08/02/23 103.4 kg (228 lb)     BP Readings from Last 3 Encounters:   10/27/23 136/82   09/12/23 (!) 155/72   08/02/23 137/85       Lab Results   Component Value Date    WBC 9.6 10/06/2023    HGB 15.5 10/06/2023    HCT 46.0 10/06/2023     10/06/2023     10/06/2023    K 4.4 10/06/2023     10/06/2023    ALT 13 10/06/2023    AST 14 10/06/2023    CO2 23 10/06/2023    CREATININE 0.82 10/06/2023    BUN 17 10/06/2023    GLU 80 10/06/2023      Hemoglobin A1c  "  Date Value Ref Range Status   03/10/2023 5.4 4.8 - 5.6 % Final     Comment:              Prediabetes: 5.7 - 6.4           Diabetes: >6.4           Glycemic control for adults with diabetes: <7.0     06/22/2021 5.5 4.8 - 5.6 % Final     Comment:              Prediabetes: 5.7 - 6.4           Diabetes: >6.4           Glycemic control for adults with diabetes: <7.0        Lab Results   Component Value Date    TSH 0.897 03/10/2023    TSH 1.480 07/18/2022    TSH 1.080 06/22/2021     No results found for: "FREET4"  Lab Results   Component Value Date    LDLCALC 91 10/06/2023    LDLCALC 164 (H) 03/10/2023    LDLCALC 156 (H) 07/18/2022     Lab Results   Component Value Date    TRIG 86 10/06/2023    TRIG 73 03/10/2023    TRIG 70 07/18/2022            Physical Exam  Constitutional:       Appearance: Normal appearance.   HENT:      Head: Normocephalic and atraumatic.   Eyes:      Extraocular Movements: Extraocular movements intact.      Pupils: Pupils are equal, round, and reactive to light.   Cardiovascular:      Rate and Rhythm: Normal rate.   Pulmonary:      Effort: Pulmonary effort is normal.   Neurological:      Mental Status: She is alert.         Medication List with Changes/Refills   New Medications    PHENTERMINE-TOPIRAMATE (QSYMIA) 15-92 MG CM24    Take 1 capsule by mouth once daily.   Current Medications    CETIRIZINE 10 MG CHEWABLE TABLET    Take 10 mg by mouth once daily.    CHOLECALCIFEROL, VITAMIN D3, 1,250 MCG (50,000 UNIT) CAPSULE    Take 50,000 Units by mouth every 7 days.    FLUTICASONE PROPIONATE (FLONASE) 50 MCG/ACTUATION NASAL SPRAY    1 spray (50 mcg total) by Each Nostril route 2 (two) times daily as needed for Rhinitis or Allergies.    HYDROCHLOROTHIAZIDE (MICROZIDE) 12.5 MG CAPSULE    TAKE 1 CAPSULE(12.5 MG) BY MOUTH EVERY DAY    LISINOPRIL (PRINIVIL,ZESTRIL) 20 MG TABLET    Take 1 tablet (20 mg total) by mouth 2 (two) times daily.    MELOXICAM (MOBIC) 15 MG TABLET    Take 1 tablet (15 mg total) by " mouth once daily.    METOPROLOL SUCCINATE (TOPROL-XL) 25 MG 24 HR TABLET    Take 1 tablet (25 mg total) by mouth every evening.    ONDANSETRON (ZOFRAN-ODT) 4 MG TBDL    Take 1 tablet (4 mg total) by mouth every 6 (six) hours as needed.    PROGESTERONE (PROMETRIUM) 200 MG CAPSULE    Take 200 mg by mouth. 12 day monthly    ROSUVASTATIN (CRESTOR) 5 MG TABLET    Take 1 tablet (5 mg total) by mouth once daily.    SEMAGLUTIDE (OZEMPIC) 1 MG/DOSE (4 MG/3 ML)    Inject 1 mg into the skin every 7 days.    SERTRALINE (ZOLOFT) 50 MG TABLET    1/2 tab po daily x 2 wks; then 1 tab po daily    TESTOSTERONE AND ESTRADIOL CYP (ESTRADIOL-TESTOSTERONE IM)    Inject into the muscle. Pellets Q 3 Month - Gyn Dr De La O   Discontinued Medications    PHENTERMINE-TOPIRAMATE (QSYMIA) 11.25-69 MG CM24    Take 1 capsule by mouth once daily.

## 2023-10-31 ENCOUNTER — TELEPHONE (OUTPATIENT)
Dept: FAMILY MEDICINE | Facility: CLINIC | Age: 53
End: 2023-10-31
Payer: COMMERCIAL

## 2023-10-31 NOTE — TELEPHONE ENCOUNTER
----- Message from Lesvia Danyel sent at 10/31/2023  2:07 PM CDT -----  Regarding: pharmacy auth  Contact: [facility] with cover my meds  Type:  Pharmacy Calling to Clarify an RX    Name of Caller:  [facility]  Pharmacy Name:  cover my meds  Prescription Name:  semaglutide (OZEMPIC) 1 mg/dose (4 mg/3 mL)  What do they need to clarify?:  prior auth  Best Call Back Number:  [phone]  Additional Information:  Please call [facility] to advise.  Thanks!

## 2023-11-01 ENCOUNTER — OFFICE VISIT (OUTPATIENT)
Dept: URGENT CARE | Facility: CLINIC | Age: 53
End: 2023-11-01
Payer: COMMERCIAL

## 2023-11-01 VITALS
WEIGHT: 231 LBS | BODY MASS INDEX: 38.49 KG/M2 | SYSTOLIC BLOOD PRESSURE: 116 MMHG | RESPIRATION RATE: 16 BRPM | HEART RATE: 72 BPM | OXYGEN SATURATION: 98 % | HEIGHT: 65 IN | DIASTOLIC BLOOD PRESSURE: 79 MMHG | TEMPERATURE: 98 F

## 2023-11-01 DIAGNOSIS — H10.9 CONJUNCTIVITIS, UNSPECIFIED CONJUNCTIVITIS TYPE, UNSPECIFIED LATERALITY: Primary | ICD-10-CM

## 2023-11-01 DIAGNOSIS — H10.12 ALLERGIC CONJUNCTIVITIS OF LEFT EYE: ICD-10-CM

## 2023-11-01 PROBLEM — H10.10 ALLERGIC CONJUNCTIVITIS: Status: ACTIVE | Noted: 2023-11-01

## 2023-11-01 PROBLEM — H10.10 ALLERGIC CONJUNCTIVITIS: Status: RESOLVED | Noted: 2023-11-01 | Resolved: 2023-11-01

## 2023-11-01 PROCEDURE — 99214 OFFICE O/P EST MOD 30 MIN: CPT | Mod: S$GLB,,, | Performed by: INTERNAL MEDICINE

## 2023-11-01 PROCEDURE — 99214 PR OFFICE/OUTPT VISIT, EST, LEVL IV, 30-39 MIN: ICD-10-PCS | Mod: S$GLB,,, | Performed by: INTERNAL MEDICINE

## 2023-11-01 RX ORDER — TOBRAMYCIN 3 MG/ML
1 SOLUTION/ DROPS OPHTHALMIC EVERY 6 HOURS
Qty: 5 ML | Refills: 0 | Status: SHIPPED | OUTPATIENT
Start: 2023-11-01 | End: 2023-11-06

## 2023-11-01 NOTE — PATIENT INSTRUCTIONS
Cool compresses frequently    If your condition worsens we recommend that you receive another evaluation at the emergency room immediately or contact your primary medical clinics after hours call service to discuss your concerns. You must understand that you've received an Urgent Care treatment only and that you may be released before all of your medical problems are known or treated. You, the patient, will arrange for follow up care as instructed.  Drink plenty of Fluids  Wash hands frequently using mild antibacterial soap lathering for at least 15 seconds then rinse  Get plenty of Rest  Follow up in 1-2 weeks with Primary Care physician if not significantly better.   If you are not allergic please take Tylenol every 4-6 hours as needed and/or Ibuprofen every 6-8 hours as needed, over the counter for pain or fever.

## 2023-11-01 NOTE — PROGRESS NOTES
"Subjective:      Patient ID: Flor Feliz is a 53 y.o. female.    Vitals:  height is 5' 5" (1.651 m) and weight is 104.8 kg (231 lb). Her temporal temperature is 97.5 °F (36.4 °C). Her blood pressure is 116/79 and her pulse is 72. Her respiration is 16 and oxygen saturation is 98%.     Chief Complaint: Eye Problem    Left eye redness, itching around the eye, watery eye and swelling to the lid that started 2 nights ago   Patient thinks she may be having a reaction to her make up   Patient has not had any medications for relief     Eye Problem   The left eye is affected. This is a new problem. The current episode started in the past 7 days. The problem occurs constantly. The problem has been gradually worsening. There was no injury mechanism. The pain is at a severity of 4/10. Associated symptoms include an eye discharge, eye redness and itching. Pertinent negatives include no blurred vision, double vision, fever, foreign body sensation, nausea, photophobia, recent URI or vomiting. She has tried water for the symptoms. The treatment provided no relief.       Constitution: Negative for fever.   Eyes:  Positive for eye discharge, eye itching and eye redness. Negative for photophobia, double vision and blurred vision.   Gastrointestinal:  Negative for nausea and vomiting.      Objective:     Physical Exam   Constitutional: She is oriented to person, place, and time. She appears well-developed. She is cooperative.  Non-toxic appearance. She does not appear ill. No distress.   HENT:   Head: Normocephalic and atraumatic.   Ears:   Right Ear: Hearing, tympanic membrane, external ear and ear canal normal.   Left Ear: Hearing, tympanic membrane, external ear and ear canal normal.   Nose: Nose normal. No mucosal edema, rhinorrhea or nasal deformity. No epistaxis. Right sinus exhibits no maxillary sinus tenderness and no frontal sinus tenderness. Left sinus exhibits no maxillary sinus tenderness and no frontal sinus " tenderness.   Mouth/Throat: Uvula is midline, oropharynx is clear and moist and mucous membranes are normal. No trismus in the jaw. Normal dentition. No uvula swelling. No oropharyngeal exudate, posterior oropharyngeal edema or posterior oropharyngeal erythema.   Eyes: Lids are normal. Right eye exhibits chemosis. Left eye exhibits chemosis. Right conjunctiva is injected. Left conjunctiva is injected. No scleral icterus.       Neck: Trachea normal and phonation normal. Neck supple. No edema present. No erythema present. No neck rigidity present.   Cardiovascular: Normal rate, regular rhythm, normal heart sounds and normal pulses.   Pulmonary/Chest: Effort normal and breath sounds normal. No respiratory distress. She has no decreased breath sounds. She has no rhonchi.   Abdominal: Normal appearance.   Musculoskeletal: Normal range of motion.         General: No deformity. Normal range of motion.   Neurological: She is alert and oriented to person, place, and time. She exhibits normal muscle tone. Coordination normal.   Skin: Skin is warm, dry, intact, not diaphoretic and not pale.   Psychiatric: Her speech is normal and behavior is normal. Judgment and thought content normal.   Nursing note and vitals reviewed.      Assessment:     1. Conjunctivitis, unspecified conjunctivitis type, unspecified laterality    2. Allergic conjunctivitis of left eye        Plan:       Conjunctivitis, unspecified conjunctivitis type, unspecified laterality  -     tobramycin sulfate 0.3% (TOBREX) 0.3 % ophthalmic solution; Place 1 drop into both eyes every 6 (six) hours. for 5 days  Dispense: 5 mL; Refill: 0    Allergic conjunctivitis of left eye      Patient Instructions   Cool compresses frequently    If your condition worsens we recommend that you receive another evaluation at the emergency room immediately or contact your primary medical clinics after hours call service to discuss your concerns. You must understand that you've  received an Urgent Care treatment only and that you may be released before all of your medical problems are known or treated. You, the patient, will arrange for follow up care as instructed.  Drink plenty of Fluids  Wash hands frequently using mild antibacterial soap lathering for at least 15 seconds then rinse  Get plenty of Rest  Follow up in 1-2 weeks with Primary Care physician if not significantly better.   If you are not allergic please take Tylenol every 4-6 hours as needed and/or Ibuprofen every 6-8 hours as needed, over the counter for pain or fever.    My notes were dictated with [employer]. Any misspellings or nonsensical grammar should be attributed to its use and allowances made for errors and typographic syntactical error(s).

## 2023-11-15 ENCOUNTER — PATIENT MESSAGE (OUTPATIENT)
Dept: FAMILY MEDICINE | Facility: CLINIC | Age: 53
End: 2023-11-15
Payer: COMMERCIAL

## 2023-11-15 DIAGNOSIS — I10 ESSENTIAL HYPERTENSION: ICD-10-CM

## 2023-11-15 RX ORDER — LISINOPRIL 20 MG/1
20 TABLET ORAL 2 TIMES DAILY
Qty: 180 TABLET | Refills: 1 | Status: SHIPPED | OUTPATIENT
Start: 2023-11-15

## 2023-11-15 NOTE — TELEPHONE ENCOUNTER
No care due was identified.  [facility] Embedded Care Due Messages. Reference number: [id number].   11/15/2023 8:11:08 AM CST

## 2024-01-23 DIAGNOSIS — I10 ESSENTIAL HYPERTENSION: ICD-10-CM

## 2024-01-23 RX ORDER — HYDROCHLOROTHIAZIDE 12.5 MG/1
CAPSULE ORAL
Qty: 90 CAPSULE | Refills: 3 | Status: SHIPPED | OUTPATIENT
Start: 2024-01-23

## 2024-01-23 NOTE — TELEPHONE ENCOUNTER
Care Due:                  Date            Visit Type   Department     Provider  --------------------------------------------------------------------------------                                MYCHART                              FOLLOWUP/OF  [facility] FAM[facility]  Last Visit: 10-      FICE VISIT   MEDICINE       Jnoo Wasserman                              EP -                              PRIMARY      [facility]  Next Visit: 04-      CARE ([facility])   MEDICINE       Jono Wasserman                                                            Last  Test          Frequency    Reason                     Performed    Due Date  --------------------------------------------------------------------------------    HBA1C.......  6 months...  semaglutide..............  03-   09-    [facility] Embedded Care Due Messages. Reference number: [id number].   1/23/2024 1:05:35 PM CST

## 2024-01-23 NOTE — TELEPHONE ENCOUNTER
Provider Staff:  Action required for this patient    Requires labs      Please see care gap opportunities below in Care Due Message.    Thanks!  [facility]     Appointments      Date Provider   Last Visit   10/27/2023 Jono Wasserman MD   Next Visit   1/23/2024 Jono Wasserman MD     Refill Decision Note   Flor Feliz  is requesting a refill authorization.  Brief Assessment and Rationale for Refill:  Approve     Medication Therapy Plan:         Comments:     Note composed:1:57 PM 01/23/2024

## 2024-02-09 DIAGNOSIS — F41.1 GAD (GENERALIZED ANXIETY DISORDER): ICD-10-CM

## 2024-02-09 NOTE — TELEPHONE ENCOUNTER
Refill Routing Note   Medication(s) are not appropriate for processing by [facility] for the following reason(s):        Clarification of medication (Rx) details    [facility] action(s):  Defer        Medication Therapy Plan: 1/2 tab po daily x 2 wks; then 1 tab po daily old directions; pended for once daily      Appointments  past 12m or future 3m with PCP    Date Provider   Last Visit   10/27/2023 Jono Wasserman MD   Next Visit   4/25/2024 Jono Wasserman MD   ED visits in past 90 days: 0        Note composed:10:04 AM 02/09/2024

## 2024-02-09 NOTE — TELEPHONE ENCOUNTER
No care due was identified.  [facility] Embedded Care Due Messages. Reference number: [id number].   2/09/2024 6:12:47 AM CST

## 2024-02-09 NOTE — TELEPHONE ENCOUNTER
Please approve for sertraline (ZOLOFT)     Last OV 10/27/23  Last refill date 9/28/23  Last labs --  30x0r  Next appt 4/25/24

## 2024-02-11 RX ORDER — SERTRALINE HYDROCHLORIDE 50 MG/1
TABLET, FILM COATED ORAL
Qty: 90 TABLET | Refills: 2 | Status: SHIPPED | OUTPATIENT
Start: 2024-02-11

## 2024-02-29 ENCOUNTER — NURSE TRIAGE (OUTPATIENT)
Dept: ADMINISTRATIVE | Facility: CLINIC | Age: 54
End: 2024-02-29
Payer: COMMERCIAL

## 2024-03-01 NOTE — TELEPHONE ENCOUNTER
"Pt states recently out of town in [place], returned home tonight. Pt states Fell and hit head on "hard surface" approx 4 days ago. Pt states forehead swelling above right eye, turned to black eye, now left eye is turning black. Pt reports Headache off and on Since hitting head. Pt Unsure if loss consciousness. Per protocol, go to ED now. Pt verbalizes understanding and advised to call back for any further questions or concerns.   Reason for Disposition   [1] One or two "black eyes" (bruising, purple color of eyelids) AND [2] onset within 24 hours of head injury    Additional Information   Negative: [1] ACUTE NEURO SYMPTOM AND [2] present now  (DEFINITION: difficult to awaken OR confused thinking and talking OR slurred speech OR weakness of arms OR unsteady walking)   Negative: Knocked out (unconscious) > 1 minute   Negative: Seizure (convulsion) occurred  (Exception: Prior history of seizures and now alert and without Acute Neuro Symptoms.)   Negative: Penetrating head injury (e.g., knife, gun shot wound, metal object)   Negative: [1] Major bleeding (e.g., actively dripping or spurting) AND [2] can't be stopped   Negative: [1] Dangerous mechanism of injury (e.g., MVA, diving, trampoline, contact sports, fall > 10 feet or 3 meters) AND [2] NECK pain AND [3] began < 1 hour after injury   Negative: Sounds like a life-threatening emergency to the triager   Negative: Can't remember what happened (amnesia)   Negative: Vomiting once or more   Negative: [1] Loss of vision or double vision AND [2] present now   Negative: Watery or blood-tinged fluid dripping from the NOSE or EARS now  (Exception: Tears from crying or nosebleed from nasal trauma.)    Protocols used: Head Injury-A-AH    "

## 2024-03-07 ENCOUNTER — OFFICE VISIT (OUTPATIENT)
Dept: FAMILY MEDICINE | Facility: CLINIC | Age: 54
End: 2024-03-07
Payer: COMMERCIAL

## 2024-03-07 VITALS
WEIGHT: 232.13 LBS | BODY MASS INDEX: 37.31 KG/M2 | DIASTOLIC BLOOD PRESSURE: 76 MMHG | SYSTOLIC BLOOD PRESSURE: 122 MMHG | HEIGHT: 66 IN

## 2024-03-07 DIAGNOSIS — S00.83XA FACIAL CONTUSION, INITIAL ENCOUNTER: ICD-10-CM

## 2024-03-07 DIAGNOSIS — S06.0X0A CONCUSSION WITHOUT LOSS OF CONSCIOUSNESS, INITIAL ENCOUNTER: Primary | ICD-10-CM
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
## 2024-03-07 DIAGNOSIS — E66.01 SEVERE OBESITY (BMI 35.0-39.9) WITH COMORBIDITY: ICD-10-CM

## 2024-03-07 PROCEDURE — 3078F DIAST BP <80 MM HG: CPT | Mod: CPTII,S$GLB,, | Performed by: NURSE PRACTITIONER

## 2024-03-07 PROCEDURE — 1159F MED LIST DOCD IN RCRD: CPT | Mod: CPTII,S$GLB,, | Performed by: NURSE PRACTITIONER

## 2024-03-07 PROCEDURE — 1160F RVW MEDS BY RX/DR IN RCRD: CPT | Mod: CPTII,S$GLB,, | Performed by: NURSE PRACTITIONER

## 2024-03-07 PROCEDURE — 4010F ACE/ARB THERAPY RXD/TAKEN: CPT | Mod: CPTII,S$GLB,, | Performed by: NURSE PRACTITIONER

## 2024-03-07 PROCEDURE — 3008F BODY MASS INDEX DOCD: CPT | Mod: CPTII,S$GLB,, | Performed by: NURSE PRACTITIONER

## 2024-03-07 PROCEDURE — 99214 OFFICE O/P EST MOD 30 MIN: CPT | Mod: S$GLB,,, | Performed by: NURSE PRACTITIONER

## 2024-03-07 PROCEDURE — 99999 PR PBB SHADOW E&M-EST. PATIENT-LVL III: CPT | Mod: PBBFAC,,, | Performed by: NURSE PRACTITIONER

## 2024-03-07 PROCEDURE — 3074F SYST BP LT 130 MM HG: CPT | Mod: CPTII,S$GLB,, | Performed by: NURSE PRACTITIONER

## 2024-03-07 NOTE — PROGRESS NOTES
THIS DOCUMENT WAS MADE IN PART WITH VOICE RECOGNITION SOFTWARE.  OCCASIONALLY THIS SOFTWARE WILL MISINTERPRET WORDS OR PHRASES.     Assessment and Plan:  Flor was seen today for concussion.    Diagnoses and all orders for this visit:    Concussion without loss of consciousness, initial encounter    Facial contusion, initial encounter    Fall, initial encounter    Severe obesity (BMI 35.0-39.9) with comorbidity     symptoms improving.  Advised to resume physical activity slowly, as tolerated.   Advised on symptomatic treatment.  She will apply cold compresses to help with swelling and bruising.  Tylenol as needed for headaches.  Emergent conditions discussed.  Patient has follow up with PCP next month.  May return sooner for any concerns    Follow up if symptoms worsen or fail to improve.    ______________________________________________________________________  Subjective:    Chief Complaint:  Follow up after being seen in the ER at [facility]    HPI:  Flor is a 53 y.o. year old female here to follow up after being seen in the ER at [facility] on March 1st   Patient was evaluated in the emergency department for right-sided headaches that began after she sustained a trip and fall 4 days prior.  She tripped over luggage and struck her head on a cabinet. CT head and C-spine was unremarkable.  She was diagnosed with a concussion and contusion of her face.  Patient was advised to follow up with her PCP.   Patient reports that she is still having some headaches and fatigue.  She reports gradual reduction in symptoms.. She reports contusion to forehead is still slightly tender to touch and swollen. The bruising to forehead and under eyes has improved.  Taking Tylenol as needed for headache.  Patient denies visual disturbance, dizziness and confusion.  She reports slight nausea initially, but has since resolved.     Medications:  Current Outpatient Medications on File Prior to Visit   Medication Sig Dispense Refill     fluticasone propionate (FLONASE) 50 mcg/actuation nasal spray 1 spray (50 mcg total) by Each Nostril route 2 (two) times daily as needed for Rhinitis or Allergies. 16 g 6    hydroCHLOROthiazide (MICROZIDE) 12.5 mg capsule TAKE 1 CAPSULE(12.5 MG) BY MOUTH EVERY DAY 90 capsule 3    lisinopriL (PRINIVIL,ZESTRIL) 20 MG tablet Take 1 tablet (20 mg total) by mouth 2 (two) times daily. 180 tablet 1    metoprolol succinate (TOPROL-XL) 25 MG 24 hr tablet Take 1 tablet (25 mg total) by mouth every evening. 90 tablet 2    phentermine-topiramate (QSYMIA) 15-92 mg CM24 Take 1 capsule by mouth once daily. 30 capsule 5    progesterone (PROMETRIUM) 200 MG capsule Take 200 mg by mouth. 12 day monthly      rosuvastatin (CRESTOR) 5 MG tablet Take 1 tablet (5 mg total) by mouth once daily. 90 tablet 3    sertraline (ZOLOFT) 50 MG tablet Take 1 tablet by mouth once daily 90 tablet 2    cetirizine 10 mg chewable tablet Take 10 mg by mouth once daily.      ondansetron (ZOFRAN-ODT) 4 MG TbDL Take 1 tablet (4 mg total) by mouth every 6 (six) hours as needed. (Patient not taking: Reported on 3/7/2024) 30 tablet 0    [DISCONTINUED] cholecalciferol, vitamin D3, 1,250 mcg (50,000 unit) capsule Take 50,000 Units by mouth every 7 days.      [DISCONTINUED] testosterone and estradiol cyp (ESTRADIOL-TESTOSTERONE IM) Inject into the muscle. Pellets Q 3 Month - Gyn Dr De La O       No current facility-administered medications on file prior to visit.       Review of Systems:  Review of Systems   Constitutional:  Positive for fatigue. Negative for fever.   Neurological:  Positive for headaches. Negative for dizziness, syncope, speech difficulty, weakness, light-headedness and numbness.   All other systems reviewed and are negative.      Past Medical History:  Past Medical History:   Diagnosis Date    GERD (gastroesophageal reflux disease)     Hypertension     Kidney stones     Mixed hyperlipidemia     Paroxysmal atrial fibrillation   "      Objective:    Vitals:  Vitals:    03/07/24 0905   BP: 122/76   Weight: 105.3 kg (232 lb 2.3 oz)   Height: 5' 6" (1.676 m)   PainSc: 0-No pain       Physical Exam  Vitals reviewed.   Constitutional:       Appearance: Normal appearance. She is obese.   HENT:      Head: Normocephalic and atraumatic.        Comments: Slight ecchymosis and contusion noted to right side of  forehead, ecchymosis noted below bilateral eyes.      Right Ear: Tympanic membrane, ear canal and external ear normal.      Left Ear: Tympanic membrane, ear canal and external ear normal.      Nose: Nose normal.      Mouth/Throat:      Mouth: Mucous membranes are moist.      Pharynx: Oropharynx is clear.   Eyes:      Extraocular Movements: Extraocular movements intact.      Conjunctiva/sclera:      Right eye: No hemorrhage.     Left eye: No hemorrhage.     Pupils: Pupils are equal, round, and reactive to light.   Cardiovascular:      Rate and Rhythm: Normal rate and regular rhythm.   Pulmonary:      Effort: Pulmonary effort is normal. No respiratory distress.      Breath sounds: Normal breath sounds.   Musculoskeletal:      Cervical back: Full passive range of motion without pain, normal range of motion and neck supple.   Lymphadenopathy:      Cervical: No cervical adenopathy.   Neurological:      General: No focal deficit present.      Mental Status: She is alert and oriented to person, place, and time.      Cranial Nerves: No cranial nerve deficit.   Psychiatric:         Mood and Affect: Mood normal.         Behavior: Behavior normal.         Thought Content: Thought content normal.         Data:           Medical history reviewed, Medications reconciled.          ER summary and diagnostic studies reviewed in detail with patient during today's visit.        I spent a total of 30 minutes on the day of the visit.  This includes face to face time and non-face to face time preparing to see the patient (eg, review of tests), obtaining and/or " reviewing separately obtained history, documenting clinical information in the electronic or other health record, independently interpreting results and communicating results to the patient/family/caregiver, or care coordinator.     Abimbola Calderon, MELANIEP-C  Family Medicine

## 2024-04-05 DIAGNOSIS — R00.2 HEART PALPITATIONS: ICD-10-CM

## 2024-04-05 DIAGNOSIS — I10 ESSENTIAL HYPERTENSION: ICD-10-CM

## 2024-04-05 NOTE — TELEPHONE ENCOUNTER
No care due was identified.  [facility] Embedded Care Due Messages. Reference number: [id number].   4/05/2024 2:53:28 PM CDT

## 2024-04-06 RX ORDER — METOPROLOL SUCCINATE 25 MG/1
25 TABLET, EXTENDED RELEASE ORAL NIGHTLY
Qty: 90 TABLET | Refills: 1 | Status: SHIPPED | OUTPATIENT
Start: 2024-04-06

## 2024-04-06 NOTE — TELEPHONE ENCOUNTER
Refill Decision Note   Flor Feliz  is requesting a refill authorization.  Brief Assessment and Rationale for Refill:  Approve     Medication Therapy Plan:        Comments:     Note composed:5:04 PM 04/06/2024

## 2024-04-17 ENCOUNTER — PATIENT MESSAGE (OUTPATIENT)
Dept: FAMILY MEDICINE | Facility: CLINIC | Age: 54
End: 2024-04-17
Payer: COMMERCIAL

## 2024-04-22 ENCOUNTER — TELEPHONE (OUTPATIENT)
Dept: FAMILY MEDICINE | Facility: CLINIC | Age: 54
End: 2024-04-22
Payer: COMMERCIAL

## 2024-04-22 NOTE — TELEPHONE ENCOUNTER
----- Message from Ursula Dixon sent at 4/22/2024  8:30 AM CDT -----  Contact: pt  Type:  Needs Medical Advice    Who Called: pt    Would the patient rather a call back or a response via [facility]? Call back    Best Call Back Number: [phone]    Additional Information: pt needs the lab orders sent to [facility] on [address] in [place] so she can have them done before upcoming appt    Please advise  Thanks

## 2024-04-22 NOTE — TELEPHONE ENCOUNTER
Lab orders faxed to [facility] on [address] and changed in Epic. Advised pt that she may wnt to reach out to them the day before to confirm they rec'd these.

## 2024-04-25 ENCOUNTER — OFFICE VISIT (OUTPATIENT)
Dept: FAMILY MEDICINE | Facility: CLINIC | Age: 54
End: 2024-04-25
Payer: COMMERCIAL

## 2024-04-25 VITALS
HEIGHT: 66 IN | BODY MASS INDEX: 38.41 KG/M2 | HEART RATE: 82 BPM | WEIGHT: 239 LBS | SYSTOLIC BLOOD PRESSURE: 122 MMHG | RESPIRATION RATE: 18 BRPM | DIASTOLIC BLOOD PRESSURE: 83 MMHG

## 2024-04-25 DIAGNOSIS — J30.89 ENVIRONMENTAL AND SEASONAL ALLERGIES: ICD-10-CM

## 2024-04-25 DIAGNOSIS — I10 ESSENTIAL HYPERTENSION: Primary | ICD-10-CM

## 2024-04-25 DIAGNOSIS — E78.2 MIXED HYPERLIPIDEMIA: ICD-10-CM

## 2024-04-25 DIAGNOSIS — Z00.00 WELLNESS EXAMINATION: ICD-10-CM

## 2024-04-25 DIAGNOSIS — E66.9 OBESITY (BMI 30-39.9): ICD-10-CM

## 2024-04-25 PROCEDURE — 1160F RVW MEDS BY RX/DR IN RCRD: CPT | Mod: CPTII,S$GLB,, | Performed by: INTERNAL MEDICINE

## 2024-04-25 PROCEDURE — 99999 PR PBB SHADOW E&M-EST. PATIENT-LVL III: CPT | Mod: PBBFAC,,, | Performed by: INTERNAL MEDICINE

## 2024-04-25 PROCEDURE — 3008F BODY MASS INDEX DOCD: CPT | Mod: CPTII,S$GLB,, | Performed by: INTERNAL MEDICINE

## 2024-04-25 PROCEDURE — 3074F SYST BP LT 130 MM HG: CPT | Mod: CPTII,S$GLB,, | Performed by: INTERNAL MEDICINE

## 2024-04-25 PROCEDURE — 99214 OFFICE O/P EST MOD 30 MIN: CPT | Mod: S$GLB,,, | Performed by: INTERNAL MEDICINE

## 2024-04-25 PROCEDURE — 4010F ACE/ARB THERAPY RXD/TAKEN: CPT | Mod: CPTII,S$GLB,, | Performed by: INTERNAL MEDICINE

## 2024-04-25 PROCEDURE — 1159F MED LIST DOCD IN RCRD: CPT | Mod: CPTII,S$GLB,, | Performed by: INTERNAL MEDICINE

## 2024-04-25 PROCEDURE — 3079F DIAST BP 80-89 MM HG: CPT | Mod: CPTII,S$GLB,, | Performed by: INTERNAL MEDICINE

## 2024-04-25 RX ORDER — LISINOPRIL 20 MG/1
20 TABLET ORAL 2 TIMES DAILY
Qty: 180 TABLET | Refills: 2 | Status: SHIPPED | OUTPATIENT
Start: 2024-04-25

## 2024-04-25 RX ORDER — PHENTERMINE AND TOPIRAMATE 11.25; 69 MG/1; MG/1
1 CAPSULE, EXTENDED RELEASE ORAL DAILY
Qty: 30 CAPSULE | Refills: 4 | Status: SHIPPED | OUTPATIENT
Start: 2024-04-25

## 2024-04-25 RX ORDER — FLUTICASONE PROPIONATE 50 MCG
1 SPRAY, SUSPENSION (ML) NASAL 2 TIMES DAILY PRN
Qty: 16 G | Refills: 12 | Status: SHIPPED | OUTPATIENT
Start: 2024-04-25

## 2024-04-25 NOTE — PROGRESS NOTES
Subjective:       Patient ID: Flor Feliz is a 54 y.o. female.    Chief Complaint: Follow-up (6 month f/u)   Follow-up  Pertinent negatives include no chest pain or joint swelling.     Gyn: Dr De La O / delmar ablation. Off since Dec didn't like facial hair.  Prev HRT pellets.    MM/23 + cysts   Colonoscopy:  10/2019 Dr. Ratliff r/c 10 years  Immunizations: Flu:  Tdap:   Shingles: recommend Covid: Y   Smoker:  No      F/u   Lab yesterday [facility] = not back yet.    Ldl 100 her phone        Mixed HLD:  controlled // Rx Crestor 5    Highest 164      HTN: Controlled:   Rx Lisinopril 20 bid,  Metoprolol 25    Anxiety: controlled Rx Zoloft 50                Hx anxiety, irritability, work, easily upset.    Metabolic syndrome BMI 37 & 228, 38/238.  Rx Qsymia 11.25 (higher dose didn't help).    Off not covered Ozempic 1,    ____________________________________________________________________________________________________  Assessment & Plan:  1. Essential hypertension  - lisinopriL (PRINIVIL,ZESTRIL) 20 MG tablet; Take 1 tablet (20 mg total) by mouth 2 (two) times daily.  Dispense: 180 tablet; Refill: 2    2. Environmental and seasonal allergies  - fluticasone propionate (FLONASE) 50 mcg/actuation nasal spray; 1 spray (50 mcg total) by Each Nostril route 2 (two) times daily as needed for Rhinitis or Allergies.  Dispense: 16 g; Refill: 12    3. Mixed hyperlipidemia    4. Obesity (BMI 30-39.9)  - phentermine-topiramate (QSYMIA) 11.25-69 mg CM24; Take 1 capsule by mouth once daily.  Dispense: 30 capsule; Refill: 4    5. Wellness examination  - CBC Without Differential; Future  - Comprehensive Metabolic Panel; Future  - Lipid Panel; Future  - CBC Without Differential  - Comprehensive Metabolic Panel  - Lipid Panel     Essential hypertension  -     lisinopriL (PRINIVIL,ZESTRIL) 20 MG tablet; Take 1 tablet (20 mg total) by mouth 2 (two) times daily.  Dispense: 180 tablet; Refill: 2    Environmental and seasonal  allergies  -     fluticasone propionate (FLONASE) 50 mcg/actuation nasal spray; 1 spray (50 mcg total) by Each Nostril route 2 (two) times daily as needed for Rhinitis or Allergies.  Dispense: 16 g; Refill: 12    Mixed hyperlipidemia    Obesity (BMI 30-39.9)  -     phentermine-topiramate (QSYMIA) 11.25-69 mg CM24; Take 1 capsule by mouth once daily.  Dispense: 30 capsule; Refill: 4    Wellness examination  -     Cancel: CBC Without Differential; Future; Expected date: 04/25/2024  -     Cancel: Comprehensive Metabolic Panel; Future; Expected date: 04/25/2024  -     Cancel: Lipid Panel; Future; Expected date: 04/25/2024  -     CBC Without Differential; Future; Expected date: 04/25/2024  -     Comprehensive Metabolic Panel; Future; Expected date: 04/25/2024  -     Lipid Panel; Future; Expected date: 04/25/2024        Continue to work on regular exercise, maintain healthy weight, balanced diet. Avoid unhealthy habits: smoking, excessive alcohol intake.     Disclaimer: This note was partly generated using dictation software which may occasionally result in transcription errors  ____________________________________________________________________________________________________  Review of Systems:  Review of Systems   Constitutional:  Negative for appetite change.   HENT:  Negative for nosebleeds.    Eyes:  Negative for pain.   Respiratory:  Negative for choking.    Cardiovascular:  Negative for chest pain.   Gastrointestinal:  Negative for blood in stool.   Genitourinary:  Negative for hematuria.   Musculoskeletal:  Negative for joint swelling.   Skin:  Negative for pallor.   Neurological:  Negative for facial asymmetry.   Hematological:  Does not bruise/bleed easily.   Psychiatric/Behavioral:  Negative for confusion.        Objective:     Wt Readings from Last 3 Encounters:   04/25/24 108.4 kg (238 lb 15.7 oz)   03/07/24 105.3 kg (232 lb 2.3 oz)   02/29/24 107.8 kg (237 lb 10.5 oz)     BP Readings from Last 3  "Encounters:   04/25/24 122/83   03/07/24 122/76   03/01/24 135/79       Lab Results   Component Value Date    WBC 9.6 10/06/2023    HGB 15.5 10/06/2023    HCT 46.0 10/06/2023     10/06/2023     10/06/2023    K 4.4 10/06/2023     10/06/2023    ALT 13 10/06/2023    AST 14 10/06/2023    CO2 23 10/06/2023    CREATININE 0.82 10/06/2023    BUN 17 10/06/2023    GLU 80 10/06/2023      Hemoglobin A1c   Date Value Ref Range Status   03/10/2023 5.4 4.8 - 5.6 % Final     Comment:              Prediabetes: 5.7 - 6.4           Diabetes: >6.4           Glycemic control for adults with diabetes: <7.0     06/22/2021 5.5 4.8 - 5.6 % Final     Comment:              Prediabetes: 5.7 - 6.4           Diabetes: >6.4           Glycemic control for adults with diabetes: <7.0        Lab Results   Component Value Date    TSH 0.897 03/10/2023    TSH 1.480 07/18/2022    TSH 1.080 06/22/2021     No results found for: "FREET4"  Lab Results   Component Value Date    LDLCALC 91 10/06/2023    LDLCALC 164 (H) 03/10/2023    LDLCALC 156 (H) 07/18/2022     Lab Results   Component Value Date    TRIG 86 10/06/2023    TRIG 73 03/10/2023    TRIG 70 07/18/2022            Physical Exam  Constitutional:       Appearance: Normal appearance.   HENT:      Head: Normocephalic and atraumatic.   Eyes:      Extraocular Movements: Extraocular movements intact.      Pupils: Pupils are equal, round, and reactive to light.   Cardiovascular:      Rate and Rhythm: Normal rate.   Pulmonary:      Effort: Pulmonary effort is normal.   Neurological:      Mental Status: She is alert and oriented to person, place, and time.   Psychiatric:         Mood and Affect: Mood normal.         Medication List with Changes/Refills   New Medications    PHENTERMINE-TOPIRAMATE (QSYMIA) 11.25-69 MG CM24    Take 1 capsule by mouth once daily.   Current Medications    CETIRIZINE 10 MG CHEWABLE TABLET    Take 10 mg by mouth once daily.    HYDROCHLOROTHIAZIDE (MICROZIDE) 12.5 MG " CAPSULE    TAKE 1 CAPSULE(12.5 MG) BY MOUTH EVERY DAY    METOPROLOL SUCCINATE (TOPROL-XL) 25 MG 24 HR TABLET    TAKE 1 TABLET(25 MG) BY MOUTH EVERY EVENING    ONDANSETRON (ZOFRAN-ODT) 4 MG TBDL    Take 1 tablet (4 mg total) by mouth every 6 (six) hours as needed.    PROGESTERONE (PROMETRIUM) 200 MG CAPSULE    Take 200 mg by mouth. 12 day monthly    ROSUVASTATIN (CRESTOR) 5 MG TABLET    Take 1 tablet (5 mg total) by mouth once daily.    SERTRALINE (ZOLOFT) 50 MG TABLET    Take 1 tablet by mouth once daily   Changed and/or Refilled Medications    Modified Medication Previous Medication    FLUTICASONE PROPIONATE (FLONASE) 50 MCG/ACTUATION NASAL SPRAY fluticasone propionate (FLONASE) 50 mcg/actuation nasal spray       1 spray (50 mcg total) by Each Nostril route 2 (two) times daily as needed for Rhinitis or Allergies.    1 spray (50 mcg total) by Each Nostril route 2 (two) times daily as needed for Rhinitis or Allergies.    LISINOPRIL (PRINIVIL,ZESTRIL) 20 MG TABLET lisinopriL (PRINIVIL,ZESTRIL) 20 MG tablet       Take 1 tablet (20 mg total) by mouth 2 (two) times daily.    Take 1 tablet (20 mg total) by mouth 2 (two) times daily.   Discontinued Medications    PHENTERMINE-TOPIRAMATE (QSYMIA) 15-92 MG CM24    Take 1 capsule by mouth once daily.

## 2024-10-01 DIAGNOSIS — R00.2 HEART PALPITATIONS: ICD-10-CM

## 2024-10-01 DIAGNOSIS — I10 ESSENTIAL HYPERTENSION: ICD-10-CM

## 2024-10-01 RX ORDER — METOPROLOL SUCCINATE 25 MG/1
25 TABLET, EXTENDED RELEASE ORAL NIGHTLY
Qty: 90 TABLET | Refills: 1 | OUTPATIENT
Start: 2024-10-01

## 2024-10-01 NOTE — TELEPHONE ENCOUNTER
Refill Decision Note   Flor Feliz  is requesting a refill authorization.  Brief Assessment and Rationale for Refill:  Quick Discontinue     Medication Therapy Plan:         Comments:     Note composed:1:42 PM 10/01/2024

## 2024-10-01 NOTE — TELEPHONE ENCOUNTER
No care due was identified.  [facility] Embedded Care Due Messages. Reference number: [id number].   10/01/2024 6:24:55 AM CDT

## 2024-10-17 ENCOUNTER — OFFICE VISIT (OUTPATIENT)
Dept: URGENT CARE | Facility: CLINIC | Age: 54
End: 2024-10-17
Payer: COMMERCIAL

## 2024-10-17 ENCOUNTER — HOSPITAL ENCOUNTER (OUTPATIENT)
Dept: RADIOLOGY | Facility: HOSPITAL | Age: 54
Discharge: HOME | End: 2024-10-17
Attending: EMERGENCY MEDICINE
Payer: COMMERCIAL

## 2024-10-17 ENCOUNTER — TELEPHONE (OUTPATIENT)
Dept: URGENT CARE | Facility: CLINIC | Age: 54
End: 2024-10-17

## 2024-10-17 VITALS
RESPIRATION RATE: 17 BRPM | HEIGHT: 66 IN | BODY MASS INDEX: 36.16 KG/M2 | WEIGHT: 225 LBS | OXYGEN SATURATION: 97 % | SYSTOLIC BLOOD PRESSURE: 127 MMHG | HEART RATE: 70 BPM | DIASTOLIC BLOOD PRESSURE: 75 MMHG | TEMPERATURE: 97 F

## 2024-10-17 DIAGNOSIS — R10.31 ABDOMINAL PAIN, ACUTE, RIGHT LOWER QUADRANT: ICD-10-CM

## 2024-10-17 DIAGNOSIS — R10.31 RIGHT LOWER QUADRANT PAIN: Primary | ICD-10-CM

## 2024-10-17 DIAGNOSIS — R10.9 ABDOMINAL PAIN, UNSPECIFIED ABDOMINAL LOCATION: ICD-10-CM

## 2024-10-17 DIAGNOSIS — R10.31 RIGHT LOWER QUADRANT PAIN: ICD-10-CM

## 2024-10-17 DIAGNOSIS — R11.0 NAUSEA: ICD-10-CM

## 2024-10-17 LAB
BILIRUBIN, UA POC OHS: NEGATIVE
BLOOD, UA POC OHS: NEGATIVE
CLARITY, UA POC OHS: CLEAR
COLOR, UA POC OHS: YELLOW
GLUCOSE, UA POC OHS: NEGATIVE
KETONES, UA POC OHS: NEGATIVE
LEUKOCYTES, UA POC OHS: NEGATIVE
NITRITE, UA POC OHS: NEGATIVE
PH, UA POC OHS: 6
PROTEIN, UA POC OHS: NEGATIVE
SPECIFIC GRAVITY, UA POC OHS: >=1.03
UROBILINOGEN, UA POC OHS: 0.2

## 2024-10-17 PROCEDURE — 74177 CT ABD & PELVIS W/CONTRAST: CPT | Mod: TC

## 2024-10-17 PROCEDURE — 25500020 PHARM REV CODE 255: Performed by: EMERGENCY MEDICINE

## 2024-10-17 PROCEDURE — 81003 URINALYSIS AUTO W/O SCOPE: CPT | Mod: QW,S$GLB,, | Performed by: EMERGENCY MEDICINE

## 2024-10-17 PROCEDURE — 99214 OFFICE O/P EST MOD 30 MIN: CPT | Mod: S$GLB,,, | Performed by: EMERGENCY MEDICINE

## 2024-10-17 PROCEDURE — S0119 ONDANSETRON 4 MG: HCPCS | Mod: S$GLB,,, | Performed by: EMERGENCY MEDICINE

## 2024-10-17 PROCEDURE — 74177 CT ABD & PELVIS W/CONTRAST: CPT | Mod: 26,,, | Performed by: RADIOLOGY

## 2024-10-17 RX ORDER — NAPROXEN 500 MG/1
500 TABLET ORAL 2 TIMES DAILY PRN
Qty: 20 TABLET | Refills: 0 | Status: SHIPPED | OUTPATIENT
Start: 2024-10-17

## 2024-10-17 RX ORDER — ONDANSETRON 4 MG/1
4 TABLET, ORALLY DISINTEGRATING ORAL EVERY 8 HOURS PRN
Qty: 15 TABLET | Refills: 0 | Status: SHIPPED | OUTPATIENT
Start: 2024-10-17

## 2024-10-17 RX ORDER — ONDANSETRON 4 MG/1
4 TABLET, ORALLY DISINTEGRATING ORAL
Status: COMPLETED | OUTPATIENT
Start: 2024-10-17 | End: 2024-10-17

## 2024-10-17 RX ADMIN — ONDANSETRON 4 MG: 4 TABLET, ORALLY DISINTEGRATING ORAL at 08:10

## 2024-10-17 RX ADMIN — IOHEXOL 100 ML: 350 INJECTION, SOLUTION INTRAVENOUS at 12:10

## 2024-10-17 NOTE — TELEPHONE ENCOUNTER
Contacted patient by phone.  Confirmed name and date of birth.  Informed her of CT scan findings including fluid in the endometrial canal and stable left adrenal adenoma.  She understands that the appendix was normal and the colon is normal.  Normal kidneys and no evidence of kidney stone.  I advised her I did send Naprosyn to the pharmacy for p.r.n. use for pain.  I advised her to contact her gynecologist Dr. Kirti MATOS to discuss need for follow-up for the finding of endometrial cavity fluid.  She says she will call her gynecologist immediately to discuss need for follow-up.

## 2024-10-17 NOTE — PATIENT INSTRUCTIONS
"Get labs drawn ASAP at any of the following:  For lab or Xray testing ordered to be completed after your visit, please proceed to either of the followin) [facility] at [address], [address] in [place]  (7a-5p M-F)  2) [facility] - [address] in [place] (M-F)  3) [facility] - [address] [facility] [address] facility (M-F)  4) [facility] - [address] in [place] - (7a-6p M- and 7a-1p Saturday)    The orders are already in the system. You do not have to bring anything with you except your photo ID. Once the test is complete, you are ok to leave the facility. You will be contacted by phone or patient portal with results and any new instructions.     Your CT scan has also been scheduled for 1pm - YOU MUST ARRIVE BY 11AM TO START DRINKING CONTRAST.     Start with small sips of Gatorade, Powerade, and Pedialyte or try "Liquid IV" products. Advance diet as tolerated, starting with saltine crackers, clear soup broths, and Jello.     Consider a daily probiotic such as Culturelle or Align.     For any diarrhea that lasts longer than 7 days or becomes bloody at any time, you must get rechecked to see if stool studies are needed.     For any sudden or severe abdominal pain or severe weakness, we recommend evaluation at the hospital ER.    You must understand that you've received an Urgent Care treatment only and that you may be released before all your medical problems are known or treated. You, the patient, will arrange for follow up care as instructed.    Follow up with your PCP or specialty clinic as directed if not improved or as needed. You can call [phone] to schedule an appointment with the appropriate provider.      You, the patient, will arrange for follow up care as instructed.     If your condition worsens or fails to improve we recommend that you receive another evaluation at the ER immediately or contact your PCP to " discuss your concerns.     Patient aware of treatment plan and verbalized understanding.

## 2024-10-17 NOTE — PROGRESS NOTES
"Subjective:      Patient ID: Flor Feliz is a 54 y.o. female.    Vitals:  height is 5' 6" (1.676 m) and weight is 102.1 kg (225 lb). Her oral temperature is 97.3 °F (36.3 °C). Her blood pressure is 127/75 and her pulse is 70. Her respiration is 17 and oxygen saturation is 97%.     Chief Complaint: Abdominal Pain    Patient is here today for abdominal pain. Patient states her symptoms have been going on for a couple of weeks on and off, initially thought it was pain in hip joint , but yesterday pain is more in right lower abdomen and she states it woke her up out her sleep and it has been hurting every since. Significant increase in pain in past 24 hours. +Nausea. No vomiting. Normal bowel movements daily without visible blood. Prior cholecystectomy. No vaginal bleeding.  She states she hs tried advil with mild relief.    Reports pelvic ultrasound per Dr. Garrett 1 month ago that showed improving uterine fibroid, no reported ovarian pathology at that time. I cannot see that report to confirm.     Last colonoscopy about 5 years ago and normal per patient.     Abdominal Pain  This is a new problem. The current episode started yesterday. The onset quality is undetermined. The problem occurs constantly. The problem has been gradually worsening. The pain is located in the generalized abdominal region and RLQ. The pain is at a severity of 8/10. The pain is moderate. The quality of the pain is aching, tearing and sharp. The abdominal pain radiates to the back. Associated symptoms include nausea. Pertinent negatives include no arthralgias, constipation, diarrhea, fever, headaches, myalgias or vomiting. Associated symptoms comments: Chills  . The pain is aggravated by movement. The pain is relieved by Certain positions. She has tried acetaminophen for the symptoms. The treatment provided mild relief.       Constitution: Positive for chills. Negative for sweating, fatigue, fever and unexpected weight change.   HENT: "  Negative for ear pain, congestion, sore throat, trouble swallowing and voice change.    Neck: Negative for neck pain, neck stiffness, painful lymph nodes and neck swelling.   Cardiovascular:  Negative for chest pain, leg swelling, palpitations, sob on exertion and passing out.   Eyes:  Negative for eye pain, eye redness, photophobia, double vision and blurred vision.   Respiratory:  Negative for chest tightness, cough, sputum production, bloody sputum, stridor and wheezing.    Gastrointestinal:  Positive for abdominal pain and nausea. Negative for abdominal bloating, vomiting, constipation, diarrhea and heartburn.   Musculoskeletal:  Negative for joint pain, joint swelling, back pain, muscle cramps and muscle ache.   Skin:  Negative for rash and hives.   Allergic/Immunologic: Negative for hives and itching.   Neurological:  Negative for dizziness, light-headedness, passing out, loss of balance, headaches, altered mental status, loss of consciousness and seizures.   Hematologic/Lymphatic: Negative for swollen lymph nodes.   Psychiatric/Behavioral:  Negative for altered mental status and nervous/anxious. The patient is not nervous/anxious.       Objective:     Physical Exam   Constitutional: She is oriented to person, place, and time. She appears well-developed.  Non-toxic appearance. She appears ill. No distress.   HENT:   Head: Normocephalic and atraumatic.   Ears:   Right Ear: External ear normal.   Left Ear: External ear normal.   Nose: Nose normal.   Mouth/Throat: Mucous membranes are dry.   Eyes: Conjunctivae and lids are normal.   Neck: Trachea normal. Neck supple.   Cardiovascular: Normal rate, regular rhythm and normal heart sounds.   Pulmonary/Chest: Effort normal and breath sounds normal. No respiratory distress.   Abdominal: Normal appearance and bowel sounds are normal. She exhibits no distension and no mass. Soft. There is abdominal tenderness (moderate suprapubic and RLQ TTP, mild TTP LLQ). There is  guarding. There is no rebound, no left CVA tenderness and no right CVA tenderness.   Musculoskeletal: Normal range of motion.         General: Normal range of motion.      Comments: No pain on palpation of right hip; normal ROM without pain to right hip   Neurological: She is alert and oriented to person, place, and time. She has normal strength.   Skin: Skin is warm, dry, intact, not diaphoretic and not pale.   Psychiatric: Her speech is normal and behavior is normal. Judgment and thought content normal.   Nursing note and vitals reviewed.    Results for orders placed or performed in visit on 10/17/24   POCT Urinalysis(Instrument)    Collection Time: 10/17/24  8:27 AM   Result Value Ref Range    Color, POC UA Yellow Yellow, Straw, Colorless    Clarity, POC UA Clear Clear    Glucose, POC UA Negative Negative    Bilirubin, POC UA Negative Negative    Ketones, POC UA Negative Negative    Spec Grav POC UA >=1.030 1.005 - 1.030    Blood, POC UA Negative Negative    pH, POC UA 6.0 5.0 - 8.0    Protein, POC UA Negative Negative    Urobilinogen, POC UA 0.2 <=1.0    Nitrite, POC UA Negative Negative    WBC, POC UA Negative Negative      CT Abdomen Pelvis With IV Contrast Routine Oral Contrast    Result Date: 10/17/2024  CMS MANDATED QUALITY DATA - CT RADIATION - 436 All CT scans at this facility utilize dose modulation, iterative reconstruction, and/or weight based dosing when appropriate to reduce radiation dose to as low as reasonably achievable. EXAMINATION: CT ABDOMEN PELVIS WITH IV CONTRAST CLINICAL HISTORY: RLQ abdominal pain (Age >= 14y); Right lower quadrant pain TECHNIQUE: CT abdomen and pelvis with IV contrast.  100 mL Omnipaque 350. COMPARISON: CT abdomen pelvis 04/10/2021 FINDINGS: There is subsegmental atelectasis of lung bases.  Heart size is normal. Liver is normal size.  There are no enhancing hepatic lesions.  Elongated left hepatic lobe is unchanged.  Gallbladder has been removed.  Common bile duct is  within normal limits.  The pancreas, spleen and right adrenal gland are unremarkable.  Hypoattenuating left adrenal nodule felt to reflect an adrenal adenoma measures 2.2 cm and shows no detrimental change from previous exam. The kidneys are normal size.  There is no hydronephrosis or nephrolithiasis.  The ureters are normal caliber without obstructions.  Bladder is fluid filled with no focal wall abnormalities uterus is anteverted.  There is small amount of fluid in the endometrial canal.  Adnexa is unremarkable.  No free fluid in the pelvis. The large and small bowel normal caliber.  The appendix is normal.  There is no bowel wall thickening or inflammatory changes.  Stomach is decompressed. The abdominal aorta is normal caliber.  The ventral abdominal wall is unremarkable.  There is no acute osseous abnormality.     1. Small amount of fluid noted in the endometrial canal.  Correlate with patient's menopausal state.  Pelvic ultrasound recommended if deemed necessary. 2. Unchanged left adrenal adenoma. Electronically signed by: Joao Brink Date:    10/17/2024 Time:    13:03       Assessment:     1. Right lower quadrant pain    2. Abdominal pain, unspecified abdominal location    3. Abdominal pain, acute, right lower quadrant    4. Nausea        Plan:     Concern for intra-abdominal pathology; Ddx appendicitis/colitis/enteritis. Will get outpatient CT and labs for further evaluation.     Reports normal pelvic US (other than resolving fibroid) about 1 month ago at GYN office. This is not available for my review.     UA WNL.    CT with normal appendix and intestines. There is a small amt of fluid in endometrial canal which is nonspecific. She has occasional vaginal bleeding which is not acute and she attributes to her progesterone. Also has unchanged left adrenal adenoma.     Recommended GYN f/u to discuss fluid in endometrial canal to see if additional workup is needed. Her GYN is Dr. Garrett out of our system.  "Patient will call to schedule.    Right lower quadrant pain  -     CT Abdomen Pelvis With IV Contrast Routine Oral Contrast; Future; Expected date: 10/17/2024  -     naproxen (NAPROSYN) 500 MG tablet; Take 1 tablet (500 mg total) by mouth 2 (two) times daily as needed (pelvic pain).  Dispense: 20 tablet; Refill: 0    Abdominal pain, unspecified abdominal location  -     POCT Urinalysis(Instrument)    Abdominal pain, acute, right lower quadrant  -     CBC W/ AUTO DIFFERENTIAL; Future; Expected date: 10/17/2024  -     COMPREHENSIVE METABOLIC PANEL; Future; Expected date: 10/17/2024  -     naproxen (NAPROSYN) 500 MG tablet; Take 1 tablet (500 mg total) by mouth 2 (two) times daily as needed (pelvic pain).  Dispense: 20 tablet; Refill: 0    Nausea  -     ondansetron (ZOFRAN-ODT) 4 MG TbDL; Take 1 tablet (4 mg total) by mouth every 8 (eight) hours as needed (nausea/vomiting).  Dispense: 15 tablet; Refill: 0  -     ondansetron disintegrating tablet 4 mg      Patient Instructions   Get labs drawn ASAP at any of the following:  For lab or Xray testing ordered to be completed after your visit, please proceed to either of the followin) [facility] at [address] in [place]  (7a-5p -)  2) [facility] - [address] in [place] (M-)  3) [facility] - [address] [facility] facility (-)  4) [facility] - [address] in [place] - (7a-6p - and 7a-1p Saturday)    The orders are already in the system. You do not have to bring anything with you except your photo ID. Once the test is complete, you are ok to leave the facility. You will be contacted by phone or patient portal with results and any new instructions.     Your CT scan has also been scheduled for 1pm - YOU MUST ARRIVE BY 11AM TO START DRINKING CONTRAST.     Start with small sips of Gatorade, Powerade, and Pedialyte or try "Liquid IV" products. Advance diet as " tolerated, starting with saltine crackers, clear soup broths, and Jello.     Consider a daily probiotic such as Culturelle or Align.     For any diarrhea that lasts longer than 7 days or becomes bloody at any time, you must get rechecked to see if stool studies are needed.     For any sudden or severe abdominal pain or severe weakness, we recommend evaluation at the hospital ER.    You must understand that you've received an Urgent Care treatment only and that you may be released before all your medical problems are known or treated. You, the patient, will arrange for follow up care as instructed.    Follow up with your PCP or specialty clinic as directed if not improved or as needed. You can call [phone] to schedule an appointment with the appropriate provider.      You, the patient, will arrange for follow up care as instructed.     If your condition worsens or fails to improve we recommend that you receive another evaluation at the ER immediately or contact your PCP to discuss your concerns.     Patient aware of treatment plan and verbalized understanding.

## 2025-01-29 ENCOUNTER — OFFICE VISIT (OUTPATIENT)
Dept: FAMILY MEDICINE | Facility: CLINIC | Age: 55
End: 2025-01-29
Payer: COMMERCIAL

## 2025-01-29 VITALS
SYSTOLIC BLOOD PRESSURE: 116 MMHG | HEIGHT: 66 IN | OXYGEN SATURATION: 97 % | DIASTOLIC BLOOD PRESSURE: 80 MMHG | WEIGHT: 241.06 LBS | BODY MASS INDEX: 38.74 KG/M2 | HEART RATE: 73 BPM

## 2025-01-29 DIAGNOSIS — R00.2 HEART PALPITATIONS: ICD-10-CM

## 2025-01-29 DIAGNOSIS — Z01.89 ENCOUNTER FOR ROUTINE LABORATORY TESTING: ICD-10-CM

## 2025-01-29 DIAGNOSIS — I10 ESSENTIAL HYPERTENSION: ICD-10-CM

## 2025-01-29 DIAGNOSIS — E78.5 HYPERLIPIDEMIA, UNSPECIFIED HYPERLIPIDEMIA TYPE: ICD-10-CM

## 2025-01-29 DIAGNOSIS — Z00.00 WELLNESS EXAMINATION: Primary | ICD-10-CM

## 2025-01-29 DIAGNOSIS — F41.1 GAD (GENERALIZED ANXIETY DISORDER): ICD-10-CM

## 2025-01-29 DIAGNOSIS — E66.01 SEVERE OBESITY (BMI 35.0-39.9) WITH COMORBIDITY: ICD-10-CM

## 2025-01-29 DIAGNOSIS — R73.9 BLOOD GLUCOSE ELEVATED: ICD-10-CM

## 2025-01-29 DIAGNOSIS — E78.2 MIXED HYPERLIPIDEMIA: ICD-10-CM

## 2025-01-29 PROCEDURE — 3079F DIAST BP 80-89 MM HG: CPT | Mod: CPTII,S$GLB,, | Performed by: INTERNAL MEDICINE

## 2025-01-29 PROCEDURE — 99999 PR PBB SHADOW E&M-EST. PATIENT-LVL IV: CPT | Mod: PBBFAC,,, | Performed by: INTERNAL MEDICINE

## 2025-01-29 PROCEDURE — 1160F RVW MEDS BY RX/DR IN RCRD: CPT | Mod: CPTII,S$GLB,, | Performed by: INTERNAL MEDICINE

## 2025-01-29 PROCEDURE — 3008F BODY MASS INDEX DOCD: CPT | Mod: CPTII,S$GLB,, | Performed by: INTERNAL MEDICINE

## 2025-01-29 PROCEDURE — 3074F SYST BP LT 130 MM HG: CPT | Mod: CPTII,S$GLB,, | Performed by: INTERNAL MEDICINE

## 2025-01-29 PROCEDURE — 1159F MED LIST DOCD IN RCRD: CPT | Mod: CPTII,S$GLB,, | Performed by: INTERNAL MEDICINE

## 2025-01-29 PROCEDURE — 99396 PREV VISIT EST AGE 40-64: CPT | Mod: S$GLB,,, | Performed by: INTERNAL MEDICINE

## 2025-01-29 RX ORDER — TIRZEPATIDE 2.5 MG/.5ML
2.5 INJECTION, SOLUTION SUBCUTANEOUS
Qty: 4 PEN | Refills: 0 | Status: SHIPPED | OUTPATIENT
Start: 2025-01-29

## 2025-01-29 RX ORDER — LISINOPRIL 20 MG/1
20 TABLET ORAL 2 TIMES DAILY
Qty: 180 TABLET | Refills: 2 | Status: CANCELLED | OUTPATIENT
Start: 2025-01-29

## 2025-01-29 RX ORDER — METOPROLOL SUCCINATE 25 MG/1
25 TABLET, EXTENDED RELEASE ORAL NIGHTLY
Qty: 90 TABLET | Refills: 2 | Status: SHIPPED | OUTPATIENT
Start: 2025-01-29

## 2025-01-29 RX ORDER — SERTRALINE HYDROCHLORIDE 50 MG/1
TABLET, FILM COATED ORAL
Qty: 90 TABLET | Refills: 1 | Status: CANCELLED | OUTPATIENT
Start: 2025-01-29

## 2025-01-29 RX ORDER — METOPROLOL SUCCINATE 25 MG/1
25 TABLET, EXTENDED RELEASE ORAL NIGHTLY
Qty: 90 TABLET | Refills: 1 | Status: CANCELLED | OUTPATIENT
Start: 2025-01-29

## 2025-01-29 RX ORDER — ROSUVASTATIN CALCIUM 5 MG/1
5 TABLET, COATED ORAL DAILY
Qty: 90 TABLET | Refills: 3 | Status: CANCELLED | OUTPATIENT
Start: 2025-01-29

## 2025-01-29 RX ORDER — HYDROCHLOROTHIAZIDE 12.5 MG/1
CAPSULE ORAL
Qty: 90 CAPSULE | Refills: 3 | Status: CANCELLED | OUTPATIENT
Start: 2025-01-29

## 2025-01-29 RX ORDER — PHENTERMINE AND TOPIRAMATE 11.25; 69 MG/1; MG/1
1 CAPSULE, EXTENDED RELEASE ORAL EVERY MORNING
Qty: 30 CAPSULE | Refills: 4 | Status: SHIPPED | OUTPATIENT
Start: 2025-01-29

## 2025-01-29 NOTE — PROGRESS NOTES
Subjective:       Patient ID: Flor Feliz is a 54 y.o. female.    Chief Complaint: Follow-up (Med refills)   HPI     History of Present Illness            Gyn: Dr De La O / hx ablation.  E/T q 3m HRT pellets, P 12 days .  // 3-4 M last menses   MM/24   Colonoscopy:  10/2019 Dr. Ratliff r/c 10 yrs  Immunizations: Flu:  Tdap:   Shingles: recommend   Smoker:  No      Wellness   Due labs         Mixed HLD:  controlled // Rx Crestor 5               Highest 164      HTN: Controlled:   Rx Lisinopril 20 bid, Metoprolol 25     Anxiety: controlled Rx Zoloft 50                Hx anxiety, irritability, work, easily upset.     Metabolic syndrome BMI 38/241 increase while off. Restarted recently   Off Rx Qsymia 11.25 (higher dose didn't help).    Off not covered Ozempic 1           ____________________________________________________________________________________________________  Assessment & Plan:  1. Wellness examination  - CBC Without Differential; Future  - Comprehensive Metabolic Panel; Future  - Hemoglobin A1C; Future  - Lipid Panel; Future  - TSH; Future  - CBC Without Differential  - Comprehensive Metabolic Panel  - Hemoglobin A1C  - Lipid Panel  - TSH    2. Mixed hyperlipidemia  - tirzepatide, weight loss, (ZEPBOUND) 2.5 mg/0.5 mL PnIj; Inject 2.5 mg into the skin every 7 days.  Dispense: 4 Pen; Refill: 0  - Lipid Panel; Future  - Lipid Panel    3. Blood glucose elevated  - Hemoglobin A1C; Future  - Hemoglobin A1C    4. Essential hypertension  - tirzepatide, weight loss, (ZEPBOUND) 2.5 mg/0.5 mL PnIj; Inject 2.5 mg into the skin every 7 days.  Dispense: 4 Pen; Refill: 0  - TSH; Future  - TSH    5. TOMA (generalized anxiety disorder)    6. Heart palpitations    7. Encounter for routine laboratory testing    8. Severe obesity (BMI 35.0-39.9) with comorbidity  - tirzepatide, weight loss, (ZEPBOUND) 2.5 mg/0.5 mL PnIj; Inject 2.5 mg into the skin every 7 days.  Dispense: 4 Pen; Refill: 0  -  phentermine-topiramate (QSYMIA) 11.25-69 mg CM24; Take 1 capsule by mouth every morning.  Dispense: 30 capsule; Refill: 4     Wellness examination  -     Cancel: Lipid Panel; Future  -     Cancel: CBC Without Differential; Future; Expected date: 01/29/2025  -     Cancel: Comprehensive Metabolic Panel; Future  -     Cancel: Hemoglobin A1C; Future; Expected date: 01/29/2025  -     CBC Without Differential; Future; Expected date: 01/29/2025  -     Comprehensive Metabolic Panel; Future  -     Hemoglobin A1C; Future; Expected date: 01/29/2025  -     Lipid Panel; Future  -     TSH; Future; Expected date: 01/29/2025    Mixed hyperlipidemia  -     Cancel: Lipid Panel; Future  -     tirzepatide, weight loss, (ZEPBOUND) 2.5 mg/0.5 mL PnIj; Inject 2.5 mg into the skin every 7 days.  Dispense: 4 Pen; Refill: 0  -     Lipid Panel; Future    Blood glucose elevated  Comments:  Check A1c  Orders:  -     Cancel: Hemoglobin A1C; Future; Expected date: 01/29/2025  -     Hemoglobin A1C; Future; Expected date: 01/29/2025    Essential hypertension  -     tirzepatide, weight loss, (ZEPBOUND) 2.5 mg/0.5 mL PnIj; Inject 2.5 mg into the skin every 7 days.  Dispense: 4 Pen; Refill: 0  -     TSH; Future; Expected date: 01/29/2025    TOMA (generalized anxiety disorder)    Heart palpitations    Encounter for routine laboratory testing    Severe obesity (BMI 35.0-39.9) with comorbidity  -     tirzepatide, weight loss, (ZEPBOUND) 2.5 mg/0.5 mL PnIj; Inject 2.5 mg into the skin every 7 days.  Dispense: 4 Pen; Refill: 0  -     phentermine-topiramate (QSYMIA) 11.25-69 mg CM24; Take 1 capsule by mouth every morning.  Dispense: 30 capsule; Refill: 4        Continue to work on maintain healthy weight, balanced diet. Avoid unhealthy habits: smoking/vaping, excessive alcohol intake.     Recommend diet exercise:  High protein, low fat, low carb diet - calories women under <1200 & men <1800, carbs <100 G, protein 50-60 G daily. Protein supplements to replace  "one meal.  Keep all beverages < 10 calories per serving. Keep snacks < 100 calories.   Recommend exercise 160 minutes per week, combo of cardio and weight/strength training.     Visit today included increased complexity associated with the care of the episodic problem addressed and managing the longitudinal care of the patient due to the serious and/or complex managed problem(s). HTN      Disclaimer: This note was partly generated using dictation software which may occasionally result in transcription errors  ____________________________________________________________________________________________________  Review of Systems:  Review of Systems      Negative     Objective:     Wt Readings from Last 3 Encounters:   01/29/25 109.4 kg (241 lb 1.2 oz)   10/17/24 102.1 kg (225 lb)   04/25/24 108.4 kg (238 lb 15.7 oz)     BP Readings from Last 3 Encounters:   01/29/25 116/80   10/17/24 127/75   04/25/24 122/83       Lab Results   Component Value Date    WBC 11.45 10/17/2024    HGB 15.1 10/17/2024    HCT 46.1 10/17/2024     10/17/2024     10/17/2024    K 4.5 10/17/2024     10/17/2024    ALT 15 10/17/2024    AST 22 10/17/2024    CO2 23 10/17/2024    CREATININE 0.67 10/17/2024    BUN 21 (H) 10/17/2024     10/17/2024      Hemoglobin A1c   Date Value Ref Range Status   03/10/2023 5.4 4.8 - 5.6 % Final     Comment:              Prediabetes: 5.7 - 6.4           Diabetes: >6.4           Glycemic control for adults with diabetes: <7.0     06/22/2021 5.5 4.8 - 5.6 % Final     Comment:              Prediabetes: 5.7 - 6.4           Diabetes: >6.4           Glycemic control for adults with diabetes: <7.0        Lab Results   Component Value Date    TSH 0.897 03/10/2023    TSH 1.480 07/18/2022    TSH 1.080 06/22/2021     No results found for: "FREET4"  Lab Results   Component Value Date    LDLCALC 91 10/06/2023    LDLCALC 164 (H) 03/10/2023    LDLCALC 156 (H) 07/18/2022     Lab Results   Component Value Date "    TRIG 86 10/06/2023    TRIG 73 03/10/2023    TRIG 70 07/18/2022            Physical Exam      Constitutional:       Appearance: Normal appearance.   HENT:      Head: Normocephalic and atraumatic.   Eyes:      Extraocular Movements: Extraocular movements intact.      Pupils: Pupils are equal, round, and reactive to light.   Cardiovascular:      Rate and Rhythm: Normal rate.   Pulmonary:      Effort: Pulmonary effort is normal.   Neurological:      Mental Status: She is alert and oriented to person, place, and time.   Psychiatric:         Mood and Affect: Mood normal.     Medication List with Changes/Refills   New Medications    PHENTERMINE-TOPIRAMATE (QSYMIA) 11.25-69 MG CM24    Take 1 capsule by mouth every morning.    TIRZEPATIDE, WEIGHT LOSS, (ZEPBOUND) 2.5 MG/0.5 ML PNIJ    Inject 2.5 mg into the skin every 7 days.   Current Medications    CETIRIZINE 10 MG CHEWABLE TABLET    Take 10 mg by mouth once daily.    FLUTICASONE PROPIONATE (FLONASE) 50 MCG/ACTUATION NASAL SPRAY    1 spray (50 mcg total) by Each Nostril route 2 (two) times daily as needed for Rhinitis or Allergies.    HYDROCHLOROTHIAZIDE (MICROZIDE) 12.5 MG CAPSULE    TAKE 1 CAPSULE(12.5 MG) BY MOUTH EVERY DAY    LISINOPRIL (PRINIVIL,ZESTRIL) 20 MG TABLET    Take 1 tablet (20 mg total) by mouth 2 (two) times daily.    METOPROLOL SUCCINATE (TOPROL-XL) 25 MG 24 HR TABLET    Take 1 tablet (25 mg total) by mouth every evening.    NAPROXEN (NAPROSYN) 500 MG TABLET    Take 1 tablet (500 mg total) by mouth 2 (two) times daily as needed (pelvic pain).    ONDANSETRON (ZOFRAN-ODT) 4 MG TBDL    Take 1 tablet (4 mg total) by mouth every 8 (eight) hours as needed (nausea/vomiting).    PROGESTERONE (PROMETRIUM) 200 MG CAPSULE    Take 200 mg by mouth. 12 day monthly    ROSUVASTATIN (CRESTOR) 5 MG TABLET    Take 1 tablet (5 mg total) by mouth once daily.    SERTRALINE (ZOLOFT) 50 MG TABLET    Take 1 tablet by mouth once daily   Discontinued Medications     PHENTERMINE-TOPIRAMATE (QSYMIA) 7.5-46 MG CM24    Take 1 capsule by mouth Daily.

## 2025-02-17 DIAGNOSIS — I10 ESSENTIAL HYPERTENSION: ICD-10-CM

## 2025-02-17 DIAGNOSIS — E78.5 HYPERLIPIDEMIA, UNSPECIFIED HYPERLIPIDEMIA TYPE: ICD-10-CM

## 2025-02-18 RX ORDER — ROSUVASTATIN CALCIUM 5 MG/1
5 TABLET, COATED ORAL DAILY
Qty: 90 TABLET | Refills: 3 | Status: SHIPPED | OUTPATIENT
Start: 2025-02-18 | End: 2025-02-18 | Stop reason: SDUPTHER

## 2025-02-18 RX ORDER — ROSUVASTATIN CALCIUM 5 MG/1
5 TABLET, COATED ORAL DAILY
Qty: 90 TABLET | Refills: 2 | Status: SHIPPED | OUTPATIENT
Start: 2025-02-18

## 2025-02-18 RX ORDER — HYDROCHLOROTHIAZIDE 12.5 MG/1
CAPSULE ORAL
Qty: 90 CAPSULE | Refills: 2 | Status: SHIPPED | OUTPATIENT
Start: 2025-02-18

## 2025-02-18 RX ORDER — HYDROCHLOROTHIAZIDE 12.5 MG/1
CAPSULE ORAL
Qty: 90 CAPSULE | Refills: 3 | Status: SHIPPED | OUTPATIENT
Start: 2025-02-18 | End: 2025-02-18 | Stop reason: SDUPTHER

## 2025-02-18 NOTE — TELEPHONE ENCOUNTER
No care due was identified.  [facility] Embedded Care Due Messages. Reference number: [id number].   2/17/2025 11:26:38 PM CST

## 2025-02-18 NOTE — TELEPHONE ENCOUNTER
Refill Decision Note   Flor Feliz  is requesting a refill authorization.  Brief Assessment and Rationale for Refill:  Approve     Medication Therapy Plan:         Comments:     Note composed:10:28 AM 02/18/2025

## 2025-02-19 ENCOUNTER — PATIENT MESSAGE (OUTPATIENT)
Dept: FAMILY MEDICINE | Facility: CLINIC | Age: 55
End: 2025-02-19
Payer: COMMERCIAL

## 2025-02-21 ENCOUNTER — RESULTS FOLLOW-UP (OUTPATIENT)
Dept: FAMILY MEDICINE | Facility: CLINIC | Age: 55
End: 2025-02-21

## 2025-02-24 ENCOUNTER — OFFICE VISIT (OUTPATIENT)
Dept: FAMILY MEDICINE | Facility: CLINIC | Age: 55
End: 2025-02-24
Payer: COMMERCIAL

## 2025-02-24 DIAGNOSIS — E78.2 MIXED HYPERLIPIDEMIA: ICD-10-CM

## 2025-02-24 DIAGNOSIS — Z00.00 WELLNESS EXAMINATION: ICD-10-CM

## 2025-02-24 DIAGNOSIS — I10 ESSENTIAL HYPERTENSION: ICD-10-CM

## 2025-02-24 DIAGNOSIS — E11.9 NEW ONSET TYPE 2 DIABETES MELLITUS: Primary | ICD-10-CM

## 2025-02-24 PROCEDURE — 98006 SYNCH AUDIO-VIDEO EST MOD 30: CPT | Mod: 95,,, | Performed by: INTERNAL MEDICINE

## 2025-02-24 PROCEDURE — 1159F MED LIST DOCD IN RCRD: CPT | Mod: CPTII,95,, | Performed by: INTERNAL MEDICINE

## 2025-02-24 PROCEDURE — G2211 COMPLEX E/M VISIT ADD ON: HCPCS | Mod: 95,,, | Performed by: INTERNAL MEDICINE

## 2025-02-24 PROCEDURE — 1160F RVW MEDS BY RX/DR IN RCRD: CPT | Mod: CPTII,95,, | Performed by: INTERNAL MEDICINE

## 2025-02-24 PROCEDURE — 3051F HG A1C>EQUAL 7.0%<8.0%: CPT | Mod: CPTII,95,, | Performed by: INTERNAL MEDICINE

## 2025-02-24 RX ORDER — LANCETS
EACH MISCELLANEOUS
Qty: 200 EACH | Refills: 12 | Status: SHIPPED | OUTPATIENT
Start: 2025-02-24

## 2025-02-24 RX ORDER — TIRZEPATIDE 2.5 MG/.5ML
2.5 INJECTION, SOLUTION SUBCUTANEOUS
Qty: 4 PEN | Refills: 0 | Status: SHIPPED | OUTPATIENT
Start: 2025-02-24

## 2025-02-24 RX ORDER — INSULIN PUMP SYRINGE, 3 ML
EACH MISCELLANEOUS
Qty: 1 EACH | Refills: 0 | Status: SHIPPED | OUTPATIENT
Start: 2025-02-24

## 2025-02-24 RX ORDER — METFORMIN HYDROCHLORIDE 500 MG/1
500 TABLET, EXTENDED RELEASE ORAL DAILY
Qty: 90 TABLET | Refills: 1 | Status: SHIPPED | OUTPATIENT
Start: 2025-02-24 | End: 2026-02-24

## 2025-02-24 NOTE — PROGRESS NOTES
The patient location is:  [place]  The chief complaint leading to consultation is: f/u labs     Visit type:  Audiovisual    Face to Face time with patient:  minutes of total time spent on the encounter, which includes face to face time and non-face to face time preparing to see the patient (eg, review of tests), Obtaining and/or reviewing separately obtained history, Documenting clinical information in the electronic or other health record, Independently interpreting results (not separately reported) and communicating results to the patient/family/caregiver, or Care coordination (not separately reported).       Each patient to whom he or she provides medical services by telemedicine is:  (1) informed of the relationship between the physician and patient and the respective role of any other health care provider with respect to management of the patient; and (2) notified that he or she may decline to receive medical services by telemedicine and may withdraw from such care at any time.    Subjective:       Patient ID: Flor Feliz is a 54 y.o. female.    History of Present Illness      CHIEF COMPLAINT:  Patient presents to discuss recent lab results showing elevated glucose levels and hemoglobin A1c, indicating diabetes.    HPI:  Patient's recent lab results show a significant increase in hemoglobin A1c from 5.4% last year to 7.1% currently, which is in the diabetes range. Patient's fasting glucose was 137 mg/dL. She reports increased frequency of urination, and recent fatigue, though she is unsure if the latter is related to her blood sugar. Patient started a low-carb diet 3 weeks ago, prior to getting her labs done, in an effort to improve her health.   She has been adhering to this diet and reports doing well with it so far. Patient denies blurry vision or changes in visual acuity.    MEDICATIONS:  Patient is on Qsymia for weight loss. She was previously on Ozempic but discontinued it due to nausea and  insurance coverage changes.        Chief Complaint: Diabetes    HPI    Gyn: Dr De La O / delmar ablation.  E/T q 3m HRT pellets, P 12 days .  // 3-4 M last menses   MM/24   Colonoscopy:  10/2019 Dr. Ratliff r/c 10 yrs  Immunizations: Flu:  Tdap:   Shingles: recommend   Smoker:  No      F/u   Labs   New on set DM      Type 2 diabetes:  uncontrolled A1c 7.1 G 137.  Start metformin ER once daily.  Attempt to get Mounjaro covered.    Mixed HLD:  LDL goal < 70 //  // Rx Crestor 5               Highest 164      HTN: Controlled:   Rx Lisinopril 20 bid, Metoprolol 25     Anxiety: controlled Rx Zoloft 50                Hx anxiety, irritability, work, easily upset.     Metabolic syndrome BMI 38/241 increase while off. Restarted recently   Off Rx Qsymia 11.25 (higher dose didn't help).    Off not covered Ozempic 1 // Zepbound        Assessment & Plan:  1. New onset type 2 diabetes mellitus  - tirzepatide (MOUNJARO) 2.5 mg/0.5 mL PnIj; Inject 2.5 mg into the skin every 7 days. 1st month  Dispense: 4 Pen; Refill: 0  - metFORMIN (GLUCOPHAGE-XR) 500 MG ER 24hr tablet; Take 1 tablet (500 mg total) by mouth once daily.  Dispense: 90 tablet; Refill: 1  - Hemoglobin A1C; Future  - Comprehensive Metabolic Panel; Future  - Microalbumin/Creatinine Ratio, Urine; Future  - Hemoglobin A1C  - Comprehensive Metabolic Panel  - Microalbumin/Creatinine Ratio, Urine  - blood-glucose meter kit; To check BG 2 times daily, to use with insurance preferred meter  Dispense: 1 each; Refill: 0  - lancets Misc; To check BG 2 times daily, to use with insurance preferred meter  Dispense: 200 each; Refill: 12  - blood sugar diagnostic Strp; To check BG 2 times daily, to use with insurance preferred meter  Dispense: 200 strip; Refill: 12    2. Mixed hyperlipidemia    3. Essential hypertension    4. Wellness examination  - Hemoglobin A1C; Future  - Comprehensive Metabolic Panel; Future  - Microalbumin/Creatinine Ratio, Urine; Future  - Hemoglobin A1C  -  Comprehensive Metabolic Panel  - Microalbumin/Creatinine Ratio, Urine     New onset type 2 diabetes mellitus  -     tirzepatide (MOUNJARO) 2.5 mg/0.5 mL PnIj; Inject 2.5 mg into the skin every 7 days. 1st month  Dispense: 4 Pen; Refill: 0  -     metFORMIN (GLUCOPHAGE-XR) 500 MG ER 24hr tablet; Take 1 tablet (500 mg total) by mouth once daily.  Dispense: 90 tablet; Refill: 1  -     Hemoglobin A1C; Future; Expected date: 02/24/2025  -     Comprehensive Metabolic Panel; Future  -     Microalbumin/Creatinine Ratio, Urine; Future; Expected date: 02/24/2025  -     blood-glucose meter kit; To check BG 2 times daily, to use with insurance preferred meter  Dispense: 1 each; Refill: 0  -     lancets Misc; To check BG 2 times daily, to use with insurance preferred meter  Dispense: 200 each; Refill: 12  -     blood sugar diagnostic Strp; To check BG 2 times daily, to use with insurance preferred meter  Dispense: 200 strip; Refill: 12    Mixed hyperlipidemia    Essential hypertension    Wellness examination  -     Hemoglobin A1C; Future; Expected date: 02/24/2025  -     Comprehensive Metabolic Panel; Future  -     Microalbumin/Creatinine Ratio, Urine; Future; Expected date: 02/24/2025          Recommend diet exercise:  High protein, low fat, low carb diet - calories women under <1200 & men <1800, carbs <100 G, protein 50-60 G daily. Protein supplements to replace one meal.  Keep all beverages < 10 calories per serving. Keep snacks < 100 calories.   Recommend exercise 160 minutes per week, combo of cardio and weight/strength training.     Visit today included increased complexity associated with the care of the episodic problem addressed and managing the longitudinal care of the patient due to the serious and/or complex managed problem(s).  Diabetes    Disclaimer: This note was partly generated using dictation /[employer]ien listening transcription software which may occasionally result in transcription errors      Review of  Systems:  Review of Systems   Constitutional:  Positive for unexpected weight change. Negative for activity change and appetite change.   HENT:  Negative for hearing loss, nosebleeds, rhinorrhea and trouble swallowing.    Eyes:  Negative for pain, discharge and visual disturbance.   Respiratory:  Negative for choking, chest tightness and wheezing.    Cardiovascular:  Negative for chest pain and palpitations.   Gastrointestinal:  Negative for blood in stool, constipation, diarrhea and vomiting.   Endocrine: Negative for polydipsia and polyuria.   Genitourinary:  Negative for difficulty urinating, dysuria, hematuria and menstrual problem.   Musculoskeletal:  Negative for arthralgias, joint swelling and neck pain.   Skin:  Negative for pallor.   Neurological:  Negative for facial asymmetry, weakness and headaches.   Hematological:  Does not bruise/bleed easily.   Psychiatric/Behavioral:  Negative for confusion and dysphoric mood.        Objective:     Wt Readings from Last 3 Encounters:   01/29/25 109.4 kg (241 lb 1.2 oz)   10/17/24 102.1 kg (225 lb)   04/25/24 108.4 kg (238 lb 15.7 oz)     Temp Readings from Last 3 Encounters:   10/17/24 97.3 °F (36.3 °C) (Oral)   02/29/24 97.8 °F (36.6 °C) (Oral)   11/01/23 97.5 °F (36.4 °C) (Temporal)     BP Readings from Last 3 Encounters:   01/29/25 116/80   10/17/24 127/75   04/25/24 122/83     Pulse Readings from Last 3 Encounters:   01/29/25 73   10/17/24 70   04/25/24 82      Lab Results   Component Value Date    WBC 9.4 02/14/2025    HGB 15.5 02/14/2025    HCT 46.7 (H) 02/14/2025     02/14/2025    CHOL 156 02/14/2025    TRIG 64 02/14/2025    HDL 49 02/14/2025    ALT 12 02/14/2025    AST 17 02/14/2025     02/14/2025    K 4.2 02/14/2025     (H) 02/14/2025    CREATININE 0.82 02/14/2025    BUN 22 02/14/2025    CO2 22 02/14/2025    TSH 0.902 02/14/2025    INR 1.0 06/16/2017    HGBA1C 7.1 (H) 02/14/2025                  Physical Exam      Medication List with  Changes/Refills   New Medications    BLOOD SUGAR DIAGNOSTIC STRP    To check BG 2 times daily, to use with insurance preferred meter    BLOOD-GLUCOSE METER KIT    To check BG 2 times daily, to use with insurance preferred meter    LANCETS MISC    To check BG 2 times daily, to use with insurance preferred meter    METFORMIN (GLUCOPHAGE-XR) 500 MG ER 24HR TABLET    Take 1 tablet (500 mg total) by mouth once daily.    TIRZEPATIDE (MOUNJARO) 2.5 MG/0.5 ML PNIJ    Inject 2.5 mg into the skin every 7 days. 1st month   Current Medications    CETIRIZINE 10 MG CHEWABLE TABLET    Take 10 mg by mouth once daily.    FLUTICASONE PROPIONATE (FLONASE) 50 MCG/ACTUATION NASAL SPRAY    1 spray (50 mcg total) by Each Nostril route 2 (two) times daily as needed for Rhinitis or Allergies.    HYDROCHLOROTHIAZIDE (MICROZIDE) 12.5 MG CAPSULE    TAKE 1 CAPSULE(12.5 MG) BY MOUTH EVERY DAY    LISINOPRIL (PRINIVIL,ZESTRIL) 20 MG TABLET    Take 1 tablet (20 mg total) by mouth 2 (two) times daily.    METOPROLOL SUCCINATE (TOPROL-XL) 25 MG 24 HR TABLET    Take 1 tablet (25 mg total) by mouth every evening.    NAPROXEN (NAPROSYN) 500 MG TABLET    Take 1 tablet (500 mg total) by mouth 2 (two) times daily as needed (pelvic pain).    ONDANSETRON (ZOFRAN-ODT) 4 MG TBDL    Take 1 tablet (4 mg total) by mouth every 8 (eight) hours as needed (nausea/vomiting).    PHENTERMINE-TOPIRAMATE (QSYMIA) 11.25-69 MG CM24    Take 1 capsule by mouth every morning.    PROGESTERONE (PROMETRIUM) 200 MG CAPSULE    Take 200 mg by mouth. 12 day monthly    ROSUVASTATIN (CRESTOR) 5 MG TABLET    Take 1 tablet (5 mg total) by mouth once daily.    SERTRALINE (ZOLOFT) 50 MG TABLET    Take 1 tablet by mouth once daily   Discontinued Medications    TIRZEPATIDE, WEIGHT LOSS, (ZEPBOUND) 2.5 MG/0.5 ML PNIJ    Inject 2.5 mg into the skin every 7 days.           Answers submitted by the patient for this visit:  Review of Systems Questionnaire (Submitted on 2/24/2025)  activity change:  No  unexpected weight change: Yes  neck pain: No  hearing loss: No  rhinorrhea: No  trouble swallowing: No  eye discharge: No  visual disturbance: No  chest tightness: No  wheezing: No  chest pain: No  palpitations: No  blood in stool: No  constipation: No  vomiting: No  diarrhea: No  polydipsia: No  polyuria: No  difficulty urinating: No  hematuria: No  menstrual problem: No  dysuria: No  joint swelling: No  arthralgias: No  headaches: No  weakness: No  confusion: No  dysphoric mood: No

## 2025-02-25 ENCOUNTER — PATIENT MESSAGE (OUTPATIENT)
Dept: FAMILY MEDICINE | Facility: CLINIC | Age: 55
End: 2025-02-25
Payer: COMMERCIAL

## 2025-02-26 NOTE — TELEPHONE ENCOUNTER
PA for  initiated via Prompt[employer]    EOC ID:  [id number]  Dx code: E11.9     Awaiting determination response:    The prior authorization department will contact you if additional information is needed. Once a decision is made you will be notified of the decision.

## 2025-03-26 ENCOUNTER — PATIENT MESSAGE (OUTPATIENT)
Dept: FAMILY MEDICINE | Facility: CLINIC | Age: 55
End: 2025-03-26
Payer: COMMERCIAL

## 2025-03-26 DIAGNOSIS — E11.9 NEW ONSET TYPE 2 DIABETES MELLITUS: ICD-10-CM

## 2025-03-26 DIAGNOSIS — I10 ESSENTIAL HYPERTENSION: ICD-10-CM

## 2025-03-26 DIAGNOSIS — E78.2 DM TYPE 2 WITH DIABETIC MIXED HYPERLIPIDEMIA: Primary | ICD-10-CM

## 2025-03-26 DIAGNOSIS — E11.69 DM TYPE 2 WITH DIABETIC MIXED HYPERLIPIDEMIA: Primary | ICD-10-CM

## 2025-03-26 RX ORDER — LISINOPRIL 20 MG/1
20 TABLET ORAL 2 TIMES DAILY
Qty: 180 TABLET | Refills: 3 | Status: SHIPPED | OUTPATIENT
Start: 2025-03-26

## 2025-03-26 NOTE — TELEPHONE ENCOUNTER
No care due was identified.  [facility] Embedded Care Due Messages. Reference number: [id number].   3/26/2025 3:32:42 PM CDT

## 2025-03-26 NOTE — TELEPHONE ENCOUNTER
Please approve for lisinopriL (PRINIVIL,ZESTRIL) 20 MG tablet     Last OV 02/24/25  Next appt 06/09/25

## 2025-03-27 NOTE — TELEPHONE ENCOUNTER
Pt requesting refill and increase on Mounjaro. Currently completing week 4 of 2.5mg. Please review and advise.

## 2025-03-27 NOTE — TELEPHONE ENCOUNTER
Refill Decision Note   Flor Feliz  is requesting a refill authorization.  Brief Assessment and Rationale for Refill:  Approve     Medication Therapy Plan:         Comments:     Note composed:7:18 PM 03/26/2025

## 2025-03-28 RX ORDER — TIRZEPATIDE 5 MG/.5ML
5 INJECTION, SOLUTION SUBCUTANEOUS
Qty: 4 PEN | Refills: 3 | Status: SHIPPED | OUTPATIENT
Start: 2025-03-28

## 2025-05-28 ENCOUNTER — PATIENT MESSAGE (OUTPATIENT)
Dept: FAMILY MEDICINE | Facility: CLINIC | Age: 55
End: 2025-05-28
Payer: COMMERCIAL

## 2025-06-09 ENCOUNTER — OFFICE VISIT (OUTPATIENT)
Dept: FAMILY MEDICINE | Facility: CLINIC | Age: 55
End: 2025-06-09
Payer: COMMERCIAL

## 2025-06-09 VITALS
BODY MASS INDEX: 34 KG/M2 | WEIGHT: 211.56 LBS | OXYGEN SATURATION: 96 % | RESPIRATION RATE: 18 BRPM | SYSTOLIC BLOOD PRESSURE: 116 MMHG | DIASTOLIC BLOOD PRESSURE: 78 MMHG | HEIGHT: 66 IN | HEART RATE: 73 BPM

## 2025-06-09 DIAGNOSIS — E11.69 DM TYPE 2 WITH DIABETIC MIXED HYPERLIPIDEMIA: Primary | ICD-10-CM

## 2025-06-09 DIAGNOSIS — E78.2 DM TYPE 2 WITH DIABETIC MIXED HYPERLIPIDEMIA: Primary | ICD-10-CM

## 2025-06-09 DIAGNOSIS — Z00.00 WELLNESS EXAMINATION: ICD-10-CM

## 2025-06-09 DIAGNOSIS — I10 ESSENTIAL HYPERTENSION: ICD-10-CM

## 2025-06-09 DIAGNOSIS — M85.859 OSTEOPENIA OF HIP, UNSPECIFIED LATERALITY: ICD-10-CM

## 2025-06-09 DIAGNOSIS — E66.811 CLASS 1 OBESITY WITH SERIOUS COMORBIDITY AND BODY MASS INDEX (BMI) OF 34.0 TO 34.9 IN ADULT, UNSPECIFIED OBESITY TYPE: ICD-10-CM

## 2025-06-09 DIAGNOSIS — R00.2 HEART PALPITATIONS: ICD-10-CM

## 2025-06-09 PROCEDURE — 3008F BODY MASS INDEX DOCD: CPT | Mod: CPTII,S$GLB,, | Performed by: INTERNAL MEDICINE

## 2025-06-09 PROCEDURE — 1160F RVW MEDS BY RX/DR IN RCRD: CPT | Mod: CPTII,S$GLB,, | Performed by: INTERNAL MEDICINE

## 2025-06-09 PROCEDURE — G2211 COMPLEX E/M VISIT ADD ON: HCPCS | Mod: S$GLB,,, | Performed by: INTERNAL MEDICINE

## 2025-06-09 PROCEDURE — 99214 OFFICE O/P EST MOD 30 MIN: CPT | Mod: S$GLB,,, | Performed by: INTERNAL MEDICINE

## 2025-06-09 PROCEDURE — 4010F ACE/ARB THERAPY RXD/TAKEN: CPT | Mod: CPTII,S$GLB,, | Performed by: INTERNAL MEDICINE

## 2025-06-09 PROCEDURE — 99999 PR PBB SHADOW E&M-EST. PATIENT-LVL IV: CPT | Mod: PBBFAC,,, | Performed by: INTERNAL MEDICINE

## 2025-06-09 PROCEDURE — 3078F DIAST BP <80 MM HG: CPT | Mod: CPTII,S$GLB,, | Performed by: INTERNAL MEDICINE

## 2025-06-09 PROCEDURE — 1159F MED LIST DOCD IN RCRD: CPT | Mod: CPTII,S$GLB,, | Performed by: INTERNAL MEDICINE

## 2025-06-09 PROCEDURE — 3074F SYST BP LT 130 MM HG: CPT | Mod: CPTII,S$GLB,, | Performed by: INTERNAL MEDICINE

## 2025-06-09 PROCEDURE — 3051F HG A1C>EQUAL 7.0%<8.0%: CPT | Mod: CPTII,S$GLB,, | Performed by: INTERNAL MEDICINE

## 2025-06-09 RX ORDER — ROSUVASTATIN CALCIUM 5 MG/1
5 TABLET, COATED ORAL DAILY
Qty: 90 TABLET | Refills: 2 | Status: SHIPPED | OUTPATIENT
Start: 2025-06-09

## 2025-06-09 RX ORDER — TIRZEPATIDE 7.5 MG/.5ML
7.5 INJECTION, SOLUTION SUBCUTANEOUS
Qty: 4 PEN | Refills: 5 | Status: SHIPPED | OUTPATIENT
Start: 2025-06-09

## 2025-06-09 RX ORDER — METFORMIN HYDROCHLORIDE 500 MG/1
500 TABLET, EXTENDED RELEASE ORAL DAILY
Qty: 90 TABLET | Refills: 2 | Status: SHIPPED | OUTPATIENT
Start: 2025-06-09 | End: 2026-06-09

## 2025-06-09 NOTE — PROGRESS NOTES
Subjective:       Patient ID: Flor Feliz is a 55 y.o. female.    Chief Complaint: Follow-up   HPI     History of Present Illness              Gyn: Dr De La O / delmar ablation.  E/T q 3m HRT pellets, P 12 days   MM/25   Dexa: Osteopenia 25' H -1.2   Colonoscopy:  10/2019 Dr. Ratliff r/c 10 yrs  Immunizations: Flu:  Tdap:   Prevnar 20: due Shingles: due   Smoker:  No      F/u   Labs done Thursday and [facility] and not resulted yet.  ? Name issue        Type 2 diabetes:  uncontrolled A1c 7.1 G 137.  Rx Metformin ER once daily, Mounjaro 5.  Has lost 30 lbs      Mixed HLD:  LDL goal < 70 //  Rx Crestor 5               Highest 164      HTN: Controlled:   Rx Lisinopril 20 bid, Metoprolol 25     Anxiety: controlled Rx Zoloft 50                Hx anxiety, irritability, work, easily upset.     Metabolic syndrome BMI 38/241, 34/211  Off Rx Qsymia            ____________________________________________________________________________________________________  Assessment & Plan:  1. DM type 2 with diabetic mixed hyperlipidemia  - Lipid Panel; Future  - Hemoglobin A1C; Future  - Comprehensive Metabolic Panel; Future  - Microalbumin/Creatinine Ratio, Urine; Future  - Lipid Panel  - Hemoglobin A1C  - Comprehensive Metabolic Panel  - Microalbumin/Creatinine Ratio, Urine  - tirzepatide (MOUNJARO) 7.5 mg/0.5 mL PnIj; Inject 7.5 mg into the skin every 7 days.  Dispense: 4 Pen; Refill: 5  - metFORMIN (GLUCOPHAGE-XR) 500 MG ER 24hr tablet; Take 1 tablet (500 mg total) by mouth once daily.  Dispense: 90 tablet; Refill: 2  - rosuvastatin (CRESTOR) 5 MG tablet; Take 1 tablet (5 mg total) by mouth once daily.  Dispense: 90 tablet; Refill: 2    2. Essential hypertension    3. Osteopenia of hip, unspecified laterality    4. Wellness examination  - CBC Without Differential; Future  - Lipid Panel; Future  - Hemoglobin A1C; Future  - Comprehensive Metabolic Panel; Future  - Microalbumin/Creatinine Ratio, Urine; Future  - CBC Without  Differential  - Lipid Panel  - Hemoglobin A1C  - Comprehensive Metabolic Panel  - Microalbumin/Creatinine Ratio, Urine    5. Heart palpitations    6. Class 1 obesity with serious comorbidity and body mass index (BMI) of 34.0 to 34.9 in adult, unspecified obesity type     DM type 2 with diabetic mixed hyperlipidemia  -     Lipid Panel; Future  -     Hemoglobin A1C; Future; Expected date: 06/09/2025  -     Comprehensive Metabolic Panel; Future  -     Microalbumin/Creatinine Ratio, Urine; Future; Expected date: 06/09/2025  -     tirzepatide (MOUNJARO) 7.5 mg/0.5 mL PnIj; Inject 7.5 mg into the skin every 7 days.  Dispense: 4 Pen; Refill: 5  -     metFORMIN (GLUCOPHAGE-XR) 500 MG ER 24hr tablet; Take 1 tablet (500 mg total) by mouth once daily.  Dispense: 90 tablet; Refill: 2  -     rosuvastatin (CRESTOR) 5 MG tablet; Take 1 tablet (5 mg total) by mouth once daily.  Dispense: 90 tablet; Refill: 2    Essential hypertension    Osteopenia of hip, unspecified laterality    Wellness examination  -     CBC Without Differential; Future; Expected date: 06/09/2025  -     Lipid Panel; Future  -     Hemoglobin A1C; Future; Expected date: 06/09/2025  -     Comprehensive Metabolic Panel; Future  -     Microalbumin/Creatinine Ratio, Urine; Future; Expected date: 06/09/2025    Heart palpitations    Class 1 obesity with serious comorbidity and body mass index (BMI) of 34.0 to 34.9 in adult, unspecified obesity type        Continue to work on maintain healthy weight, balanced diet. Avoid unhealthy habits: smoking/vaping, excessive alcohol intake.     Recommend diet exercise:  High protein, low fat, low carb diet - calories women under <1200 & men <1800, carbs <100 G, protein 50-60 G daily. Protein supplements to replace one meal.  Keep all beverages < 10 calories per serving. Keep snacks < 100 calories.   Recommend exercise 160 minutes per week, combo of cardio and weight/strength training.     Visit today included increased complexity  "associated with the care of the episodic problem addressed and managing the longitudinal care of the patient due to the serious and/or complex managed problem(s). HTN      Disclaimer: This note was partly generated using dictation software which may occasionally result in transcription errors  ____________________________________________________________________________________________________  Review of Systems:  Review of Systems      Negative     Objective:     Wt Readings from Last 3 Encounters:   06/09/25 95.9 kg (211 lb 8.5 oz)   06/03/25 95.3 kg (210 lb)   01/29/25 109.4 kg (241 lb 1.2 oz)     BP Readings from Last 3 Encounters:   06/09/25 116/78   01/29/25 116/80   10/17/24 127/75       Lab Results   Component Value Date    WBC 9.4 02/14/2025    HGB 15.5 02/14/2025    HCT 46.7 (H) 02/14/2025     02/14/2025     02/14/2025    K 4.2 02/14/2025     (H) 02/14/2025    ALT 12 02/14/2025    AST 17 02/14/2025    CO2 22 02/14/2025    CREATININE 0.82 02/14/2025    BUN 22 02/14/2025     (H) 02/14/2025      Hemoglobin A1c   Date Value Ref Range Status   02/14/2025 7.1 (H) 4.8 - 5.6 % Final     Comment:              Prediabetes: 5.7 - 6.4           Diabetes: >6.4           Glycemic control for adults with diabetes: <7.0     03/10/2023 5.4 4.8 - 5.6 % Final     Comment:              Prediabetes: 5.7 - 6.4           Diabetes: >6.4           Glycemic control for adults with diabetes: <7.0     06/22/2021 5.5 4.8 - 5.6 % Final     Comment:              Prediabetes: 5.7 - 6.4           Diabetes: >6.4           Glycemic control for adults with diabetes: <7.0        Lab Results   Component Value Date    TSH 0.902 02/14/2025    TSH 0.897 03/10/2023    TSH 1.480 07/18/2022     No results found for: "FREET4"  Lab Results   Component Value Date    LDLCALC 94 02/14/2025    LDLCALC 91 10/06/2023    LDLCALC 164 (H) 03/10/2023     Lab Results   Component Value Date    TRIG 64 02/14/2025    TRIG 86 10/06/2023    " TRIG 73 03/10/2023            Physical Exam      Constitutional:       Appearance: Normal appearance.   HENT:      Head: Normocephalic and atraumatic.   Eyes:      Extraocular Movements: Extraocular movements intact.      Pupils: Pupils are equal, round, and reactive to light.   Cardiovascular:      Rate and Rhythm: Normal rate.   Pulmonary:      Effort: Pulmonary effort is normal.   Neurological:      Mental Status: She is alert and oriented to person, place, and time.   Psychiatric:         Mood and Affect: Mood normal.     Medication List with Changes/Refills   New Medications    TIRZEPATIDE (MOUNJARO) 7.5 MG/0.5 ML PNIJ    Inject 7.5 mg into the skin every 7 days.   Current Medications    BLOOD SUGAR DIAGNOSTIC STRP    To check BG 2 times daily, to use with insurance preferred meter    FLUTICASONE PROPIONATE (FLONASE) 50 MCG/ACTUATION NASAL SPRAY    1 spray (50 mcg total) by Each Nostril route 2 (two) times daily as needed for Rhinitis or Allergies.    HYDROCHLOROTHIAZIDE (MICROZIDE) 12.5 MG CAPSULE    TAKE 1 CAPSULE(12.5 MG) BY MOUTH EVERY DAY    LANCETS MISC    To check BG 2 times daily, to use with insurance preferred meter    LISINOPRIL (PRINIVIL,ZESTRIL) 20 MG TABLET    Take 1 tablet (20 mg total) by mouth 2 (two) times daily.    METOPROLOL SUCCINATE (TOPROL-XL) 25 MG 24 HR TABLET    Take 1 tablet (25 mg total) by mouth every evening.    PROGESTERONE (PROMETRIUM) 200 MG CAPSULE    Take 200 mg by mouth. 12 day monthly    SERTRALINE (ZOLOFT) 50 MG TABLET    Take 1 tablet by mouth once daily   Changed and/or Refilled Medications    Modified Medication Previous Medication    METFORMIN (GLUCOPHAGE-XR) 500 MG ER 24HR TABLET metFORMIN (GLUCOPHAGE-XR) 500 MG ER 24hr tablet       Take 1 tablet (500 mg total) by mouth once daily.    Take 1 tablet (500 mg total) by mouth once daily.    ROSUVASTATIN (CRESTOR) 5 MG TABLET rosuvastatin (CRESTOR) 5 MG tablet       Take 1 tablet (5 mg total) by mouth once daily.    Take 1  tablet (5 mg total) by mouth once daily.   Discontinued Medications    BLOOD-GLUCOSE METER KIT    To check BG 2 times daily, to use with insurance preferred meter    CETIRIZINE 10 MG CHEWABLE TABLET    Take 10 mg by mouth once daily.    NAPROXEN (NAPROSYN) 500 MG TABLET    Take 1 tablet (500 mg total) by mouth 2 (two) times daily as needed (pelvic pain).    ONDANSETRON (ZOFRAN-ODT) 4 MG TBDL    Take 1 tablet (4 mg total) by mouth every 8 (eight) hours as needed (nausea/vomiting).    PHENTERMINE-TOPIRAMATE (QSYMIA) 11.25-69 MG CM24    Take 1 capsule by mouth every morning.    TIRZEPATIDE (MOUNJARO) 5 MG/0.5 ML PNIJ    Inject 5 mg into the skin every 7 days.

## 2025-06-10 ENCOUNTER — RESULTS FOLLOW-UP (OUTPATIENT)
Dept: FAMILY MEDICINE | Facility: CLINIC | Age: 55
End: 2025-06-10
Payer: COMMERCIAL

## 2025-07-14 ENCOUNTER — PATIENT MESSAGE (OUTPATIENT)
Dept: ADMINISTRATIVE | Facility: HOSPITAL | Age: 55
End: 2025-07-14
Payer: COMMERCIAL

## 2025-07-15 DIAGNOSIS — F41.1 GAD (GENERALIZED ANXIETY DISORDER): ICD-10-CM

## 2025-07-15 RX ORDER — SERTRALINE HYDROCHLORIDE 50 MG/1
TABLET, FILM COATED ORAL
Qty: 90 TABLET | Refills: 3 | Status: SHIPPED | OUTPATIENT
Start: 2025-07-15

## 2025-07-15 NOTE — TELEPHONE ENCOUNTER
No care due was identified.  [facility] Embedded Care Due Messages. Reference number: [id number].   7/15/2025 2:38:33 PM CDT

## 2025-07-16 NOTE — TELEPHONE ENCOUNTER
Refill Decision Note   Flor Feliz  is requesting a refill authorization.  Brief Assessment and Rationale for Refill:  Approve     Medication Therapy Plan:         Comments:     Note composed:11:41 PM 07/15/2025

## 2025-08-21 DIAGNOSIS — E11.69 DM TYPE 2 WITH DIABETIC MIXED HYPERLIPIDEMIA: ICD-10-CM

## 2025-08-21 DIAGNOSIS — E78.2 DM TYPE 2 WITH DIABETIC MIXED HYPERLIPIDEMIA: ICD-10-CM

## 2025-08-21 RX ORDER — METFORMIN HYDROCHLORIDE 500 MG/1
500 TABLET, EXTENDED RELEASE ORAL DAILY
Qty: 90 TABLET | Refills: 1 | Status: SHIPPED | OUTPATIENT
Start: 2025-08-21